# Patient Record
Sex: FEMALE | Race: WHITE | NOT HISPANIC OR LATINO | Employment: FULL TIME | URBAN - METROPOLITAN AREA
[De-identification: names, ages, dates, MRNs, and addresses within clinical notes are randomized per-mention and may not be internally consistent; named-entity substitution may affect disease eponyms.]

---

## 2018-06-26 ENCOUNTER — APPOINTMENT (OUTPATIENT)
Dept: OBGYN | Facility: CLINIC | Age: 28
End: 2018-06-26
Payer: COMMERCIAL

## 2018-06-26 ENCOUNTER — PRENATAL OFFICE VISIT (OUTPATIENT)
Dept: FAMILY MEDICINE | Facility: CLINIC | Age: 28
End: 2018-06-26
Payer: COMMERCIAL

## 2018-06-26 DIAGNOSIS — Z34.80 PRENATAL CARE, SUBSEQUENT PREGNANCY: Primary | ICD-10-CM

## 2018-06-26 PROCEDURE — 99207 ZZC NO CHARGE NURSE ONLY: CPT | Performed by: FAMILY MEDICINE

## 2018-06-26 RX ORDER — PYRIDOXINE HCL (VITAMIN B6) 25 MG
25 TABLET ORAL DAILY
COMMUNITY
End: 2018-09-05

## 2018-06-26 RX ORDER — ESCITALOPRAM OXALATE 10 MG/1
10 TABLET ORAL DAILY
COMMUNITY
End: 2018-10-03

## 2018-06-26 RX ORDER — PRENATAL VIT/IRON FUM/FOLIC AC 27MG-0.8MG
1 TABLET ORAL DAILY
COMMUNITY
Start: 2020-06-07 | End: 2022-05-10

## 2018-06-26 NOTE — MR AVS SNAPSHOT
"              After Visit Summary   6/26/2018    Lanie Martin    MRN: [id number]           Patient Information     Date Of Birth          1990        Visit Information        Provider Department      6/26/2018 5:36 PM Kd Wang MD [facility]        Today's Diagnoses     Prenatal care, subsequent pregnancy    -  1       Follow-ups after your visit        Your next 10 appointments already scheduled     Jul 03, 2018  1:00 PM CDT   New Prenatal with Kaycee Branham MD, [facility] 1   [facility] ([facility]    [address] [address] [phone]              Who to contact     If you have questions or need follow up information about today's clinic visit or your schedule please contact [facility] directly at [phone].  Normal or non-critical lab and imaging results will be communicated to you by MyChart, letter or phone within 4 business days after the clinic has received the results. If you do not hear from us within 7 days, please contact the clinic through MyChart or phone. If you have a critical or abnormal lab result, we will notify you by phone as soon as possible.  Submit refill requests through Recensus or call your pharmacy and they will forward the refill request to us. Please allow 3 business days for your refill to be completed.          Additional Information About Your Visit        MyChart Information     Recensus lets you send messages to your doctor, view your test results, renew your prescriptions, schedule appointments and more. To sign up, go to [email or web address] . Click on \"Log in\" on the left side of the screen, which will take you to the Welcome page. Then click on \"Sign up Now\" on the right side of the page.     You will be asked to enter the access code listed below, as well as some personal information. Please follow the directions to create your username and password.   "   Your access code is: [email or web address]  Expires: 2018  5:58 PM     Your access code will  in 90 days. If you need help or a new code, please call your [place] clinic or [phone].        Care EveryWhere ID     This is your Care EveryWhere ID. This could be used by other organizations to access your [place] medical records  [id number]        Your Vitals Were     Last Period                   2018            Blood Pressure from Last 3 Encounters:   11 108/67    Weight from Last 3 Encounters:   11 54.9 kg (121 lb)              Today, you had the following     No orders found for display       Primary Care Provider Fax #    Physician No Ref-Primary [phone]       No address on file        Equal Access to Services     KRISTOPHER CLAUDIO : Rocio dumonto Sogerda, waaxda luqadaha, qaybta kaalmada adeegyada, alex hunt . So [facility] [phone].    ATENCIÓN: Si habla español, tiene a finley disposición servicios gratuitos de asistencia lingüística. Llame al [phone].    We comply with applicable federal civil rights laws and [place] laws. We do not discriminate on the basis of race, color, national origin, age, disability, sex, sexual orientation, or gender identity.            Thank you!     Thank you for choosing [facility]  for your care. Our goal is always to provide you with excellent care. Hearing back from our patients is one way we can continue to improve our services. Please take a few minutes to complete the written survey that you may receive in the mail after your visit with us. Thank you!             Your Updated Medication List - Protect others around you: Learn how to safely use, store and throw away your medicines at www.disposemymeds.org.          This list is accurate as of 18  5:58 PM.  Always use your most recent med list.                   Brand Name Dispense Instructions for use Diagnosis    escitalopram 10 MG tablet     LEXAPRO     Take 10 mg by mouth daily        prenatal multivitamin plus iron 27-0.8 MG Tabs per tablet      Take 1 tablet by mouth daily        pyridOXINE 25 MG tablet    vitamin B-6     Take 25 mg by mouth daily        UNISOM 25 MG Tabs tablet   Generic drug:  doxylamine      Take 25 mg by mouth At Bedtime

## 2018-07-03 ENCOUNTER — PRENATAL OFFICE VISIT (OUTPATIENT)
Dept: OBGYN | Facility: CLINIC | Age: 28
End: 2018-07-03
Payer: COMMERCIAL

## 2018-07-03 VITALS
WEIGHT: 141.2 LBS | BODY MASS INDEX: 25.98 KG/M2 | HEIGHT: 62 IN | RESPIRATION RATE: 16 BRPM | SYSTOLIC BLOOD PRESSURE: 98 MMHG | TEMPERATURE: 98.4 F | DIASTOLIC BLOOD PRESSURE: 67 MMHG | HEART RATE: 78 BPM

## 2018-07-03 DIAGNOSIS — Z34.81 PRENATAL CARE, SUBSEQUENT PREGNANCY IN FIRST TRIMESTER: Primary | ICD-10-CM

## 2018-07-03 LAB
ABO + RH BLD: NORMAL
ABO + RH BLD: NORMAL
ALBUMIN UR-MCNC: NEGATIVE MG/DL
APPEARANCE UR: CLEAR
BILIRUB UR QL STRIP: NEGATIVE
BLD GP AB SCN SERPL QL: NORMAL
BLOOD BANK CMNT PATIENT-IMP: NORMAL
COLOR UR AUTO: YELLOW
ERYTHROCYTE [DISTWIDTH] IN BLOOD BY AUTOMATED COUNT: 11.9 % (ref 10–15)
GLUCOSE UR STRIP-MCNC: NEGATIVE MG/DL
HCT VFR BLD AUTO: 37.1 % (ref 35–47)
HGB BLD-MCNC: 12.7 G/DL (ref 11.7–15.7)
HGB UR QL STRIP: NEGATIVE
KETONES UR STRIP-MCNC: NEGATIVE MG/DL
LEUKOCYTE ESTERASE UR QL STRIP: NEGATIVE
MCH RBC QN AUTO: 31.8 PG (ref 26.5–33)
MCHC RBC AUTO-ENTMCNC: 34.2 G/DL (ref 31.5–36.5)
MCV RBC AUTO: 93 FL (ref 78–100)
NITRATE UR QL: NEGATIVE
PH UR STRIP: 6 PH (ref 5–7)
PLATELET # BLD AUTO: 273 10E9/L (ref 150–450)
RBC # BLD AUTO: 3.99 10E12/L (ref 3.8–5.2)
SOURCE: NORMAL
SP GR UR STRIP: 1.02 (ref 1–1.03)
SPECIMEN EXP DATE BLD: NORMAL
UROBILINOGEN UR STRIP-ACNC: 0.2 EU/DL (ref 0.2–1)
WBC # BLD AUTO: 9.4 10E9/L (ref 4–11)

## 2018-07-03 PROCEDURE — 36415 COLL VENOUS BLD VENIPUNCTURE: CPT | Performed by: OBSTETRICS & GYNECOLOGY

## 2018-07-03 PROCEDURE — 86850 RBC ANTIBODY SCREEN: CPT | Performed by: OBSTETRICS & GYNECOLOGY

## 2018-07-03 PROCEDURE — 86780 TREPONEMA PALLIDUM: CPT | Performed by: OBSTETRICS & GYNECOLOGY

## 2018-07-03 PROCEDURE — 85027 COMPLETE CBC AUTOMATED: CPT | Performed by: OBSTETRICS & GYNECOLOGY

## 2018-07-03 PROCEDURE — G0145 SCR C/V CYTO,THINLAYER,RESCR: HCPCS | Performed by: OBSTETRICS & GYNECOLOGY

## 2018-07-03 PROCEDURE — 87591 N.GONORRHOEAE DNA AMP PROB: CPT | Performed by: OBSTETRICS & GYNECOLOGY

## 2018-07-03 PROCEDURE — 87491 CHLMYD TRACH DNA AMP PROBE: CPT | Performed by: OBSTETRICS & GYNECOLOGY

## 2018-07-03 PROCEDURE — 99207 ZZC FIRST OB VISIT: CPT | Performed by: OBSTETRICS & GYNECOLOGY

## 2018-07-03 PROCEDURE — 87086 URINE CULTURE/COLONY COUNT: CPT | Performed by: OBSTETRICS & GYNECOLOGY

## 2018-07-03 PROCEDURE — 76817 TRANSVAGINAL US OBSTETRIC: CPT | Performed by: OBSTETRICS & GYNECOLOGY

## 2018-07-03 PROCEDURE — 87624 HPV HI-RISK TYP POOLED RSLT: CPT | Performed by: OBSTETRICS & GYNECOLOGY

## 2018-07-03 PROCEDURE — 81003 URINALYSIS AUTO W/O SCOPE: CPT | Performed by: OBSTETRICS & GYNECOLOGY

## 2018-07-03 PROCEDURE — 86900 BLOOD TYPING SEROLOGIC ABO: CPT | Performed by: OBSTETRICS & GYNECOLOGY

## 2018-07-03 PROCEDURE — 87389 HIV-1 AG W/HIV-1&-2 AB AG IA: CPT | Performed by: OBSTETRICS & GYNECOLOGY

## 2018-07-03 PROCEDURE — 86762 RUBELLA ANTIBODY: CPT | Performed by: OBSTETRICS & GYNECOLOGY

## 2018-07-03 PROCEDURE — 87340 HEPATITIS B SURFACE AG IA: CPT | Performed by: OBSTETRICS & GYNECOLOGY

## 2018-07-03 PROCEDURE — 86901 BLOOD TYPING SEROLOGIC RH(D): CPT | Performed by: OBSTETRICS & GYNECOLOGY

## 2018-07-03 NOTE — NURSING NOTE
"Initial BP 98/67 (BP Location: Right arm, Patient Position: Sitting, Cuff Size: Adult Regular)  Pulse 78  Temp 98.4  F (36.9  C) (Tympanic)  Resp 16  Ht 5' 2\" (1.575 m)  Wt 141 lb 3.2 oz (64 kg)  LMP 05/04/2018  Breastfeeding? No  BMI 25.83 kg/m2 Estimated body mass index is 25.83 kg/(m^2) as calculated from the following:    Height as of this encounter: 5' 2\" (1.575 m).    Weight as of this encounter: 141 lb 3.2 oz (64 kg). .    Samantha Moran, [facility]    "

## 2018-07-03 NOTE — MR AVS SNAPSHOT
"              After Visit Summary   7/3/2018    Lanie Martin    MRN: [id number]           Patient Information     Date Of Birth          1990        Visit Information        Provider Department      7/3/2018 1:00 PM Kaycee Branham MD; [facility] 1 [facility]        Today's Diagnoses     Prenatal care, subsequent pregnancy in first trimester    -  1       Follow-ups after your visit        Follow-up notes from your care team     Return in about 4 weeks (around 7/31/2018).      Your next 10 appointments already scheduled     Aug 03, 2018  4:15 PM CDT   ESTABLISHED PRENATAL with Rand Calle MD   [facility] ([facility]    [address] [address] [phone]              Who to contact     If you have questions or need follow up information about today's clinic visit or your schedule please contact [facility] directly at [phone].  Normal or non-critical lab and imaging results will be communicated to you by LineRate Systemshart, letter or phone within 4 business days after the clinic has received the results. If you do not hear from us within 7 days, please contact the clinic through CDNetworkst or phone. If you have a critical or abnormal lab result, we will notify you by phone as soon as possible.  Submit refill requests through "[employer] or call your pharmacy and they will forward the refill request to us. Please allow 3 business days for your refill to be completed.          Additional Information About Your Visit        LineRate Systemshart Information     "[employer] lets you send messages to your doctor, view your test results, renew your prescriptions, schedule appointments and more. To sign up, go to [email or web address] . Click on \"Log in\" on the left side of the screen, which will take you to the Welcome page. Then click on \"Sign up Now\" on the right side of the page.     You will be asked to enter the access code listed " "below, as well as some personal information. Please follow the directions to create your username and password.     Your access code is: [email or web address]  Expires: 2018  5:58 PM     Your access code will  in 90 days. If you need help or a new code, please call your [place] clinic or [phone].        Care EveryWhere ID     This is your Care EveryWhere ID. This could be used by other organizations to access your [place] medical records  [id number]        Your Vitals Were     Pulse Temperature Respirations Height Last Period Breastfeeding?    78 98.4  F (36.9  C) (Tympanic) 16 5' 2\" (1.575 m) 2018 No    BMI (Body Mass Index)                   25.83 kg/m2            Blood Pressure from Last 3 Encounters:   18 98/67   11 108/67    Weight from Last 3 Encounters:   18 141 lb 3.2 oz (64 kg)   11 121 lb (54.9 kg)              We Performed the Following     *UA reflex to Microscopic     ABO/Rh type and screen     CBC with platelets     Chlamydia trachomatis PCR     Hepatitis B surface antigen     HIV Antigen Antibody Combo     HPV High Risk Types DNA Cervical     Neisseria gonorrhoeae PCR     Pap imaged thin layer screen with HPV - recommended age 30 - 65 years (select HPV order below)     Rubella Antibody IgG Quantitative     Treponema Abs w Reflex to RPR and Titer     Urine Culture Aerobic Bacterial     US OB <14 Weeks w Transvaginal Single        Primary Care Provider Fax #    Physician No Ref-Primary [phone]       No address on file        Equal Access to Services     KRISTOPHER CLAUDIO : Hadii aad ku hadasho Soomaali, waaxda luqadaha, qaybta kaalmada adeegyada, alex hunt . So [facility] [phone].    ATENCIÓN: Si habla español, tiene a finley disposición servicios gratuitos de asistencia lingüística. Llame al [phone].    We comply with applicable federal civil rights laws and [place] laws. We do not discriminate on the basis of race, color, " national origin, age, disability, sex, sexual orientation, or gender identity.            Thank you!     Thank you for choosing [facility]  for your care. Our goal is always to provide you with excellent care. Hearing back from our patients is one way we can continue to improve our services. Please take a few minutes to complete the written survey that you may receive in the mail after your visit with us. Thank you!             Your Updated Medication List - Protect others around you: Learn how to safely use, store and throw away your medicines at www.disposemymeds.org.          This list is accurate as of 7/3/18  4:37 PM.  Always use your most recent med list.                   Brand Name Dispense Instructions for use Diagnosis    escitalopram 10 MG tablet    LEXAPRO     Take 10 mg by mouth daily        prenatal multivitamin plus iron 27-0.8 MG Tabs per tablet      Take 1 tablet by mouth daily        pyridOXINE 25 MG tablet    vitamin B-6     Take 25 mg by mouth daily        UNISOM 25 MG Tabs tablet   Generic drug:  doxylamine      Take 25 mg by mouth At Bedtime

## 2018-07-03 NOTE — PROGRESS NOTES
"Lanie is a 28 year old  at 8w4d by LMP here for new ob visit.     Very excited.  Had a son 12 years ago, who was adopted by her aunt and uncle, same FOB except at that time they had only been dating 3 months when they accidentally got pregnant.  This time it was a planned pregnancy.   Nausea   Depression on lexapro - well controlled       Obstetric History       T1      L1     SAB0   TAB0   Ectopic0   Multiple0   Live Births1       # Outcome Date GA Lbr Antoine/2nd Weight Sex Delivery Anes PTL Lv   2 Current            1 Term 10/28/06 40w0d  7 lb (3.175 kg) M    RADHA          ROS: Ten point review of systems was reviewed and negative except the above.    Past Medical History:   Diagnosis Date     Chickenpox      Depression      Past Surgical History:   Procedure Laterality Date     AS CREATE EARDRUM OPENING,GEN ANESTH       MOUTH SURGERY      wisdom teeth     Patient Active Problem List    Diagnosis Date Noted     Prenatal care, subsequent pregnancy 2018     Priority: Medium        Allergies   Allergen Reactions     Tylenol W/Codeine [Acetaminophen-Codeine]        Current Outpatient Prescriptions on File Prior to Visit:  doxylamine (UNISOM) 25 MG TABS tablet Take 25 mg by mouth At Bedtime   escitalopram (LEXAPRO) 10 MG tablet Take 10 mg by mouth daily   Prenatal Vit-Fe Fumarate-FA (PRENATAL MULTIVITAMIN PLUS IRON) 27-0.8 MG TABS per tablet Take 1 tablet by mouth daily   pyridOXINE (VITAMIN  B-6) 25 MG tablet Take 25 mg by mouth daily     No current facility-administered medications on file prior to visit.     Physical Exam:   BP 98/67 (BP Location: Right arm, Patient Position: Sitting, Cuff Size: Adult Regular)  Pulse 78  Temp 98.4  F (36.9  C) (Tympanic)  Resp 16  Ht 5' 2\" (1.575 m)  Wt 141 lb 3.2 oz (64 kg)  LMP 2018  Breastfeeding? No  BMI 25.83 kg/m2    Gen:  no acute distress, comfortable, smiling  HENT: No scleral injection or icterus  CV: Regular rate and rhythm, no " m/g/r  Resp: Normal work of breathing, no cough  GI: Abdomen soft, non-tender. No masses, organomegaly  Skin: No suspicious lesions or rashes  Psychiatric: mentation appears normal and affect bright    Cervix: Parous, closed, mobile, no discharge  Uterus: 9 weeks, Normal shape, position and consistency   Adnexa: Normal  Bony Pelvis: Adequate     Dating ultrasound 7/3/2018:  Impression:  Green intrauterine pregnancy at 9w0d with THERESA 19  Consistent with LMP dating at 8w4d with THERESA 2019   CRL 2.36cm   bpm    A/P 28 year old  at 8w4d dated by LMP c/w 8w4d US here for NOB visit.  - Discussed physician coverage, tertiary support, diet, exercise, weight gain, schedule of visits, routine and indicated ultrasounds, and childbirth education.   - Options for  testing for chromosomal and birth defects were discussed with the patient including nuchal lucency/blood marker testing in the first trimester and quad screening and/or Level 2 ultrasound in the second trimester. We discussed that these are screening tests and not diagnostic tests and that false positives and negatives are a distinct possibility. We discussed that follow up diagnostic testing would include chorionic villus sampling or amniocentesis depending on gestational age.  - NOB labs ordered  - recommend PNV    RTC 4 weeks    Kaycee Branham MD, MPH  [facility]

## 2018-07-03 NOTE — LETTER
July 11, 2018    Lanie Martin  [address] [address]    Dear Lanie,  We are happy to inform you that your PAP smear result from 7/3/18 is normal.  We are now able to do a follow up test on PAP smears. The DNA test is for HPV (Human Papilloma Virus). Cervical cancer is closely linked with certain types of HPV. Your results showed no evidence of high risk HPV.  Therefore we recommend you return in 3 years for your next pap smear.  You will still need to return to the clinic every year for an annual exam and other preventive tests.  Please contact the clinic at [phone] with any questions.  Sincerely,    Kaycee Branham MD/eve

## 2018-07-04 LAB
BACTERIA SPEC CULT: NO GROWTH
Lab: NORMAL
SPECIMEN SOURCE: NORMAL

## 2018-07-05 LAB
C TRACH DNA SPEC QL NAA+PROBE: NEGATIVE
HBV SURFACE AG SERPL QL IA: NONREACTIVE
HIV 1+2 AB+HIV1 P24 AG SERPL QL IA: NONREACTIVE
N GONORRHOEA DNA SPEC QL NAA+PROBE: NEGATIVE
RUBV IGG SERPL IA-ACNC: 17 IU/ML
SPECIMEN SOURCE: NORMAL
SPECIMEN SOURCE: NORMAL
T PALLIDUM AB SER QL: NONREACTIVE

## 2018-07-09 LAB
COPATH REPORT: NORMAL
PAP: NORMAL

## 2018-07-10 LAB
FINAL DIAGNOSIS: NORMAL
HPV HR 12 DNA CVX QL NAA+PROBE: NEGATIVE
HPV16 DNA SPEC QL NAA+PROBE: NEGATIVE
HPV18 DNA SPEC QL NAA+PROBE: NEGATIVE
SPECIMEN DESCRIPTION: NORMAL
SPECIMEN SOURCE CVX/VAG CYTO: NORMAL

## 2018-08-03 ENCOUNTER — PRENATAL OFFICE VISIT (OUTPATIENT)
Dept: OBGYN | Facility: CLINIC | Age: 28
End: 2018-08-03
Payer: COMMERCIAL

## 2018-08-03 VITALS
TEMPERATURE: 98.5 F | HEART RATE: 77 BPM | WEIGHT: 138.6 LBS | BODY MASS INDEX: 25.51 KG/M2 | DIASTOLIC BLOOD PRESSURE: 73 MMHG | HEIGHT: 62 IN | RESPIRATION RATE: 16 BRPM | SYSTOLIC BLOOD PRESSURE: 110 MMHG

## 2018-08-03 DIAGNOSIS — Z34.81 PRENATAL CARE, SUBSEQUENT PREGNANCY IN FIRST TRIMESTER: Primary | ICD-10-CM

## 2018-08-03 PROCEDURE — 99207 ZZC PRENATAL VISIT: CPT | Performed by: OBSTETRICS & GYNECOLOGY

## 2018-08-03 NOTE — NURSING NOTE
"Initial /73 (BP Location: Right arm, Patient Position: Sitting, Cuff Size: Adult Regular)  Pulse 77  Temp 98.5  F (36.9  C) (Tympanic)  Resp 16  Ht 5' 2\" (1.575 m)  Wt 138 lb 9.6 oz (62.9 kg)  LMP 05/04/2018  Breastfeeding? No  BMI 25.35 kg/m2 Estimated body mass index is 25.35 kg/(m^2) as calculated from the following:    Height as of this encounter: 5' 2\" (1.575 m).    Weight as of this encounter: 138 lb 9.6 oz (62.9 kg). .    Samantha Moran, [facility]    "

## 2018-08-03 NOTE — MR AVS SNAPSHOT
"              After Visit Summary   8/3/2018    Lanie Martin    MRN: [id number]           Patient Information     Date Of Birth          1990        Visit Information        Provider Department      8/3/2018 4:15 PM Rand Calle MD [facility]        Today's Diagnoses     Prenatal care, subsequent pregnancy in first trimester    -  1       Follow-ups after your visit        Who to contact     If you have questions or need follow up information about today's clinic visit or your schedule please contact [facility] directly at [phone].  Normal or non-critical lab and imaging results will be communicated to you by CeQurhart, letter or phone within 4 business days after the clinic has received the results. If you do not hear from us within 7 days, please contact the clinic through CeQurhart or phone. If you have a critical or abnormal lab result, we will notify you by phone as soon as possible.  Submit refill requests through Tamecco or call your pharmacy and they will forward the refill request to us. Please allow 3 business days for your refill to be completed.          Additional Information About Your Visit        MyChart Information     Tamecco lets you send messages to your doctor, view your test results, renew your prescriptions, schedule appointments and more. To sign up, go to [email or web address] . Click on \"Log in\" on the left side of the screen, which will take you to the Welcome page. Then click on \"Sign up Now\" on the right side of the page.     You will be asked to enter the access code listed below, as well as some personal information. Please follow the directions to create your username and password.     Your access code is: [email or web address]  Expires: 2018  5:58 PM     Your access code will  in 90 days. If you need help or a new code, please call your [facility] or [phone].        Care EveryWhere ID     This is your Care EveryWhere " "ID. This could be used by other organizations to access your [facility] medical records  [id number]        Your Vitals Were     Pulse Temperature Respirations Height Last Period Breastfeeding?    77 98.5  F (36.9  C) (Tympanic) 16 5' 2\" (1.575 m) 05/04/2018 No    BMI (Body Mass Index)                   25.35 kg/m2            Blood Pressure from Last 3 Encounters:   08/03/18 110/73   07/03/18 98/67   05/12/11 108/67    Weight from Last 3 Encounters:   08/03/18 138 lb 9.6 oz (62.9 kg)   07/03/18 141 lb 3.2 oz (64 kg)   05/12/11 121 lb (54.9 kg)              Today, you had the following     No orders found for display       Primary Care Provider Fax #    Physician No Ref-Primary [phone]       No address on file        Equal Access to Services     ED CLAUDIO : Hadkhushboo Nichols, [place], alfredo underwoodalmaasif feldman, alex hunt . So [facility] [phone].    ATENCIÓN: Si habla español, tiene a finley disposición servicios gratuitos de asistencia lingüística. Llame al [phone].    We comply with applicable federal civil rights laws and [place] laws. We do not discriminate on the basis of race, color, national origin, age, disability, sex, sexual orientation, or gender identity.            Thank you!     Thank you for choosing [facility]  for your care. Our goal is always to provide you with excellent care. Hearing back from our patients is one way we can continue to improve our services. Please take a few minutes to complete the written survey that you may receive in the mail after your visit with us. Thank you!             Your Updated Medication List - Protect others around you: Learn how to safely use, store and throw away your medicines at www.disposemymeds.org.          This list is accurate as of 8/3/18  4:23 PM.  Always use your most recent med list.                   Brand Name Dispense Instructions for use Diagnosis    escitalopram 10 MG tablet    " LEXAPRO     Take 10 mg by mouth daily        prenatal multivitamin plus iron 27-0.8 MG Tabs per tablet      Take 1 tablet by mouth daily        pyridOXINE 25 MG tablet    vitamin B-6     Take 25 mg by mouth daily        UNISOM 25 MG Tabs tablet   Generic drug:  doxylamine      Take 25 mg by mouth At Bedtime

## 2018-08-03 NOTE — PROGRESS NOTES
"CC: Here for routine prenatal visit @ 13w0d   HPI:  Reviewed lab results; Options for  testing for birth defects were discussed with the patient, generally including CVS testing, Quad screen serum testing, nuchal lucency/blood marker testing, genetic amniocentesis, Verifi testing  and/or level 2 ultrasound.  Pt to consider options    PE: /73 (BP Location: Right arm, Patient Position: Sitting, Cuff Size: Adult Regular)  Pulse 77  Temp 98.5  F (36.9  C) (Tympanic)  Resp 16  Ht 5' 2\" (1.575 m)  Wt 138 lb 9.6 oz (62.9 kg)  LMP 2018  Breastfeeding? No  BMI 25.35 kg/m2   See OB flowsheet      A:  1. Prenatal care, subsequent pregnancy in first trimester        Routine prenatal care  RTC 4 weeks.      Rand Calle M.D.     "

## 2018-09-05 ENCOUNTER — PRENATAL OFFICE VISIT (OUTPATIENT)
Dept: OBGYN | Facility: CLINIC | Age: 28
End: 2018-09-05
Payer: COMMERCIAL

## 2018-09-05 VITALS
WEIGHT: 139.4 LBS | SYSTOLIC BLOOD PRESSURE: 108 MMHG | HEART RATE: 83 BPM | DIASTOLIC BLOOD PRESSURE: 71 MMHG | BODY MASS INDEX: 25.65 KG/M2 | TEMPERATURE: 98 F | RESPIRATION RATE: 16 BRPM | HEIGHT: 62 IN

## 2018-09-05 DIAGNOSIS — Z34.82 PRENATAL CARE, SUBSEQUENT PREGNANCY IN SECOND TRIMESTER: Primary | ICD-10-CM

## 2018-09-05 DIAGNOSIS — Z34.80 PRENATAL CARE, SUBSEQUENT PREGNANCY, UNSPECIFIED TRIMESTER: ICD-10-CM

## 2018-09-05 PROCEDURE — 99207 ZZC PRENATAL VISIT: CPT | Performed by: OBSTETRICS & GYNECOLOGY

## 2018-09-05 NOTE — MR AVS SNAPSHOT
"              After Visit Summary   9/5/2018    Lanie Martin    MRN: [id number]           Patient Information     Date Of Birth          1990        Visit Information        Provider Department      9/5/2018 3:30 PM Jesus Kellogg MD [facility]        Today's Diagnoses     Prenatal care, subsequent pregnancy in second trimester    -  1    Prenatal care, subsequent pregnancy, unspecified trimester           Follow-ups after your visit        Future tests that were ordered for you today     Open Future Orders        Priority Expected Expires Ordered    US OB > 14 Weeks Complete Single Routine  9/5/2019 9/5/2018            Who to contact     If you have questions or need follow up information about today's clinic visit or your schedule please contact [facility] directly at [phone].  Normal or non-critical lab and imaging results will be communicated to you by MyChart, letter or phone within 4 business days after the clinic has received the results. If you do not hear from us within 7 days, please contact the clinic through MyChart or phone. If you have a critical or abnormal lab result, we will notify you by phone as soon as possible.  Submit refill requests through Zaiseoul or call your pharmacy and they will forward the refill request to us. Please allow 3 business days for your refill to be completed.          Additional Information About Your Visit        BluelightApphart Information     Zaiseoul lets you send messages to your doctor, view your test results, renew your prescriptions, schedule appointments and more. To sign up, go to [email or web address] . Click on \"Log in\" on the left side of the screen, which will take you to the Welcome page. Then click on \"Sign up Now\" on the right side of the page.     You will be asked to enter the access code listed below, as well as some personal information. Please follow the directions to create your username and password.   " "  Your access code is: [email or web address]  Expires: 2018  3:18 PM     Your access code will  in 90 days. If you need help or a new code, please call your [facility] clinic or [phone].        Care EveryWhere ID     This is your Care EveryWhere ID. This could be used by other organizations to access your [place] medical records  [id number]        Your Vitals Were     Pulse Temperature Respirations Height Last Period BMI (Body Mass Index)    83 98  F (36.7  C) 16 5' 2\" (1.575 m) 2018 25.5 kg/m2       Blood Pressure from Last 3 Encounters:   18 108/71   18 110/73   18 98/67    Weight from Last 3 Encounters:   18 139 lb 6.4 oz (63.2 kg)   18 138 lb 9.6 oz (62.9 kg)   18 141 lb 3.2 oz (64 kg)                 Today's Medication Changes          These changes are accurate as of 18  3:54 PM.  If you have any questions, ask your nurse or doctor.               Stop taking these medicines if you haven't already. Please contact your care team if you have questions.     pyridOXINE 25 MG tablet   Commonly known as:  vitamin B-6   Stopped by:  Jesus Kellogg MD           UNISOM 25 MG Tabs tablet   Generic drug:  doxylamine   Stopped by:  Jesus Kellogg MD                    Primary Care Provider Fax #    Physician No Ref-Primary [phone]       No address on file        Equal Access to Services     [facility]: Hadkhushboo rodriguez Sogerda, waaxda luqadaha, qaybta kaalmada adeserena, alex damon. So [facility] [phone].    ATENCIÓN: Si habla español, tiene a finley disposición servicios gratuitos de asistencia lingüística. Llame al [phone].    We comply with applicable federal civil rights laws and [place] laws. We do not discriminate on the basis of race, color, national origin, age, disability, sex, sexual orientation, or gender identity.            Thank you!     Thank you for choosing [facility]  for your " care. Our goal is always to provide you with excellent care. Hearing back from our patients is one way we can continue to improve our services. Please take a few minutes to complete the written survey that you may receive in the mail after your visit with us. Thank you!             Your Updated Medication List - Protect others around you: Learn how to safely use, store and throw away your medicines at www.disposemymeds.org.          This list is accurate as of 9/5/18  3:54 PM.  Always use your most recent med list.                   Brand Name Dispense Instructions for use Diagnosis    escitalopram 10 MG tablet    LEXAPRO     Take 10 mg by mouth daily        prenatal multivitamin plus iron 27-0.8 MG Tabs per tablet      Take 1 tablet by mouth daily

## 2018-09-05 NOTE — PROGRESS NOTES
Concerns today:   No nausea/vomiting. No heartburn.  No vaginal bleeding, no contractions/severe cramping, no leakage of fluid.  No vaginal discharge. No dysuria  No headache, vision changes, lower extremity swelling, upper abdominal pain, chest pain, shortness of breath.   Discussed  screening.  She declines testing at this time.  Checklist updated, see prenatal flowsheet for details    Jesus Kellogg MD

## 2018-09-19 ENCOUNTER — MYC MEDICAL ADVICE (OUTPATIENT)
Dept: OBGYN | Facility: CLINIC | Age: 28
End: 2018-09-19

## 2018-09-19 DIAGNOSIS — F32.89 OTHER DEPRESSION: Primary | ICD-10-CM

## 2018-09-19 RX ORDER — ESCITALOPRAM OXALATE 10 MG/1
15 TABLET ORAL DAILY
Qty: 45 TABLET | Refills: 1 | Status: SHIPPED | OUTPATIENT
Start: 2018-09-19 | End: 2018-12-11

## 2018-09-19 NOTE — TELEPHONE ENCOUNTER
Patient currently taking Lexapro 10 mg daily.  Patient would like to increase to 15 mg daily.   Patient in need of new prescription.    Patient currently 19W5D pregnant.    PHQ-9 and TRINITY-7 screening sent to patient through [employer]. Await results.    Future appointment with Dr. Kellogg 10/3/18    Please review and advise.  Thank you.    Shannon Blackman   Ob/Gyn Clinic  RN

## 2018-09-20 ENCOUNTER — HOSPITAL ENCOUNTER (OUTPATIENT)
Dept: ULTRASOUND IMAGING | Facility: CLINIC | Age: 28
Discharge: HOME | End: 2018-09-20
Attending: OBSTETRICS & GYNECOLOGY | Admitting: OBSTETRICS & GYNECOLOGY
Payer: COMMERCIAL

## 2018-09-20 DIAGNOSIS — Z34.82 PRENATAL CARE, SUBSEQUENT PREGNANCY IN SECOND TRIMESTER: ICD-10-CM

## 2018-09-20 PROCEDURE — 76805 OB US >/= 14 WKS SNGL FETUS: CPT

## 2018-10-03 ENCOUNTER — PRENATAL OFFICE VISIT (OUTPATIENT)
Dept: OBGYN | Facility: CLINIC | Age: 28
End: 2018-10-03
Payer: COMMERCIAL

## 2018-10-03 VITALS
HEIGHT: 62 IN | BODY MASS INDEX: 25.76 KG/M2 | WEIGHT: 140 LBS | TEMPERATURE: 97.6 F | HEART RATE: 83 BPM | SYSTOLIC BLOOD PRESSURE: 113 MMHG | RESPIRATION RATE: 16 BRPM | DIASTOLIC BLOOD PRESSURE: 52 MMHG

## 2018-10-03 DIAGNOSIS — Z34.80 PRENATAL CARE, SUBSEQUENT PREGNANCY, UNSPECIFIED TRIMESTER: ICD-10-CM

## 2018-10-03 DIAGNOSIS — Z34.82 PRENATAL CARE, SUBSEQUENT PREGNANCY IN SECOND TRIMESTER: Primary | ICD-10-CM

## 2018-10-03 PROCEDURE — 99207 ZZC PRENATAL VISIT: CPT | Performed by: OBSTETRICS & GYNECOLOGY

## 2018-10-03 NOTE — PROGRESS NOTES
Concerns today: reviewed the ULTRASOUND films  Doing well.  No concerns today.  No nausea/vomiting. No heartburn.  No vaginal bleeding, no contractions/severe cramping, no leakage of fluid.  No vaginal discharge. No dysuria  No headache, vision changes, lower extremity swelling, upper abdominal pain, chest pain, shortness of breath.   Reportable signs and symptoms discussed.      Jesus Kellogg MD

## 2018-10-03 NOTE — MR AVS SNAPSHOT
After Visit Summary   10/3/2018    Lanie Martin    MRN: [id number]           Patient Information     Date Of Birth          1990        Visit Information        Provider Department      10/3/2018 3:45 PM Jesus Kellogg MD [facility]        Today's Diagnoses     Prenatal care, subsequent pregnancy in second trimester    -  1    Prenatal care, subsequent pregnancy, unspecified trimester           Follow-ups after your visit        Follow-up notes from your care team     Return in about 4 weeks (around 10/31/2018).      Future tests that were ordered for you today     Open Future Orders        Priority Expected Expires Ordered    Glucose tolerance gest screen 1 hour Routine  11/7/2018 10/3/2018    CBC with platelets Routine  11/7/2018 10/3/2018    Treponema Abs w Reflex to RPR and Titer Routine  11/7/2018 10/3/2018            Who to contact     If you have questions or need follow up information about today's clinic visit or your schedule please contact [facility] directly at [phone].  Normal or non-critical lab and imaging results will be communicated to you by Frogdicehart, letter or phone within 4 business days after the clinic has received the results. If you do not hear from us within 7 days, please contact the clinic through Pagert or phone. If you have a critical or abnormal lab result, we will notify you by phone as soon as possible.  Submit refill requests through Voolgo or call your pharmacy and they will forward the refill request to us. Please allow 3 business days for your refill to be completed.          Additional Information About Your Visit        MyChart Information     V[employer] gives you secure access to your electronic health record. If you see a primary care provider, you can also send messages to your care team and make appointments. If you have questions, please call your primary care clinic.  If you do not have a primary care provider,  "please call [phone] and they will assist you.        Care EveryWhere ID     This is your Care EveryWhere ID. This could be used by other organizations to access your [facility] medical records  [id number]        Your Vitals Were     Pulse Temperature Respirations Height Last Period BMI (Body Mass Index)    83 97.6  F (36.4  C) 16 5' 2\" (1.575 m) 05/04/2018 25.61 kg/m2       Blood Pressure from Last 3 Encounters:   10/03/18 113/52   09/05/18 108/71   08/03/18 110/73    Weight from Last 3 Encounters:   10/03/18 140 lb (63.5 kg)   09/05/18 139 lb 6.4 oz (63.2 kg)   08/03/18 138 lb 9.6 oz (62.9 kg)                 Today's Medication Changes          These changes are accurate as of 10/3/18  4:12 PM.  If you have any questions, ask your nurse or doctor.               These medicines have changed or have updated prescriptions.        Dose/Directions    escitalopram 10 MG tablet   Commonly known as:  LEXAPRO   This may have changed:  Another medication with the same name was removed. Continue taking this medication, and follow the directions you see here.   Used for:  Other depression   Changed by:  Jesus Kellogg MD        Dose:  15 mg   Take 1.5 tablets (15 mg) by mouth daily   Quantity:  45 tablet   Refills:  1                Primary Care Provider Fax #    Physician No Ref-Primary [phone]       No address on file        Equal Access to Services     KRISTOPHER CLAUDIO AH: Hadii donna dumonto Sogerda, waaxda luqadaha, qaybta kaalmada gaston, alex damon. So [facility] [place] [phone].    ATENCIÓN: Si habla español, tiene a finley disposición servicios gratuitos de asistencia lingüística. Llame al [phone].    We comply with applicable federal civil rights laws and [place] laws. We do not discriminate on the basis of race, color, national origin, age, disability, sex, sexual orientation, or gender identity.            Thank you!     Thank you for choosing [facility]  for " your care. Our goal is always to provide you with excellent care. Hearing back from our patients is one way we can continue to improve our services. Please take a few minutes to complete the written survey that you may receive in the mail after your visit with us. Thank you!             Your Updated Medication List - Protect others around you: Learn how to safely use, store and throw away your medicines at www.disposemymeds.org.          This list is accurate as of 10/3/18  4:12 PM.  Always use your most recent med list.                   Brand Name Dispense Instructions for use Diagnosis    escitalopram 10 MG tablet    LEXAPRO    45 tablet    Take 1.5 tablets (15 mg) by mouth daily    Other depression       prenatal multivitamin plus iron 27-0.8 MG Tabs per tablet      Take 1 tablet by mouth daily

## 2018-10-23 DIAGNOSIS — Z34.82 PRENATAL CARE, SUBSEQUENT PREGNANCY IN SECOND TRIMESTER: ICD-10-CM

## 2018-10-23 LAB
ERYTHROCYTE [DISTWIDTH] IN BLOOD BY AUTOMATED COUNT: 13 % (ref 10–15)
GLUCOSE 1H P 50 G GLC PO SERPL-MCNC: 121 MG/DL (ref 60–129)
HCT VFR BLD AUTO: 36.4 % (ref 35–47)
HGB BLD-MCNC: 12.1 G/DL (ref 11.7–15.7)
MCH RBC QN AUTO: 31.3 PG (ref 26.5–33)
MCHC RBC AUTO-ENTMCNC: 33.2 G/DL (ref 31.5–36.5)
MCV RBC AUTO: 94 FL (ref 78–100)
PLATELET # BLD AUTO: 285 10E9/L (ref 150–450)
RBC # BLD AUTO: 3.87 10E12/L (ref 3.8–5.2)
WBC # BLD AUTO: 8.6 10E9/L (ref 4–11)

## 2018-10-23 PROCEDURE — 36415 COLL VENOUS BLD VENIPUNCTURE: CPT | Performed by: OBSTETRICS & GYNECOLOGY

## 2018-10-23 PROCEDURE — 85027 COMPLETE CBC AUTOMATED: CPT | Performed by: OBSTETRICS & GYNECOLOGY

## 2018-10-23 PROCEDURE — 86780 TREPONEMA PALLIDUM: CPT | Performed by: OBSTETRICS & GYNECOLOGY

## 2018-10-23 PROCEDURE — 82950 GLUCOSE TEST: CPT | Performed by: OBSTETRICS & GYNECOLOGY

## 2018-10-24 LAB — T PALLIDUM AB SER QL: NONREACTIVE

## 2018-10-31 ENCOUNTER — PRENATAL OFFICE VISIT (OUTPATIENT)
Dept: OBGYN | Facility: CLINIC | Age: 28
End: 2018-10-31
Payer: COMMERCIAL

## 2018-10-31 VITALS
HEART RATE: 91 BPM | RESPIRATION RATE: 16 BRPM | BODY MASS INDEX: 26.39 KG/M2 | SYSTOLIC BLOOD PRESSURE: 98 MMHG | DIASTOLIC BLOOD PRESSURE: 61 MMHG | TEMPERATURE: 98.1 F | HEIGHT: 62 IN | WEIGHT: 143.4 LBS

## 2018-10-31 DIAGNOSIS — Z34.80 PRENATAL CARE, SUBSEQUENT PREGNANCY, UNSPECIFIED TRIMESTER: Primary | ICD-10-CM

## 2018-10-31 PROCEDURE — 99207 ZZC PRENATAL VISIT: CPT | Performed by: OBSTETRICS & GYNECOLOGY

## 2018-10-31 NOTE — MR AVS SNAPSHOT
After Visit Summary   10/31/2018    Lanie Martin    MRN: [id number]           Patient Information     Date Of Birth          1990        Visit Information        Provider Department      10/31/2018 4:30 PM Jesus Kellogg MD [facility]        Today's Diagnoses     Prenatal care, subsequent pregnancy, unspecified trimester    -  1       Follow-ups after your visit        Follow-up notes from your care team     Return in about 4 weeks (around 11/28/2018).      Your next 10 appointments already scheduled     Nov 29, 2018  4:00 PM CST   [facility] with [facility]    [address] [address] [phone]              Who to contact     If you have questions or need follow up information about today's clinic visit or your schedule please contact [facility] directly at [phone].  Normal or non-critical lab and imaging results will be communicated to you by MyChart, letter or phone within 4 business days after the clinic has received the results. If you do not hear from us within 7 days, please contact the clinic through [employer] or phone. If you have a critical or abnormal lab result, we will notify you by phone as soon as possible.  Submit refill requests through Nexess or call your pharmacy and they will forward the refill request to us. Please allow 3 business days for your refill to be completed.          Additional Information About Your Visit        MyChart Information     Nexess gives you secure access to your electronic health record. If you see a primary care provider, you can also send messages to your care team and make appointments. If you have questions, please call your primary care clinic.  If you do not have a primary care provider, please call [phone] and they will assist you.        Care EveryWhere ID     This is your Care EveryWhere  "ID. This could be used by other organizations to access your [facility] medical records  [id number]        Your Vitals Were     Pulse Temperature Respirations Height Last Period BMI (Body Mass Index)    91 98.1  F (36.7  C) 16 5' 2\" (1.575 m) 05/04/2018 26.23 kg/m2       Blood Pressure from Last 3 Encounters:   10/31/18 98/61   10/03/18 113/52   09/05/18 108/71    Weight from Last 3 Encounters:   10/31/18 143 lb 6.4 oz (65 kg)   10/03/18 140 lb (63.5 kg)   09/05/18 139 lb 6.4 oz (63.2 kg)              Today, you had the following     No orders found for display       Primary Care Provider Fax #    Physician No Ref-Primary [phone]       No address on file        Equal Access to Services     KRISTOPHER CLAUDIO : Rocio Nichols, [place], alfredo feldman, alex hunt . So [facility] [phone].    ATENCIÓN: Si habla español, tiene a finley disposición servicios gratuitos de asistencia lingüística. Ellie al [phone].    We comply with applicable federal civil rights laws and [place] laws. We do not discriminate on the basis of race, color, national origin, age, disability, sex, sexual orientation, or gender identity.            Thank you!     Thank you for choosing [facility]  for your care. Our goal is always to provide you with excellent care. Hearing back from our patients is one way we can continue to improve our services. Please take a few minutes to complete the written survey that you may receive in the mail after your visit with us. Thank you!             Your Updated Medication List - Protect others around you: Learn how to safely use, store and throw away your medicines at www.disposemymeds.org.          This list is accurate as of 10/31/18 11:59 PM.  Always use your most recent med list.                   Brand Name Dispense Instructions for use Diagnosis    escitalopram 10 MG tablet    LEXAPRO    45 tablet    Take 1.5 tablets (15 mg) by " mouth daily    Other depression       prenatal multivitamin plus iron 27-0.8 MG Tabs per tablet      Take 1 tablet by mouth daily

## 2018-10-31 NOTE — PROGRESS NOTES
Concerns:   Doing well.  No concerns today.  No vaginal bleeding, no contractions, no leakage of fluid  No nausea/vomiting. No heartburn  No vaginal discharge. No dysuria.   No headache, vision changes, lower extremity swelling, upper abdominal pain, chest pain, shortness of breath  Tdap planned next visit  Discussed PTL, PROM, and when to call or come in.  Normal anatomy ultrasound.  RTC 4 weeks.  GTT and labs today       Jesus Kellogg MD

## 2018-11-29 ENCOUNTER — PRENATAL OFFICE VISIT (OUTPATIENT)
Dept: OBGYN | Facility: CLINIC | Age: 28
End: 2018-11-29
Payer: COMMERCIAL

## 2018-11-29 VITALS
SYSTOLIC BLOOD PRESSURE: 102 MMHG | BODY MASS INDEX: 27.94 KG/M2 | TEMPERATURE: 98 F | HEART RATE: 88 BPM | HEIGHT: 62 IN | WEIGHT: 151.8 LBS | DIASTOLIC BLOOD PRESSURE: 66 MMHG | RESPIRATION RATE: 16 BRPM

## 2018-11-29 DIAGNOSIS — Z34.83 PRENATAL CARE, SUBSEQUENT PREGNANCY IN THIRD TRIMESTER: ICD-10-CM

## 2018-11-29 DIAGNOSIS — Z23 NEED FOR PROPHYLACTIC VACCINATION AND INOCULATION AGAINST INFLUENZA: ICD-10-CM

## 2018-11-29 DIAGNOSIS — Z23 NEED FOR TDAP VACCINATION: Primary | ICD-10-CM

## 2018-11-29 PROCEDURE — 99207 ZZC PRENATAL VISIT: CPT | Performed by: OBSTETRICS & GYNECOLOGY

## 2018-11-29 PROCEDURE — 90471 IMMUNIZATION ADMIN: CPT | Performed by: OBSTETRICS & GYNECOLOGY

## 2018-11-29 PROCEDURE — 90686 IIV4 VACC NO PRSV 0.5 ML IM: CPT | Performed by: OBSTETRICS & GYNECOLOGY

## 2018-11-29 PROCEDURE — 90715 TDAP VACCINE 7 YRS/> IM: CPT | Performed by: OBSTETRICS & GYNECOLOGY

## 2018-11-29 PROCEDURE — 90472 IMMUNIZATION ADMIN EACH ADD: CPT | Performed by: OBSTETRICS & GYNECOLOGY

## 2018-11-29 NOTE — NURSING NOTE
"Initial /66 (BP Location: Right arm, Patient Position: Chair, Cuff Size: Adult Regular)  Pulse 88  Temp 98  F (36.7  C) (Tympanic)  Resp 16  Ht 5' 2\" (1.575 m)  Wt 151 lb 12.8 oz (68.9 kg)  LMP 05/04/2018  BMI 27.76 kg/m2 Estimated body mass index is 27.76 kg/(m^2) as calculated from the following:    Height as of this encounter: 5' 2\" (1.575 m).    Weight as of this encounter: 151 lb 12.8 oz (68.9 kg). .      "

## 2018-11-29 NOTE — PROGRESS NOTES

## 2018-11-29 NOTE — NURSING NOTE
Screening Questionnaire for Adult Immunization    Are you sick today?   No   Do you have allergies to medications, food, a vaccine component or latex?   No   Have you ever had a serious reaction after receiving a vaccination?   No   Do you have a long-term health problem with heart disease, lung disease, asthma, kidney disease, metabolic disease (e.g. diabetes), anemia, or other blood disorder?   No   Do you have cancer, leukemia, HIV/AIDS, or any other immune system problem?   No   In the past 3 months, have you taken medications that affect  your immune system, such as prednisone, other steroids, or anticancer drugs; drugs for the treatment of rheumatoid arthritis, Crohn s disease, or psoriasis; or have you had radiation treatments?   No   Have you had a seizure, or a brain or other nervous system problem?   No   During the past year, have you received a transfusion of blood or blood     products, or been given immune (gamma) globulin or antiviral drug?   No   For women: Are you pregnant or is there a chance you could become        pregnant during the next month?   Yes   Have you received any vaccinations in the past 4 weeks?   No       Per orders of Dr. Cool, injection of Tdap given by Tova Henderson. Patient instructed to remain in clinic for 15 minutes afterwards, and to report any adverse reaction to me immediately.       Screening performed by Tova Henderson on 11/29/2018 at 4:31 PM.

## 2018-11-29 NOTE — MR AVS SNAPSHOT
After Visit Summary   11/29/2018    Lanie Martin    MRN: [id number]           Patient Information     Date Of Birth          1990        Visit Information        Provider Department      11/29/2018 4:00 PM Carol Cool MD [facility]        Today's Diagnoses     Need for Tdap vaccination    -  1    Prenatal care, subsequent pregnancy in third trimester        Need for prophylactic vaccination and inoculation against influenza           Follow-ups after your visit        Your next 10 appointments already scheduled     Dec 11, 2018  4:00 PM CST   ESTABLISHED PRENATAL with Annabel Clarke MD   [facility]    [address] [address]   [phone]            Dec 27, 2018  3:30 PM CST   ESTABLISHED PRENATAL with Jesus Kellogg MD   [facility]    [address] [address]   [phone]              Who to contact     If you have questions or need follow up information about today's clinic visit or your schedule please contact [facility] directly at [phone].  Normal or non-critical lab and imaging results will be communicated to you by MyChart, letter or phone within 4 business days after the clinic has received the results. If you do not hear from us within 7 days, please contact the clinic through [employer] or phone. If you have a critical or abnormal lab result, we will notify you by phone as soon as possible.  Submit refill requests through [employer] or call your pharmacy and they will forward the refill request to us. Please allow 3 business days for your refill to be completed.          Additional Information About Your Visit        MyChart Information     [employer] gives you secure access to your electronic health record. If you see a primary care provider, you can also send messages to your care team and make  "appointments. If you have questions, please call your primary care clinic.  If you do not have a primary care provider, please call [phone] and they will assist you.        Care EveryWhere ID     This is your Care EveryWhere ID. This could be used by other organizations to access your [facility] medical records  [id number]        Your Vitals Were     Pulse Temperature Respirations Height Last Period BMI (Body Mass Index)    88 98  F (36.7  C) (Tympanic) 16 5' 2\" (1.575 m) 05/04/2018 27.76 kg/m2       Blood Pressure from Last 3 Encounters:   11/29/18 102/66   10/31/18 98/61   10/03/18 113/52    Weight from Last 3 Encounters:   11/29/18 151 lb 12.8 oz (68.9 kg)   10/31/18 143 lb 6.4 oz (65 kg)   10/03/18 140 lb (63.5 kg)              We Performed the Following     ADMIN 1st VACCINE     FLU VACCINE, SPLIT VIRUS, IM (QUADRIVALENT) [08470]- >3 YRS     TDAP VACCINE (ADACEL)     Vaccine Administration, Each Additional [02127]        Primary Care Provider Fax #    Physician No Ref-Primary [phone]       No address on file        Equal Access to Services     KRISTOPHER CLAUDIO : Hadii donna Nichols, waaxda shanique, qaybta kaalmada adeserena, alex damon. So [facility] [phone].    ATENCIÓN: Si habla español, tiene a finley disposición servicios gratuitos de asistencia lingüística. Llame al [phone].    We comply with applicable federal civil rights laws and [place] laws. We do not discriminate on the basis of race, color, national origin, age, disability, sex, sexual orientation, or gender identity.            Thank you!     Thank you for choosing [facility]  for your care. Our goal is always to provide you with excellent care. Hearing back from our patients is one way we can continue to improve our services. Please take a few minutes to complete the written survey that you may receive in the mail after your visit with us. Thank you!             Your Updated " Medication List - Protect others around you: Learn how to safely use, store and throw away your medicines at www.disposemymeds.org.          This list is accurate as of 11/29/18 11:59 PM.  Always use your most recent med list.                   Brand Name Dispense Instructions for use Diagnosis    escitalopram 10 MG tablet    LEXAPRO    45 tablet    Take 1.5 tablets (15 mg) by mouth daily    Other depression       prenatal multivitamin w/iron 27-0.8 MG tablet      Take 1 tablet by mouth daily

## 2018-11-30 NOTE — PROGRESS NOTES
"[facility] [place]  OB/GYN Clinic    CC: return OB    S: Feeling well. Last night had sharp stabbing pain on right side of uterus. No ctx, LOF or VB. Active baby.    O: VS: /66 (BP Location: Right arm, Patient Position: Chair, Cuff Size: Adult Regular)  Pulse 88  Temp 98  F (36.7  C) (Tympanic)  Resp 16  Ht 5' 2\" (1.575 m)  Wt 151 lb 12.8 oz (68.9 kg)  LMP 2018  BMI 27.76 kg/m2  Abd: tenderness over the right round ligament    See OB flowsheet    A/P: 27 yo  at 30w0d by LMP c/w 8w4d US.   Discussed comfort measures with round ligaement pain, reviewed precautions  S/p flu and Tdap today   3rd tri labs nl     Return in 2 weeks.     Carol Cool MD  OB/GYN   "

## 2018-12-11 ENCOUNTER — PRENATAL OFFICE VISIT (OUTPATIENT)
Dept: OBGYN | Facility: CLINIC | Age: 28
End: 2018-12-11
Payer: COMMERCIAL

## 2018-12-11 VITALS
BODY MASS INDEX: 28.52 KG/M2 | WEIGHT: 155 LBS | HEIGHT: 62 IN | RESPIRATION RATE: 18 BRPM | DIASTOLIC BLOOD PRESSURE: 63 MMHG | HEART RATE: 90 BPM | TEMPERATURE: 96.8 F | SYSTOLIC BLOOD PRESSURE: 109 MMHG

## 2018-12-11 DIAGNOSIS — Z34.83 ENCOUNTER FOR SUPERVISION OF OTHER NORMAL PREGNANCY IN THIRD TRIMESTER: Primary | ICD-10-CM

## 2018-12-11 DIAGNOSIS — F32.89 OTHER DEPRESSION: ICD-10-CM

## 2018-12-11 PROCEDURE — 99207 ZZC PRENATAL VISIT: CPT | Performed by: OBSTETRICS & GYNECOLOGY

## 2018-12-11 RX ORDER — ESCITALOPRAM OXALATE 10 MG/1
15 TABLET ORAL DAILY
Qty: 120 TABLET | Refills: 1 | Status: SHIPPED | OUTPATIENT
Start: 2018-12-11 | End: 2019-03-19

## 2018-12-11 ASSESSMENT — MIFFLIN-ST. JEOR: SCORE: 1376.33

## 2018-12-11 NOTE — PROGRESS NOTES
"CC: Here for routine prenatal visit @ 31w4d   HPI: + FM, no ctx, no LOF, no VB.  No complaints.     PE: /63 (BP Location: Right arm, Patient Position: Chair, Cuff Size: Adult Small)   Pulse 90   Temp 96.8  F (36  C) (Tympanic)   Resp 18   Ht 1.575 m (5' 2\")   Wt 70.3 kg (155 lb)   LMP 2018   BMI 28.35 kg/m     See OB flowsheet    A/P  @ 31w4d normal pregnancy    1. Routine prenatal care  2. Depression: stable on lexapro    RTC 2 weeks.      Annabel Clarke M.D.    "

## 2018-12-18 ENCOUNTER — MYC MEDICAL ADVICE (OUTPATIENT)
Dept: OBGYN | Facility: CLINIC | Age: 28
End: 2018-12-18

## 2018-12-27 ENCOUNTER — PRENATAL OFFICE VISIT (OUTPATIENT)
Dept: OBGYN | Facility: CLINIC | Age: 28
End: 2018-12-27
Payer: COMMERCIAL

## 2018-12-27 VITALS
BODY MASS INDEX: 29.52 KG/M2 | TEMPERATURE: 98.1 F | WEIGHT: 160.4 LBS | RESPIRATION RATE: 16 BRPM | HEIGHT: 62 IN | HEART RATE: 89 BPM | SYSTOLIC BLOOD PRESSURE: 105 MMHG | DIASTOLIC BLOOD PRESSURE: 65 MMHG

## 2018-12-27 DIAGNOSIS — Z34.80 PRENATAL CARE, SUBSEQUENT PREGNANCY, UNSPECIFIED TRIMESTER: Primary | ICD-10-CM

## 2018-12-27 DIAGNOSIS — G56.03 BILATERAL CARPAL TUNNEL SYNDROME: ICD-10-CM

## 2018-12-27 PROCEDURE — 99207 ZZC PRENATAL VISIT: CPT | Performed by: OBSTETRICS & GYNECOLOGY

## 2018-12-27 ASSESSMENT — MIFFLIN-ST. JEOR: SCORE: 1400.82

## 2018-12-27 NOTE — PROGRESS NOTES
"Initial /65 (BP Location: Left arm, Patient Position: Chair, Cuff Size: Adult Regular)   Pulse 89   Temp 98.1  F (36.7  C) (Tympanic)   Resp 16   Ht 1.575 m (5' 2\")   Wt 72.8 kg (160 lb 6.4 oz)   LMP 05/04/2018   BMI 29.34 kg/m   Estimated body mass index is 29.34 kg/m  as calculated from the following:    Height as of this encounter: 1.575 m (5' 2\").    Weight as of this encounter: 72.8 kg (160 lb 6.4 oz). .      "

## 2018-12-27 NOTE — PROGRESS NOTES
Concerns: Bilateral carpal tunnel  No vaginal bleeding, LOF.  No contractions.  Discussed kick counts and fetal movement.  Reportable signs and symptoms discussed.  Discussed kick counts and fetal movement.  Discussed PTL, PROM, and when to call or come in.  RTC 2 weeks.  Bilateral wrist splints  Breast pump\    Jesus Kellogg MD

## 2019-01-10 ENCOUNTER — PRENATAL OFFICE VISIT (OUTPATIENT)
Dept: OBGYN | Facility: CLINIC | Age: 29
End: 2019-01-10
Payer: COMMERCIAL

## 2019-01-10 VITALS
WEIGHT: 160 LBS | SYSTOLIC BLOOD PRESSURE: 111 MMHG | DIASTOLIC BLOOD PRESSURE: 65 MMHG | RESPIRATION RATE: 18 BRPM | HEIGHT: 62 IN | HEART RATE: 83 BPM | TEMPERATURE: 97.9 F | BODY MASS INDEX: 29.44 KG/M2

## 2019-01-10 DIAGNOSIS — Z34.83 PRENATAL CARE, SUBSEQUENT PREGNANCY IN THIRD TRIMESTER: Primary | ICD-10-CM

## 2019-01-10 PROCEDURE — 99207 ZZC PRENATAL VISIT: CPT | Performed by: OBSTETRICS & GYNECOLOGY

## 2019-01-10 PROCEDURE — 87653 STREP B DNA AMP PROBE: CPT | Performed by: OBSTETRICS & GYNECOLOGY

## 2019-01-10 ASSESSMENT — MIFFLIN-ST. JEOR: SCORE: 1399.01

## 2019-01-10 NOTE — PROGRESS NOTES
"CC: Here for routine prenatal visit @ 35w6d   HPI: + FM, no ctx, no LOF, no VB.  No complaints.     PE: /65 (BP Location: Left arm, Patient Position: Chair, Cuff Size: Adult Regular)   Pulse 83   Temp 97.9  F (36.6  C) (Tympanic)   Resp 18   Ht 1.575 m (5' 2\")   Wt 72.6 kg (160 lb)   LMP 2018   Breastfeeding? No   BMI 29.26 kg/m     See OB flowsheet    GBS today    A/P  @ 35w6d normal pregnancy    1. Routine prenatal care    RTC 1 week    Annabel Clarke M.D.    "

## 2019-01-10 NOTE — NURSING NOTE
"Initial /65 (BP Location: Left arm, Patient Position: Chair, Cuff Size: Adult Regular)   Pulse 83   Temp 97.9  F (36.6  C) (Tympanic)   Resp 18   Ht 1.575 m (5' 2\")   Wt 72.6 kg (160 lb)   LMP 05/04/2018   Breastfeeding? No   BMI 29.26 kg/m   Estimated body mass index is 29.26 kg/m  as calculated from the following:    Height as of this encounter: 1.575 m (5' 2\").    Weight as of this encounter: 72.6 kg (160 lb). .    Anjana Taipa, [facility]    "

## 2019-01-10 NOTE — PROGRESS NOTES
Prenatal Breastfeeding Education Toolkit provided for patient to review, helping her to make an informed decision on a feeding choice for her baby. Questions directed to the provider.  Anjana Tapia, [facility]

## 2019-01-11 LAB
GP B STREP DNA SPEC QL NAA+PROBE: NEGATIVE
SPECIMEN SOURCE: NORMAL

## 2019-01-18 ENCOUNTER — PRENATAL OFFICE VISIT (OUTPATIENT)
Dept: OBGYN | Facility: CLINIC | Age: 29
End: 2019-01-18
Payer: COMMERCIAL

## 2019-01-18 VITALS
SYSTOLIC BLOOD PRESSURE: 115 MMHG | RESPIRATION RATE: 16 BRPM | HEART RATE: 88 BPM | DIASTOLIC BLOOD PRESSURE: 71 MMHG | BODY MASS INDEX: 30.22 KG/M2 | HEIGHT: 62 IN | TEMPERATURE: 97.3 F | WEIGHT: 164.2 LBS

## 2019-01-18 DIAGNOSIS — Z34.83 PRENATAL CARE, SUBSEQUENT PREGNANCY IN THIRD TRIMESTER: Primary | ICD-10-CM

## 2019-01-18 PROCEDURE — 99207 ZZC PRENATAL VISIT: CPT | Performed by: OBSTETRICS & GYNECOLOGY

## 2019-01-18 ASSESSMENT — MIFFLIN-ST. JEOR: SCORE: 1418.06

## 2019-01-18 NOTE — PROGRESS NOTES
"CC: Here for routine prenatal visit @ 37w0d   HPI:  GBS negative; denies contractons or LOF; reviewed s/s of labor    PE: /71   Pulse 88   Temp 97.3  F (36.3  C)   Resp 16   Ht 1.575 m (5' 2\")   Wt 74.5 kg (164 lb 3.2 oz)   LMP 05/04/2018   BMI 30.03 kg/m     See OB flowsheet      A:  1. Prenatal care, subsequent pregnancy in third trimester        Routine prenatal care  RTC 1 weeks.      Rand Calle M.D.     "

## 2019-01-25 ENCOUNTER — PRENATAL OFFICE VISIT (OUTPATIENT)
Dept: OBGYN | Facility: CLINIC | Age: 29
End: 2019-01-25
Payer: COMMERCIAL

## 2019-01-25 VITALS
HEART RATE: 81 BPM | RESPIRATION RATE: 18 BRPM | HEIGHT: 62 IN | TEMPERATURE: 97.4 F | WEIGHT: 166.2 LBS | DIASTOLIC BLOOD PRESSURE: 77 MMHG | BODY MASS INDEX: 30.59 KG/M2 | SYSTOLIC BLOOD PRESSURE: 116 MMHG

## 2019-01-25 DIAGNOSIS — Z34.93 PRENATAL CARE IN THIRD TRIMESTER: Primary | ICD-10-CM

## 2019-01-25 PROCEDURE — 99207 ZZC PRENATAL VISIT: CPT | Performed by: OBSTETRICS & GYNECOLOGY

## 2019-01-25 ASSESSMENT — MIFFLIN-ST. JEOR: SCORE: 1427.13

## 2019-01-25 NOTE — PROGRESS NOTES
"[facility]  OB/GYN Clinic     CC: return OB     S: Feeling well. No ctx, LOF or VB. Active baby.     O: /77   Pulse 81   Temp 97.4  F (36.3  C)   Resp 18   Ht 1.575 m (5' 2\")   Wt 75.4 kg (166 lb 3.2 oz)   LMP 2018   BMI 30.40 kg/m       See OB flowsheet     A/P: 27 yo  at 38w0d by LMP c/w 8w4d US.   S/p flu and Tdap today   3rd tri labs nl   Planning , wants pills pp (discussed micronor), plans to breastfeed   Reviewed labor precautions. L&D # given.      Return weekly until delivery.      Carol Cool MD  OB/GYN       "

## 2019-02-01 ENCOUNTER — PRENATAL OFFICE VISIT (OUTPATIENT)
Dept: OBGYN | Facility: CLINIC | Age: 29
End: 2019-02-01
Payer: COMMERCIAL

## 2019-02-01 VITALS
BODY MASS INDEX: 30.36 KG/M2 | WEIGHT: 165 LBS | HEART RATE: 85 BPM | DIASTOLIC BLOOD PRESSURE: 74 MMHG | TEMPERATURE: 96.5 F | HEIGHT: 62 IN | SYSTOLIC BLOOD PRESSURE: 135 MMHG

## 2019-02-01 DIAGNOSIS — Z34.83 PRENATAL CARE, SUBSEQUENT PREGNANCY IN THIRD TRIMESTER: Primary | ICD-10-CM

## 2019-02-01 PROCEDURE — 99207 ZZC PRENATAL VISIT: CPT | Performed by: OBSTETRICS & GYNECOLOGY

## 2019-02-01 ASSESSMENT — MIFFLIN-ST. JEOR: SCORE: 1421.69

## 2019-02-01 NOTE — PROGRESS NOTES
"CC: Here for routine prenatal visit @ 39w0d   HPI:  No discreet contractions; reviewed s/s of labor    PE: /74 (BP Location: Right arm, Patient Position: Chair, Cuff Size: Adult Large)   Pulse 85   Temp 96.5  F (35.8  C) (Tympanic)   Ht 1.575 m (5' 2\")   Wt 74.8 kg (165 lb)   LMP 05/04/2018   BMI 30.18 kg/m     See OB flowsheet      A:  1. Prenatal care, subsequent pregnancy in third trimester        Routine prenatal care  RTC 1 weeks.      Rand Calle M.D.     "

## 2019-02-03 ENCOUNTER — HOSPITAL ENCOUNTER (INPATIENT)
Facility: CLINIC | Age: 29
LOS: 2 days | Discharge: HOME | End: 2019-02-05
Attending: OBSTETRICS & GYNECOLOGY | Admitting: OBSTETRICS & GYNECOLOGY
Payer: COMMERCIAL

## 2019-02-03 ENCOUNTER — ANESTHESIA EVENT (OUTPATIENT)
Dept: OBGYN | Facility: CLINIC | Age: 29
End: 2019-02-03
Payer: COMMERCIAL

## 2019-02-03 ENCOUNTER — ANESTHESIA (OUTPATIENT)
Dept: OBGYN | Facility: CLINIC | Age: 29
End: 2019-02-03
Payer: COMMERCIAL

## 2019-02-03 PROBLEM — Z34.80 PRENATAL CARE, SUBSEQUENT PREGNANCY: Status: ACTIVE | Noted: 2018-06-26

## 2019-02-03 LAB
ABO + RH BLD: NORMAL
ABO + RH BLD: NORMAL
ALBUMIN UR-MCNC: NEGATIVE MG/DL
AMORPH CRY #/AREA URNS HPF: ABNORMAL /HPF
AMPHETAMINES UR QL SCN: NEGATIVE
APPEARANCE UR: ABNORMAL
BACTERIA #/AREA URNS HPF: ABNORMAL /HPF
BARBITURATES UR QL: NEGATIVE
BASOPHILS # BLD AUTO: 0 10E9/L (ref 0–0.2)
BASOPHILS NFR BLD AUTO: 0.2 %
BENZODIAZ UR QL: NEGATIVE
BILIRUB UR QL STRIP: NEGATIVE
BLD GP AB SCN SERPL QL: NORMAL
BLOOD BANK CMNT PATIENT-IMP: NORMAL
CANNABINOIDS UR QL SCN: NEGATIVE
COCAINE UR QL: NEGATIVE
COLOR UR AUTO: YELLOW
DIFFERENTIAL METHOD BLD: ABNORMAL
EOSINOPHIL # BLD AUTO: 0 10E9/L (ref 0–0.7)
EOSINOPHIL NFR BLD AUTO: 0.2 %
ERYTHROCYTE [DISTWIDTH] IN BLOOD BY AUTOMATED COUNT: 13.2 % (ref 10–15)
GLUCOSE UR STRIP-MCNC: NEGATIVE MG/DL
HCT VFR BLD AUTO: 42 % (ref 35–47)
HGB BLD-MCNC: 13.9 G/DL (ref 11.7–15.7)
HGB UR QL STRIP: ABNORMAL
IMM GRANULOCYTES # BLD: 0.1 10E9/L (ref 0–0.4)
IMM GRANULOCYTES NFR BLD: 0.5 %
KETONES UR STRIP-MCNC: 20 MG/DL
LEUKOCYTE ESTERASE UR QL STRIP: ABNORMAL
LYMPHOCYTES # BLD AUTO: 1.2 10E9/L (ref 0.8–5.3)
LYMPHOCYTES NFR BLD AUTO: 6 %
MCH RBC QN AUTO: 31.4 PG (ref 26.5–33)
MCHC RBC AUTO-ENTMCNC: 33.1 G/DL (ref 31.5–36.5)
MCV RBC AUTO: 95 FL (ref 78–100)
MONOCYTES # BLD AUTO: 0.7 10E9/L (ref 0–1.3)
MONOCYTES NFR BLD AUTO: 3.7 %
MUCOUS THREADS #/AREA URNS LPF: PRESENT /LPF
NEUTROPHILS # BLD AUTO: 17.8 10E9/L (ref 1.6–8.3)
NEUTROPHILS NFR BLD AUTO: 89.4 %
NITRATE UR QL: NEGATIVE
NRBC # BLD AUTO: 0 10*3/UL
NRBC BLD AUTO-RTO: 0 /100
OPIATES UR QL SCN: NEGATIVE
PCP UR QL SCN: NEGATIVE
PH UR STRIP: 7 PH (ref 5–7)
PLATELET # BLD AUTO: 273 10E9/L (ref 150–450)
RBC # BLD AUTO: 4.43 10E12/L (ref 3.8–5.2)
RBC #/AREA URNS AUTO: <1 /HPF (ref 0–2)
SOURCE: ABNORMAL
SP GR UR STRIP: 1.01 (ref 1–1.03)
SPECIMEN EXP DATE BLD: NORMAL
SQUAMOUS #/AREA URNS AUTO: 1 /HPF (ref 0–1)
UROBILINOGEN UR STRIP-MCNC: 0 MG/DL (ref 0–2)
WBC # BLD AUTO: 19.8 10E9/L (ref 4–11)
WBC #/AREA URNS AUTO: 4 /HPF (ref 0–5)

## 2019-02-03 PROCEDURE — 25000128 H RX IP 250 OP 636: Performed by: NURSE ANESTHETIST, CERTIFIED REGISTERED

## 2019-02-03 PROCEDURE — 00HU33Z INSERTION OF INFUSION DEVICE INTO SPINAL CANAL, PERCUTANEOUS APPROACH: ICD-10-PCS | Performed by: OBSTETRICS & GYNECOLOGY

## 2019-02-03 PROCEDURE — 86901 BLOOD TYPING SEROLOGIC RH(D): CPT | Performed by: OBSTETRICS & GYNECOLOGY

## 2019-02-03 PROCEDURE — 10907ZC DRAINAGE OF AMNIOTIC FLUID, THERAPEUTIC FROM PRODUCTS OF CONCEPTION, VIA NATURAL OR ARTIFICIAL OPENING: ICD-10-PCS | Performed by: OBSTETRICS & GYNECOLOGY

## 2019-02-03 PROCEDURE — 86900 BLOOD TYPING SEROLOGIC ABO: CPT | Performed by: OBSTETRICS & GYNECOLOGY

## 2019-02-03 PROCEDURE — 40000671 ZZH STATISTIC ANESTHESIA CASE

## 2019-02-03 PROCEDURE — 85025 COMPLETE CBC W/AUTO DIFF WBC: CPT | Performed by: OBSTETRICS & GYNECOLOGY

## 2019-02-03 PROCEDURE — 25000132 ZZH RX MED GY IP 250 OP 250 PS 637: Performed by: OBSTETRICS & GYNECOLOGY

## 2019-02-03 PROCEDURE — 72200001 ZZH LABOR CARE VAGINAL DELIVERY SINGLE

## 2019-02-03 PROCEDURE — 80307 DRUG TEST PRSMV CHEM ANLYZR: CPT | Performed by: OBSTETRICS & GYNECOLOGY

## 2019-02-03 PROCEDURE — 59400 OBSTETRICAL CARE: CPT | Performed by: OBSTETRICS & GYNECOLOGY

## 2019-02-03 PROCEDURE — 81001 URINALYSIS AUTO W/SCOPE: CPT | Performed by: OBSTETRICS & GYNECOLOGY

## 2019-02-03 PROCEDURE — 86850 RBC ANTIBODY SCREEN: CPT | Performed by: OBSTETRICS & GYNECOLOGY

## 2019-02-03 PROCEDURE — 25000128 H RX IP 250 OP 636: Performed by: OBSTETRICS & GYNECOLOGY

## 2019-02-03 PROCEDURE — 25000125 ZZHC RX 250: Performed by: NURSE ANESTHETIST, CERTIFIED REGISTERED

## 2019-02-03 PROCEDURE — 86780 TREPONEMA PALLIDUM: CPT | Performed by: OBSTETRICS & GYNECOLOGY

## 2019-02-03 PROCEDURE — 3E0R3BZ INTRODUCTION OF ANESTHETIC AGENT INTO SPINAL CANAL, PERCUTANEOUS APPROACH: ICD-10-PCS | Performed by: OBSTETRICS & GYNECOLOGY

## 2019-02-03 PROCEDURE — 27110038 ZZH RX 271: Performed by: NURSE ANESTHETIST, CERTIFIED REGISTERED

## 2019-02-03 PROCEDURE — 12000000 ZZH R&B MED SURG/OB

## 2019-02-03 PROCEDURE — 37000011 ZZH ANESTHESIA WARD SERVICE: Performed by: NURSE ANESTHETIST, CERTIFIED REGISTERED

## 2019-02-03 RX ORDER — NALOXONE HYDROCHLORIDE 0.4 MG/ML
.1-.4 INJECTION, SOLUTION INTRAMUSCULAR; INTRAVENOUS; SUBCUTANEOUS
Status: DISCONTINUED | OUTPATIENT
Start: 2019-02-03 | End: 2019-02-05 | Stop reason: HOSPADM

## 2019-02-03 RX ORDER — BISACODYL 10 MG
10 SUPPOSITORY, RECTAL RECTAL DAILY PRN
Status: DISCONTINUED | OUTPATIENT
Start: 2019-02-05 | End: 2019-02-05 | Stop reason: HOSPADM

## 2019-02-03 RX ORDER — ALUMINA, MAGNESIA, AND SIMETHICONE 2400; 2400; 240 MG/30ML; MG/30ML; MG/30ML
30 SUSPENSION ORAL
Status: COMPLETED | OUTPATIENT
Start: 2019-02-03 | End: 2019-02-03

## 2019-02-03 RX ORDER — AMOXICILLIN 250 MG
1 CAPSULE ORAL 2 TIMES DAILY
Status: DISCONTINUED | OUTPATIENT
Start: 2019-02-03 | End: 2019-02-05 | Stop reason: HOSPADM

## 2019-02-03 RX ORDER — HYDROXYZINE HYDROCHLORIDE 50 MG/1
50 TABLET, FILM COATED ORAL EVERY 6 HOURS PRN
Status: DISCONTINUED | OUTPATIENT
Start: 2019-02-03 | End: 2019-02-03

## 2019-02-03 RX ORDER — BUPIVACAINE HYDROCHLORIDE 2.5 MG/ML
INJECTION, SOLUTION EPIDURAL; INFILTRATION; INTRACAUDAL
Status: COMPLETED
Start: 2019-02-03 | End: 2019-02-03

## 2019-02-03 RX ORDER — SODIUM CHLORIDE, SODIUM LACTATE, POTASSIUM CHLORIDE, CALCIUM CHLORIDE 600; 310; 30; 20 MG/100ML; MG/100ML; MG/100ML; MG/100ML
INJECTION, SOLUTION INTRAVENOUS CONTINUOUS
Status: DISCONTINUED | OUTPATIENT
Start: 2019-02-03 | End: 2019-02-03

## 2019-02-03 RX ORDER — EPHEDRINE SULFATE 50 MG/ML
5 INJECTION, SOLUTION INTRAMUSCULAR; INTRAVENOUS; SUBCUTANEOUS
Status: DISCONTINUED | OUTPATIENT
Start: 2019-02-03 | End: 2019-02-03

## 2019-02-03 RX ORDER — ACETAMINOPHEN 325 MG/1
650 TABLET ORAL EVERY 4 HOURS PRN
Status: DISCONTINUED | OUTPATIENT
Start: 2019-02-03 | End: 2019-02-03

## 2019-02-03 RX ORDER — PRENATAL VIT/IRON FUM/FOLIC AC 27MG-0.8MG
1 TABLET ORAL DAILY
Status: DISCONTINUED | OUTPATIENT
Start: 2019-02-03 | End: 2019-02-05 | Stop reason: HOSPADM

## 2019-02-03 RX ORDER — FENTANYL CITRATE 50 UG/ML
INJECTION, SOLUTION INTRAMUSCULAR; INTRAVENOUS PRN
Status: DISCONTINUED | OUTPATIENT
Start: 2019-02-03 | End: 2019-02-03

## 2019-02-03 RX ORDER — ACETAMINOPHEN 325 MG/1
650 TABLET ORAL EVERY 4 HOURS PRN
Status: DISCONTINUED | OUTPATIENT
Start: 2019-02-03 | End: 2019-02-05 | Stop reason: HOSPADM

## 2019-02-03 RX ORDER — LANOLIN 100 %
OINTMENT (GRAM) TOPICAL
Status: DISCONTINUED | OUTPATIENT
Start: 2019-02-03 | End: 2019-02-05 | Stop reason: HOSPADM

## 2019-02-03 RX ORDER — IBUPROFEN 800 MG/1
800 TABLET, FILM COATED ORAL
Status: DISCONTINUED | OUTPATIENT
Start: 2019-02-03 | End: 2019-02-03

## 2019-02-03 RX ORDER — OXYTOCIN/0.9 % SODIUM CHLORIDE 30/500 ML
100-340 PLASTIC BAG, INJECTION (ML) INTRAVENOUS CONTINUOUS PRN
Status: DISCONTINUED | OUTPATIENT
Start: 2019-02-03 | End: 2019-02-03

## 2019-02-03 RX ORDER — AMOXICILLIN 250 MG
2 CAPSULE ORAL 2 TIMES DAILY
Status: DISCONTINUED | OUTPATIENT
Start: 2019-02-03 | End: 2019-02-05 | Stop reason: HOSPADM

## 2019-02-03 RX ORDER — MISOPROSTOL 200 UG/1
400 TABLET ORAL
Status: DISCONTINUED | OUTPATIENT
Start: 2019-02-03 | End: 2019-02-05 | Stop reason: HOSPADM

## 2019-02-03 RX ORDER — ONDANSETRON 2 MG/ML
4 INJECTION INTRAMUSCULAR; INTRAVENOUS EVERY 6 HOURS PRN
Status: DISCONTINUED | OUTPATIENT
Start: 2019-02-03 | End: 2019-02-03

## 2019-02-03 RX ORDER — LIDOCAINE 40 MG/G
CREAM TOPICAL
Status: DISCONTINUED | OUTPATIENT
Start: 2019-02-03 | End: 2019-02-03

## 2019-02-03 RX ORDER — CARBOPROST TROMETHAMINE 250 UG/ML
250 INJECTION, SOLUTION INTRAMUSCULAR
Status: DISCONTINUED | OUTPATIENT
Start: 2019-02-03 | End: 2019-02-03

## 2019-02-03 RX ORDER — METHYLERGONOVINE MALEATE 0.2 MG/ML
200 INJECTION INTRAVENOUS
Status: DISCONTINUED | OUTPATIENT
Start: 2019-02-03 | End: 2019-02-03

## 2019-02-03 RX ORDER — IBUPROFEN 800 MG/1
800 TABLET, FILM COATED ORAL EVERY 6 HOURS PRN
Status: DISCONTINUED | OUTPATIENT
Start: 2019-02-03 | End: 2019-02-05 | Stop reason: HOSPADM

## 2019-02-03 RX ORDER — NALOXONE HYDROCHLORIDE 0.4 MG/ML
.1-.4 INJECTION, SOLUTION INTRAMUSCULAR; INTRAVENOUS; SUBCUTANEOUS
Status: DISCONTINUED | OUTPATIENT
Start: 2019-02-03 | End: 2019-02-03

## 2019-02-03 RX ORDER — NALOXONE HYDROCHLORIDE 0.4 MG/ML
.1-.4 INJECTION, SOLUTION INTRAMUSCULAR; INTRAVENOUS; SUBCUTANEOUS
Status: CANCELLED | OUTPATIENT
Start: 2019-02-03

## 2019-02-03 RX ORDER — OXYTOCIN/0.9 % SODIUM CHLORIDE 30/500 ML
100 PLASTIC BAG, INJECTION (ML) INTRAVENOUS CONTINUOUS
Status: DISCONTINUED | OUTPATIENT
Start: 2019-02-03 | End: 2019-02-05 | Stop reason: HOSPADM

## 2019-02-03 RX ORDER — OXYTOCIN 10 [USP'U]/ML
10 INJECTION, SOLUTION INTRAMUSCULAR; INTRAVENOUS
Status: DISCONTINUED | OUTPATIENT
Start: 2019-02-03 | End: 2019-02-05 | Stop reason: HOSPADM

## 2019-02-03 RX ORDER — OXYCODONE HYDROCHLORIDE 5 MG/1
5 TABLET ORAL EVERY 4 HOURS PRN
Status: DISCONTINUED | OUTPATIENT
Start: 2019-02-03 | End: 2019-02-05 | Stop reason: HOSPADM

## 2019-02-03 RX ORDER — DIPHENHYDRAMINE HCL 25 MG
25 CAPSULE ORAL EVERY 6 HOURS PRN
Status: DISCONTINUED | OUTPATIENT
Start: 2019-02-03 | End: 2019-02-03

## 2019-02-03 RX ORDER — DIPHENHYDRAMINE HYDROCHLORIDE 50 MG/ML
25 INJECTION INTRAMUSCULAR; INTRAVENOUS EVERY 6 HOURS PRN
Status: DISCONTINUED | OUTPATIENT
Start: 2019-02-03 | End: 2019-02-03

## 2019-02-03 RX ORDER — LIDOCAINE HYDROCHLORIDE AND EPINEPHRINE 15; 5 MG/ML; UG/ML
INJECTION, SOLUTION EPIDURAL PRN
Status: DISCONTINUED | OUTPATIENT
Start: 2019-02-03 | End: 2019-02-03

## 2019-02-03 RX ORDER — OXYTOCIN 10 [USP'U]/ML
10 INJECTION, SOLUTION INTRAMUSCULAR; INTRAVENOUS
Status: DISCONTINUED | OUTPATIENT
Start: 2019-02-03 | End: 2019-02-03

## 2019-02-03 RX ORDER — SCOLOPAMINE TRANSDERMAL SYSTEM 1 MG/1
1 PATCH, EXTENDED RELEASE TRANSDERMAL ONCE
Status: DISCONTINUED | OUTPATIENT
Start: 2019-02-03 | End: 2019-02-03

## 2019-02-03 RX ORDER — FENTANYL CITRATE 50 UG/ML
INJECTION, SOLUTION INTRAMUSCULAR; INTRAVENOUS
Status: COMPLETED
Start: 2019-02-03 | End: 2019-02-03

## 2019-02-03 RX ORDER — OXYCODONE AND ACETAMINOPHEN 5; 325 MG/1; MG/1
1 TABLET ORAL
Status: DISCONTINUED | OUTPATIENT
Start: 2019-02-03 | End: 2019-02-03

## 2019-02-03 RX ORDER — LIDOCAINE HYDROCHLORIDE 10 MG/ML
INJECTION, SOLUTION INFILTRATION; PERINEURAL PRN
Status: DISCONTINUED | OUTPATIENT
Start: 2019-02-03 | End: 2019-02-03

## 2019-02-03 RX ORDER — BUPIVACAINE HYDROCHLORIDE 2.5 MG/ML
INJECTION, SOLUTION EPIDURAL; INFILTRATION; INTRACAUDAL PRN
Status: DISCONTINUED | OUTPATIENT
Start: 2019-02-03 | End: 2019-02-03

## 2019-02-03 RX ORDER — ONDANSETRON 4 MG/1
4 TABLET, ORALLY DISINTEGRATING ORAL EVERY 6 HOURS PRN
Status: DISCONTINUED | OUTPATIENT
Start: 2019-02-03 | End: 2019-02-03

## 2019-02-03 RX ORDER — OXYTOCIN/0.9 % SODIUM CHLORIDE 30/500 ML
340 PLASTIC BAG, INJECTION (ML) INTRAVENOUS CONTINUOUS PRN
Status: DISCONTINUED | OUTPATIENT
Start: 2019-02-03 | End: 2019-02-05 | Stop reason: HOSPADM

## 2019-02-03 RX ORDER — HYDROXYZINE HYDROCHLORIDE 50 MG/1
100 TABLET, FILM COATED ORAL EVERY 6 HOURS PRN
Status: DISCONTINUED | OUTPATIENT
Start: 2019-02-03 | End: 2019-02-03

## 2019-02-03 RX ORDER — HYDROCORTISONE 2.5 %
CREAM (GRAM) TOPICAL 3 TIMES DAILY PRN
Status: DISCONTINUED | OUTPATIENT
Start: 2019-02-03 | End: 2019-02-05 | Stop reason: HOSPADM

## 2019-02-03 RX ADMIN — SODIUM CHLORIDE, POTASSIUM CHLORIDE, SODIUM LACTATE AND CALCIUM CHLORIDE: 600; 310; 30; 20 INJECTION, SOLUTION INTRAVENOUS at 04:10

## 2019-02-03 RX ADMIN — ACETAMINOPHEN 650 MG: 325 TABLET, FILM COATED ORAL at 05:26

## 2019-02-03 RX ADMIN — ALUMINUM HYDROXIDE, MAGNESIUM HYDROXIDE, AND DIMETHICONE 30 ML: 400; 400; 40 SUSPENSION ORAL at 15:34

## 2019-02-03 RX ADMIN — Medication 10 ML/HR: at 10:05

## 2019-02-03 RX ADMIN — HYDROXYZINE HYDROCHLORIDE 100 MG: 50 TABLET ORAL at 05:41

## 2019-02-03 RX ADMIN — PRENATAL VIT W/ FE FUMARATE-FA TAB 27-0.8 MG 1 TABLET: 27-0.8 TAB at 21:21

## 2019-02-03 RX ADMIN — SODIUM CHLORIDE, POTASSIUM CHLORIDE, SODIUM LACTATE AND CALCIUM CHLORIDE 1000 ML: 600; 310; 30; 20 INJECTION, SOLUTION INTRAVENOUS at 10:52

## 2019-02-03 RX ADMIN — IBUPROFEN 800 MG: 800 TABLET ORAL at 21:21

## 2019-02-03 RX ADMIN — LIDOCAINE HYDROCHLORIDE 80 MG: 10 INJECTION, SOLUTION INFILTRATION; PERINEURAL at 09:37

## 2019-02-03 RX ADMIN — FENTANYL CITRATE 100 MCG: 50 INJECTION, SOLUTION INTRAMUSCULAR; INTRAVENOUS at 10:05

## 2019-02-03 RX ADMIN — SODIUM CHLORIDE, POTASSIUM CHLORIDE, SODIUM LACTATE AND CALCIUM CHLORIDE: 600; 310; 30; 20 INJECTION, SOLUTION INTRAVENOUS at 11:44

## 2019-02-03 RX ADMIN — LIDOCAINE HYDROCHLORIDE AND EPINEPHRINE 75 MG: 15; 5 INJECTION, SOLUTION EPIDURAL at 09:56

## 2019-02-03 RX ADMIN — BUPIVACAINE HYDROCHLORIDE 5 ML: 2.5 INJECTION, SOLUTION EPIDURAL; INFILTRATION; INTRACAUDAL at 10:05

## 2019-02-03 ASSESSMENT — ACTIVITIES OF DAILY LIVING (ADL)
BATHING: 0-->INDEPENDENT
DRESS: 0-->INDEPENDENT
TRANSFERRING: 0-->INDEPENDENT
RETIRED_COMMUNICATION: 0-->UNDERSTANDS/COMMUNICATES WITHOUT DIFFICULTY
TOILETING: 0-->INDEPENDENT
SWALLOWING: 0-->SWALLOWS FOODS/LIQUIDS WITHOUT DIFFICULTY
FALL_HISTORY_WITHIN_LAST_SIX_MONTHS: NO
AMBULATION: 0-->INDEPENDENT
RETIRED_EATING: 0-->INDEPENDENT
COGNITION: 0 - NO COGNITION ISSUES REPORTED

## 2019-02-03 ASSESSMENT — LIFESTYLE VARIABLES: TOBACCO_USE: 1

## 2019-02-03 ASSESSMENT — MIFFLIN-ST. JEOR: SCORE: 1437.57

## 2019-02-03 NOTE — TELEPHONE ENCOUNTER
"D: Lanie Martin 1990   39w2d   Patient called due to  cramping.  Reports that she last saw Dr. ALIREZA Kellogg on 18  Last cervical exam: 1.5  A: Contractions: frequency q 3-4 minutes, lasting 25 sec since  .  Fluid leaking: \"mucus plug\"   Vaginal Bleeding: none  Fetal movement: active  Pain:Denies   R: Instructed patient to Increase po fluids, rest and monitor contractions for 30 min- one hour and call back, unless she falls asleep. Instructed to call back with update if indicated or any concerns including increasing symptoms.  Discussed plan with patient and she agrees.       "

## 2019-02-03 NOTE — ANESTHESIA PROCEDURE NOTES
Peripheral nerve/Neuraxial procedure note : epidural catheter  Pre-Procedure  Performed by  Lakshmi Dave APRN CRNA   Location: OB    Procedure Times:2/3/2019 9:35 AM and 2/3/2019 9:42 AM  Pre-Anesthestic Checklist: patient identified, IV checked, risks and benefits discussed, informed consent, monitors and equipment checked, pre-op evaluation and at physician/surgeon's request    Timeout  Correct Patient: Yes   Correct Procedure: Yes   Correct Site: Yes   Correct Laterality: N/A   Correct Position: Yes   Site Marked: N/A   .   Procedure Documentation    .    Procedure:    Epidural catheter.   (midline approach) Injection technique: LORT saline   Local skin infiltrated with mL of 1% lidocaine.       Patient Prep;mask, sterile gloves, patient draped.  .  Needle: Touhy needle Needle Gauge: 17.    Needle Length (Inches) 3.5  # of attempts: 1 and # of redirects:  .   Catheter: 19 G . .  Catheter threaded easily  4 cm epidural space.  10 cm at skin.   .    Assessment/Narrative  Paresthesias: No.  .  .  Aspiration negative for heme or CSF  . Test dose of mL at. Test dose negative for signs of intravascular, subdural or intrathecal injection. Comments:  VAS pain score prior to epidural:8/10  VAS pain score after epidural:0/10    Pt. Tolerated well, FHR stable.    0942--Placement of epidural only--will bolus and start when patient requests

## 2019-02-03 NOTE — PLAN OF CARE
By 0830 she is 3.5 cm and 95% effaced  contractions are continuing but patient is not quite ready for an epidural.

## 2019-02-03 NOTE — ANESTHESIA PREPROCEDURE EVALUATION
Anesthesia Pre-Procedure Evaluation    Patient: Lanie Martin   MRN: [id number] : 1990          Preoperative Diagnosis: * No pre-op diagnosis entered *    * No procedures listed *    Past Medical History:   Diagnosis Date     Chickenpox      Depression      Depressive disorder      Past Surgical History:   Procedure Laterality Date     AS CREATE EARDRUM OPENING,GEN ANESTH       MOUTH SURGERY      wisdom teeth       Anesthesia Evaluation       history and physical reviewed . Pt has had prior anesthetic.     No history of anesthetic complications          ROS/MED HX    ENT/Pulmonary:     (+)tobacco use, Past use hx of marijuana use packs/day  , . .    Neurologic:  - neg neurologic ROS     Cardiovascular:  - neg cardiovascular ROS       METS/Exercise Tolerance:  >4 METS   Hematologic:  - neg hematologic  ROS       Musculoskeletal:  - neg musculoskeletal ROS       GI/Hepatic:  - neg GI/hepatic ROS       Renal/Genitourinary:  - ROS Renal section negative       Endo:  - neg endo ROS       Psychiatric:     (+) psychiatric history depression      Infectious Disease:  - neg infectious disease ROS       Malignancy:      - no malignancy   Other:    (+) Possibly pregnant                    neg OB ROS            Physical Exam  Normal systems: cardiovascular, pulmonary and dental    Airway   Mallampati: II  TM distance: >3 FB  Neck ROM: full    Dental     Cardiovascular       Pulmonary             Lab Results   Component Value Date    WBC 19.8 (H) 2019    HGB 13.9 2019    HCT 42.0 2019     2019       Preop Vitals  BP Readings from Last 3 Encounters:   19 139/69   19 135/74   19 116/77    Pulse Readings from Last 3 Encounters:   19 85   19 81   19 88      Resp Readings from Last 3 Encounters:   19 16   19 18   19 16    SpO2 Readings from Last 3 Encounters:   19 98%   11 100%      Temp Readings from Last 1 Encounters:  "  02/03/19 36.7  C (98  F) (Oral)    Ht Readings from Last 1 Encounters:   02/03/19 1.6 m (5' 3\")      Wt Readings from Last 1 Encounters:   02/03/19 74.8 kg (165 lb)    Estimated body mass index is 29.23 kg/m  as calculated from the following:    Height as of this encounter: 1.6 m (5' 3\").    Weight as of this encounter: 74.8 kg (165 lb).       Anesthesia Plan      History & Physical Review  History and physical reviewed and following examination; no interval change.    ASA Status:  2 .  OB Epidural Asa: 2       Plan for Epidural   PONV prophylaxis:  Ondansetron (or other 5HT-3), Dexamethasone or Solumedrol and Scopolamine patch       Postoperative Care  Postoperative pain management:  IV analgesics and Oral pain medications.      Consents  Anesthetic plan, risks, benefits and alternatives discussed with:  Patient, Patient and Spouse..                 GAMALIEL Griggs CRNA  "

## 2019-02-03 NOTE — PROGRESS NOTES
0536 pt states given the option to walk or go home if no cervical change, pt and  state that would rather rest her for a little while until road condition gets better. Vistaril 100 mg and tylenol 650 mg given and pt moved to room 2050. Noted some green mucous appearing discharge on bed.  Pt instructed to let me know if leaking or more discharge noted.

## 2019-02-03 NOTE — PLAN OF CARE
SVE 9/90/0 with slight swelling over anterior cervix. Mild caput felt on infant's head. FHT category 2 with infrequent VD's, baseline 140, +accels. Patient feeling increased pain to lower abdomen, using epidural PCA and states is coping. FOB and patient's friend are present at bedside and supportive.

## 2019-02-03 NOTE — PLAN OF CARE
Epidural was placed today at 1000  she tolerated the placement well  she still is not ready for it to be dosed however  she is up ad eleni in her room and is coping with her labor well  SO is supportive FHR tracing has remained cat 1

## 2019-02-04 LAB — T PALLIDUM AB SER QL: NONREACTIVE

## 2019-02-04 PROCEDURE — 25000132 ZZH RX MED GY IP 250 OP 250 PS 637: Performed by: OBSTETRICS & GYNECOLOGY

## 2019-02-04 PROCEDURE — 12000000 ZZH R&B MED SURG/OB

## 2019-02-04 RX ADMIN — ESCITALOPRAM OXALATE 15 MG: 10 TABLET, FILM COATED ORAL at 07:49

## 2019-02-04 RX ADMIN — PRENATAL VIT W/ FE FUMARATE-FA TAB 27-0.8 MG 1 TABLET: 27-0.8 TAB at 07:49

## 2019-02-04 RX ADMIN — IBUPROFEN 800 MG: 800 TABLET ORAL at 04:17

## 2019-02-04 RX ADMIN — IBUPROFEN 800 MG: 800 TABLET ORAL at 20:55

## 2019-02-04 NOTE — PROGRESS NOTES
[facility] Obstetrics Post-Partum Progress Note          Assessment and Plan:    Assessment:   Post-partum day #1  Normal spontaneous vaginal delivery  L&D complications: Intrauterine pregnancy at 39+2 weeks gestation      Doing well.  No excessive bleeding  Pain well-controlled.      Plan:   Ambulation encouraged  Breast feeding strategies discussed           Interval History:   Doing well.  Pain is well-controlled.  No fevers.  No history of foul-smelling vaginal discharge.  Good appetite.  Denies chest pain, shortness of breath, nausea or vomiting.  Vaginal bleeding is similar to a heavy menstrual flow.  Ambulatory.  Breastfeeding well.          Significant Problems:    None          Review of Systems:    The patient denies any chest pain, shortness of breath, excessive pain, fever, chills, purulent drainage from the wound, nausea or vomiting.          Medications:   All medications related to the patient's surgery have been reviewed          Physical Exam:   Vitals were reviewed  All vitals stable  Temp: 98  F (36.7  C) Temp src: Oral BP: 115/67 Pulse: 92 Heart Rate: 92 Resp: 18 SpO2: 97 % O2 Device: None (Room air)    Uterine fundus is firm, non-tender and at the level of the umbilicus          Data:     All laboratory data related to this surgery reviewed  Hemoglobin   Date Value Ref Range Status   02/03/2019 13.9 11.7 - 15.7 g/dL Final   10/23/2018 12.1 11.7 - 15.7 g/dL Final   07/03/2018 12.7 11.7 - 15.7 g/dL Final         Jesus Kellogg MD

## 2019-02-04 NOTE — PLAN OF CARE
Patient progressing well.  Ambulating, Eating and voiding well. Independent with mother/baby cares. Bonding well with infant.  Breast feeding is going well, baby is latching well and feeding for good lengths of time. Patient rates left wrist Pain tolerable with discomfort.  Taking Ibuprofen when due with good relief.  Made plan with patient to offer pain medication when due. Patient encouraged to report increased bleeding or clots.   rooming in and is supportive and helpful.  Parents both asking appropriate questions regarding the care of there .  Father demonstrated diaper change correctly with RN help.  Mother and father instructed how to use bulb syringe correctly.  Father demonstrated how to dress infant correctly.

## 2019-02-04 NOTE — ANESTHESIA POSTPROCEDURE EVALUATION
Patient: Lanie Martin    * No procedures listed *    Diagnosis:* No pre-op diagnosis entered *  Diagnosis Additional Information: No value filed.    Anesthesia Type:  No value filed.    Note:  Anesthesia Post Evaluation    Patient location during evaluation: Floor  Patient participation: Able to fully participate in evaluation  Level of consciousness: awake and alert  Pain management: adequate  Airway patency: patent  Cardiovascular status: stable  Respiratory status: room air  Hydration status: stable  PONV: none     Anesthetic complications: None          Last vitals:  Vitals:    02/03/19 2014 02/03/19 2029 02/03/19 2048   BP: 131/70 (!) 122/96 121/70   Resp:  16 16   Temp:      SpO2:            Electronically Signed By: GAMALIEL Griggs CRNA  February 3, 2019  10:49 PM

## 2019-02-04 NOTE — L&D DELIVERY NOTE
"Delivery Summary    Lanie Martin MRN# [id number]   Age: 28 year old YOB: 1990     ASSESSMENT & PLAN: 28 year old  presented @ 39w2d to BC for active labor management.     Stage 1: AROM clear, epidural for analgesia.  Normal labor progress  Stage 2: pushed x 43 min     of viable male, \"Hany\", 8#6, Apgars 9/9  Placenta with 3vc  Lacerations: none  Mother and infant stable.    Annabel Clarke M.D.          Labor Event Times    Dilation complete date:  2/3/19 Complete time:   5:45 PM   Start pushing date/time:  2/3/2019 1750      Labor Length    2nd Stage (hrs):  0 (min):  48   3rd Stage (hrs):  0 (min):  8      Labor Events     labor?:  No  Labor Type:  Spontaneous  Predominate monitoring during 1st stage:  continuous electronic fetal monitoring     Antibiotics received during labor?:  No     Rupture date/time: 2/3/19 1030   Rupture type:  Artificial Rupture of Membranes  Fluid color:  Clear  Fluid odor:  Normal     1:1 continuous labor support provided by?:  RN       Delivery/Placenta Date and Time    Delivery Date:  2/3/19 Delivery Time:   6:33 PM   Placenta Date/Time:  2/3/2019  6:41 PM  Oxytocin given at the time of delivery:  after delivery of baby     Vaginal Counts     Initial count performed by 2 team members:   Two Team Members   Arvind Clarke       [employer]   Initial counts 2  5    Added to count       Final counts       Placed during labor Accounted for at the end of labor   NA NA   NA NA   NA NA    Final count performed by 2 team members:   Two Team Members   Arvind Clarke         Apgars    Living status:  Living   1 Minute 5 Minute 10 Minute 15 Minute 20 Minute   Skin color: 1  1       Heart rate: 2  2       Reflex irritability: 2  2       Muscle tone: 2  2       Respiratory effort: 2  2       Total: 9  9       Apgars assigned by:  FLAVIA DUVAL RN     Cord    Vessels:  3 Vessels Complications:  None   Cord " "Blood Disposition:  Lab Gases Sent?:  No       Resuscitation    Methods:  None  Output in Delivery Room:  Stool      Measurements    Weight:  8 lb 6.4 oz Length:  1' 8\"   Head circumference:  35.6 cm       Skin to Skin and Feeding Plan    Skin to skin initiation date/time: 1841    Skin to skin with:  Mother  Skin to skin end date/time:     Breastfeeding initiated date/time:  2/3/2019 1900  How do you plan to feed your baby:  Breastfeeding     Labor Events and Shoulder Dystocia    Fetal Tracing Prior to Delivery:  Category 1  Shoulder dystocia present?:  Neg     Delivery (Maternal) (Provider to Complete) (311546)    Episiotomy:  None  Perineal lacerations:  None    Vaginal laceration?:  No    Cervical laceration?:  No       Blood Loss  Mother: Lanie Martin Cristel #[id number]   Start of Mother's Information    IO Blood Loss  19 0633 - 19 1833    Total QBL Blood Loss (mL) Hospital Encounter 123 mL    Total  123 mL         End of Mother's Information  Mother: VeronicaRichard [email or web address] Fam #[id number]         Delivery - Provider to Complete (390477)    Delivering clinician:  Annabel Clarke MD  Attempted Delivery Types (Choose all that apply):  Spontaneous Vaginal Delivery  Delivery Type (Choose the 1 that will go to the Birth History):  Vaginal, Spontaneous   Other personnel:   Provider Role   Sylvia Sagastume, RN Delivery Nurse   Yolanda Collazo RN Charge Nurse         Placenta    Delayed Cord Clamping:  Done  Date/Time:  2/3/2019  6:41 PM  Removal:  Spontaneous  Disposition:  Hospital disposal     Anesthesia    Method:  Epidural          Presentation and Position    Presentation:  Vertex          Annabel Clarke MD  "

## 2019-02-04 NOTE — ANESTHESIA CARE TRANSFER NOTE
Patient: Lanie Martin    * No procedures listed *    Diagnosis: * No pre-op diagnosis entered *  Diagnosis Additional Information: No value filed.    Anesthesia Type:   No value filed.     Note:  Airway :Room Air  Patient transferred to:Labor and Delivery  Handoff Report: Identifed the Patient, Identified the Reponsible Provider, Reviewed the pertinent medical history, Discussed the surgical course, Reviewed Intra-OP anesthesia mangement and issues during anesthesia, Set expectations for post-procedure period and Allowed opportunity for questions and acknowledgement of understanding      Vitals: (Last set prior to Anesthesia Care Transfer)              Electronically Signed By: GAMALIEL Griggs CRNA  February 3, 2019  10:49 PM

## 2019-02-04 NOTE — PLAN OF CARE
Mother and baby transferred to postpartum unit at 2240 via eunice paniagua after completion of immediate recovery period. Patient oriented to room. Mother and baby bonding well and in stable condition upon transfer.

## 2019-02-04 NOTE — PLAN OF CARE
S:Delivery  B:Spontaneous Labor,39w2d    Lab Results   Component Value Date    GBS Negative 01/10/2019    with antibiotic treatment not indicated 4 hours prior to delivery.  A: Patient delivered   none, intact at 1833 with Dr. YOLANDA Clarke in attendance and baby placed on mother's abdomen for delayed cord clamping. Baby dried and stimulated. Baby placed  skin to skin @ 1833.. Apgars 9/9.  IV infusion of Oxytocin  infused. Placenta removal spontaneous. MD does not want placenta sent to pathology.  See Flowsheet for VS and PP checks.  .  Labor care plan goals met, transition now to postpartum care.  R: Expect routine postpartum care. Anticipate first feeding within the hour or whenever infant displays feeding cues. Continue skin to skin. Prior discussion with mother indicates that feeding plan is Breast feeding . Educated mother on importance of exclusive breastfeeding, expected feeding readiness cues and encouraged her to observe for these cues while rooming in. Informed her that breastfeeding assistance would be provided.

## 2019-02-05 VITALS
WEIGHT: 165 LBS | TEMPERATURE: 97.4 F | HEART RATE: 82 BPM | RESPIRATION RATE: 18 BRPM | SYSTOLIC BLOOD PRESSURE: 116 MMHG | BODY MASS INDEX: 29.23 KG/M2 | DIASTOLIC BLOOD PRESSURE: 74 MMHG | OXYGEN SATURATION: 97 % | HEIGHT: 63 IN

## 2019-02-05 PROCEDURE — 25000132 ZZH RX MED GY IP 250 OP 250 PS 637: Performed by: OBSTETRICS & GYNECOLOGY

## 2019-02-05 RX ADMIN — PRENATAL VIT W/ FE FUMARATE-FA TAB 27-0.8 MG 1 TABLET: 27-0.8 TAB at 07:44

## 2019-02-05 RX ADMIN — IBUPROFEN 800 MG: 800 TABLET ORAL at 10:25

## 2019-02-05 RX ADMIN — ESCITALOPRAM OXALATE 15 MG: 10 TABLET, FILM COATED ORAL at 08:02

## 2019-02-05 RX ADMIN — IBUPROFEN 800 MG: 800 TABLET ORAL at 04:03

## 2019-02-05 RX ADMIN — ACETAMINOPHEN 650 MG: 325 TABLET, FILM COATED ORAL at 07:45

## 2019-02-05 NOTE — PLAN OF CARE
Discharge instructions printed out and gone over with patient and significant other. Patient verbalized understanding and had all questions answered.  Information given to patient on post partum education.   Discharge Medications gone over with patient and prescriptions sent to retail pharmacy for  on D/C.  Signature obtained. Pt escorted out with nursing staff and all belongings in hand to vehicle at 15:10.

## 2019-02-05 NOTE — PLAN OF CARE
Patient progressing well.  Ambulating, Eating and voiding well. Independent with mother/baby cares. Bonding well with infant.  Breast feeding is going well, baby is latching well and feeding for good lengths of time. Patient rates uterine cramping Pain comfortably manageable. Patient also has some pain and swelling in both hands due to her carpal tunnel syndrome.  Patient is wearing hand braces at night.   Taking Ibuprofen when due with good relief and applying heat packs to abdomen for comfort.  Made plan with patient to bring pain medication when she requests. Patient encouraged to report increased bleeding or clots.  is rooming in and is very supportive and helpful.

## 2019-02-05 NOTE — PROGRESS NOTES
"PPD # 2    S: patient without complaints  O: /74 (BP Location: Right arm)   Pulse 82   Temp 97.4  F (36.3  C) (Oral)   Resp 18   Ht 1.6 m (5' 3\")   Wt 74.8 kg (165 lb)   LMP 2018   SpO2 97%   Breastfeeding? Unknown   BMI 29.23 kg/m     NAD  Abd: soft, nontender, fundus firm  Ext: calves nontender    A/P PPD # 2 s/p     Routine PP care.  Discharge home.      Annabel Clarke M.D.    "

## 2019-02-05 NOTE — DISCHARGE SUMMARY
"[facility] Discharge Summary    Lanie Martin MRN# [id number]   Age: 28 year old YOB: 1990     Date of Admission:  2/3/2019  Date of Discharge::  2019  Admitting Physician:  Annabel Clarke MD  Discharge Physician:  Annabel Clarke MD     Home clinic: [facility]          Admission Diagnoses:   Intrauterine pregnancy at 39w2d  weeks gestation          Discharge Diagnosis:   Normal spontaneous vaginal delivery  Intrauterine pregnancy at 39w2d  weeks gestation          Procedures:   Procedure(s): No additional procedures performed       No other procedures performed during this admission           Medications Prior to Admission:     Medications Prior to Admission   Medication Sig Dispense Refill Last Dose     escitalopram (LEXAPRO) 10 MG tablet Take 1.5 tablets (15 mg) by mouth daily 120 tablet 1 2019 at am     Prenatal Vit-Fe Fumarate-FA (PRENATAL MULTIVITAMIN PLUS IRON) 27-0.8 MG TABS per tablet Take 1 tablet by mouth daily   2019 at pm             Discharge Medications:     Current Discharge Medication List      CONTINUE these medications which have NOT CHANGED    Details   escitalopram (LEXAPRO) 10 MG tablet Take 1.5 tablets (15 mg) by mouth daily  Qty: 120 tablet, Refills: 1    Associated Diagnoses: Other depression      Prenatal Vit-Fe Fumarate-FA (PRENATAL MULTIVITAMIN PLUS IRON) 27-0.8 MG TABS per tablet Take 1 tablet by mouth daily                   Consultations:   No consultations were requested during this admission          Brief History of Labor:   28 year old  presented @ 39w2d to BC for active labor management.      Stage 1: AROM clear, epidural for analgesia.  Normal labor progress  Stage 2: pushed x 43 min      of viable male, \"Hany\", 8#6, Apgars 9/9  Placenta with 3vc  Lacerations: none  Mother and infant stable.         Hospital Course:   The patient's hospital course was unremarkable.  On discharge, her pain was well controlled. " Vaginal bleeding is similar to peak menstrual flow.  Voiding without difficulty.  Ambulating well and tolerating a normal diet.  No fever.  Breastfeeding well.  Infant is stable.  She was discharged on post-partum day #2.    Post-partum hemoglobin:   Hemoglobin   Date Value Ref Range Status   02/03/2019 13.9 11.7 - 15.7 g/dL Final             Discharge Instructions and Follow-Up:   Discharge diet: Regular   Discharge activity: No sex for 6 week(s)   Discharge follow-up: Follow up with OB clinic in 6 weeks   Wound care: Drink plenty of fluids  Ice to area for comfort           Discharge Disposition:   Discharged to home      Attestation:  I have reviewed today's vital signs, notes, medications, labs and imaging.    Annabel Clarke MD

## 2019-02-05 NOTE — PLAN OF CARE
VS stable, HR regular, lungs clear.  Minimal uterine cramping pain, c/o some carpal tunnel pain which has not required medication this shift.  Light rubra lochia without clots noted, patient took a tub bath today, is up independently in her room, appetite good, voiding WDL.  Some mild nipple tenderness beginning- instructed re: proper latching, positioning, and breast care.  Will continue to monitor.

## 2019-02-05 NOTE — DISCHARGE INSTRUCTIONS
Discharge diet: Regular   Discharge activity: No sex for 6 week(s)   Discharge follow-up: Follow up with OB clinic in 6 weeks   Wound care: Drink plenty of fluids  Ice to area for comfort       Postpartum Vaginal Delivery Instructions    Activity       Ask family and friends for help when you need it.    Do not place anything in your vagina for 6 weeks.    You are not restricted on other activities, but take it easy for a few weeks to allow your body to recover from delivery.  You are able to do any activities you feel up to that point.    No driving until you have stopped taking your pain medications (usually two weeks after delivery).     Call your health care provider if you have any of these symptoms:       Increased pain, swelling, redness, or fluid around your stiches from an episiotomy or perineal tear.    A fever above 100.4 F (38 C) with or without chills when placing a thermometer under your tongue.    You soak a sanitary pad with blood within 1 hour, or you see blood clots larger than a golf ball.    Bleeding that lasts more than 6 weeks.    Vaginal discharge that smells bad.    Severe pain, cramping or tenderness in your lower belly area.    A need to urinate more frequently (use the toilet more often), more urgently (use the toilet very quickly), or it burns when you urinate.    Nausea and vomiting.    Redness, swelling or pain around a vein in your leg.    Problems breastfeeding or a red or painful area on your breast.    Chest pain and cough or are gasping for air.    Problems coping with sadness, anxiety, or depression.  If you have any concerns about hurting yourself or the baby, call your provider immediately.     You have questions or concerns after you return home.     Keep your hands clean:  Always wash your hands before touching your perineal area and stitches.  This helps reduce your risk of infection.  If your hands aren't dirty, you may use an alcohol hand-rub to clean your hands. Keep your  nails clean and short.

## 2019-03-19 ENCOUNTER — PRENATAL OFFICE VISIT (OUTPATIENT)
Dept: OBGYN | Facility: CLINIC | Age: 29
End: 2019-03-19
Payer: COMMERCIAL

## 2019-03-19 VITALS
RESPIRATION RATE: 16 BRPM | BODY MASS INDEX: 25.28 KG/M2 | HEIGHT: 62 IN | DIASTOLIC BLOOD PRESSURE: 55 MMHG | SYSTOLIC BLOOD PRESSURE: 93 MMHG | HEART RATE: 69 BPM | TEMPERATURE: 98 F | WEIGHT: 137.4 LBS

## 2019-03-19 DIAGNOSIS — F32.89 OTHER DEPRESSION: ICD-10-CM

## 2019-03-19 PROBLEM — Z34.80 PRENATAL CARE, SUBSEQUENT PREGNANCY: Status: RESOLVED | Noted: 2018-06-26 | Resolved: 2019-03-19

## 2019-03-19 PROCEDURE — 99207 ZZC POST PARTUM EXAM: CPT | Performed by: OBSTETRICS & GYNECOLOGY

## 2019-03-19 RX ORDER — ESCITALOPRAM OXALATE 10 MG/1
15 TABLET ORAL DAILY
Qty: 120 TABLET | Refills: 3 | Status: SHIPPED | OUTPATIENT
Start: 2019-03-19 | End: 2021-03-05

## 2019-03-19 ASSESSMENT — ANXIETY QUESTIONNAIRES
5. BEING SO RESTLESS THAT IT IS HARD TO SIT STILL: NOT AT ALL
1. FEELING NERVOUS, ANXIOUS, OR ON EDGE: NOT AT ALL
GAD7 TOTAL SCORE: 0
2. NOT BEING ABLE TO STOP OR CONTROL WORRYING: NOT AT ALL
7. FEELING AFRAID AS IF SOMETHING AWFUL MIGHT HAPPEN: NOT AT ALL
3. WORRYING TOO MUCH ABOUT DIFFERENT THINGS: NOT AT ALL
6. BECOMING EASILY ANNOYED OR IRRITABLE: NOT AT ALL

## 2019-03-19 ASSESSMENT — MIFFLIN-ST. JEOR: SCORE: 1296.49

## 2019-03-19 ASSESSMENT — PATIENT HEALTH QUESTIONNAIRE - PHQ9
SUM OF ALL RESPONSES TO PHQ QUESTIONS 1-9: 1
5. POOR APPETITE OR OVEREATING: NOT AT ALL

## 2019-03-19 NOTE — NURSING NOTE
"Initial BP 93/55 (BP Location: Left arm, Patient Position: Chair, Cuff Size: Adult Regular)   Pulse 69   Temp 98  F (36.7  C) (Tympanic)   Resp 16   Ht 1.575 m (5' 2\")   Wt 62.3 kg (137 lb 6.4 oz)   LMP 05/04/2018   Breastfeeding? Yes   BMI 25.13 kg/m   Estimated body mass index is 25.13 kg/m  as calculated from the following:    Height as of this encounter: 1.575 m (5' 2\").    Weight as of this encounter: 62.3 kg (137 lb 6.4 oz). .    Anjana Tapia, KEI    "
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
## 2019-03-20 ASSESSMENT — ANXIETY QUESTIONNAIRES: GAD7 TOTAL SCORE: 0

## 2020-03-01 ENCOUNTER — HEALTH MAINTENANCE LETTER (OUTPATIENT)
Age: 30
End: 2020-03-01

## 2020-05-28 ENCOUNTER — TELEPHONE (OUTPATIENT)
Dept: OBGYN | Facility: CLINIC | Age: 30
End: 2020-05-28

## 2020-05-28 ENCOUNTER — HOSPITAL ENCOUNTER (EMERGENCY)
Facility: CLINIC | Age: 30
Discharge: HOME | End: 2020-05-28
Attending: FAMILY MEDICINE | Admitting: FAMILY MEDICINE
Payer: COMMERCIAL

## 2020-05-28 ENCOUNTER — APPOINTMENT (OUTPATIENT)
Dept: ULTRASOUND IMAGING | Facility: CLINIC | Age: 30
End: 2020-05-28
Attending: FAMILY MEDICINE
Payer: COMMERCIAL

## 2020-05-28 VITALS
RESPIRATION RATE: 18 BRPM | OXYGEN SATURATION: 99 % | HEART RATE: 70 BPM | DIASTOLIC BLOOD PRESSURE: 84 MMHG | BODY MASS INDEX: 25.76 KG/M2 | SYSTOLIC BLOOD PRESSURE: 120 MMHG | HEIGHT: 62 IN | WEIGHT: 140 LBS | TEMPERATURE: 98.2 F

## 2020-05-28 DIAGNOSIS — O36.80X0 PREGNANCY OF UNKNOWN ANATOMIC LOCATION: Primary | ICD-10-CM

## 2020-05-28 DIAGNOSIS — R10.31 RLQ ABDOMINAL PAIN: ICD-10-CM

## 2020-05-28 DIAGNOSIS — Z33.1 PREGNANT STATE, INCIDENTAL: ICD-10-CM

## 2020-05-28 LAB
ALBUMIN SERPL-MCNC: 4.1 G/DL (ref 3.4–5)
ALBUMIN UR-MCNC: NEGATIVE MG/DL
ALP SERPL-CCNC: 50 U/L (ref 40–150)
ALT SERPL W P-5'-P-CCNC: 20 U/L (ref 0–50)
ANION GAP SERPL CALCULATED.3IONS-SCNC: 2 MMOL/L (ref 3–14)
APPEARANCE UR: CLEAR
AST SERPL W P-5'-P-CCNC: 13 U/L (ref 0–45)
B-HCG SERPL-ACNC: 1190 IU/L (ref 0–5)
BASOPHILS # BLD AUTO: 0 10E9/L (ref 0–0.2)
BASOPHILS NFR BLD AUTO: 0.6 %
BILIRUB SERPL-MCNC: 0.2 MG/DL (ref 0.2–1.3)
BILIRUB UR QL STRIP: NEGATIVE
BUN SERPL-MCNC: 11 MG/DL (ref 7–30)
CALCIUM SERPL-MCNC: 8.4 MG/DL (ref 8.5–10.1)
CHLORIDE SERPL-SCNC: 109 MMOL/L (ref 94–109)
CO2 SERPL-SCNC: 28 MMOL/L (ref 20–32)
COLOR UR AUTO: YELLOW
CREAT SERPL-MCNC: 0.62 MG/DL (ref 0.52–1.04)
DIFFERENTIAL METHOD BLD: NORMAL
EOSINOPHIL # BLD AUTO: 0.1 10E9/L (ref 0–0.7)
EOSINOPHIL NFR BLD AUTO: 1.3 %
ERYTHROCYTE [DISTWIDTH] IN BLOOD BY AUTOMATED COUNT: 11.8 % (ref 10–15)
GFR SERPL CREATININE-BSD FRML MDRD: >90 ML/MIN/{1.73_M2}
GLUCOSE SERPL-MCNC: 88 MG/DL (ref 70–99)
GLUCOSE UR STRIP-MCNC: NEGATIVE MG/DL
HCG UR QL: POSITIVE
HCT VFR BLD AUTO: 41 % (ref 35–47)
HGB BLD-MCNC: 14 G/DL (ref 11.7–15.7)
HGB UR QL STRIP: NEGATIVE
IMM GRANULOCYTES # BLD: 0 10E9/L (ref 0–0.4)
IMM GRANULOCYTES NFR BLD: 0.1 %
KETONES UR STRIP-MCNC: 5 MG/DL
LEUKOCYTE ESTERASE UR QL STRIP: NEGATIVE
LIPASE SERPL-CCNC: 135 U/L (ref 73–393)
LYMPHOCYTES # BLD AUTO: 1.6 10E9/L (ref 0.8–5.3)
LYMPHOCYTES NFR BLD AUTO: 22.2 %
MCH RBC QN AUTO: 31.3 PG (ref 26.5–33)
MCHC RBC AUTO-ENTMCNC: 34.1 G/DL (ref 31.5–36.5)
MCV RBC AUTO: 92 FL (ref 78–100)
MONOCYTES # BLD AUTO: 0.4 10E9/L (ref 0–1.3)
MONOCYTES NFR BLD AUTO: 5.3 %
NEUTROPHILS # BLD AUTO: 5 10E9/L (ref 1.6–8.3)
NEUTROPHILS NFR BLD AUTO: 70.5 %
NITRATE UR QL: NEGATIVE
NRBC # BLD AUTO: 0 10*3/UL
NRBC BLD AUTO-RTO: 0 /100
PH UR STRIP: 6 PH (ref 5–7)
PLATELET # BLD AUTO: 279 10E9/L (ref 150–450)
POTASSIUM SERPL-SCNC: 3.9 MMOL/L (ref 3.4–5.3)
PROT SERPL-MCNC: 7.4 G/DL (ref 6.8–8.8)
RBC # BLD AUTO: 4.47 10E12/L (ref 3.8–5.2)
SODIUM SERPL-SCNC: 139 MMOL/L (ref 133–144)
SOURCE: ABNORMAL
SP GR UR STRIP: 1.02 (ref 1–1.03)
UROBILINOGEN UR STRIP-MCNC: 0 MG/DL (ref 0–2)
WBC # BLD AUTO: 7 10E9/L (ref 4–11)

## 2020-05-28 PROCEDURE — 84702 CHORIONIC GONADOTROPIN TEST: CPT | Performed by: FAMILY MEDICINE

## 2020-05-28 PROCEDURE — 99284 EMERGENCY DEPT VISIT MOD MDM: CPT | Mod: 25 | Performed by: FAMILY MEDICINE

## 2020-05-28 PROCEDURE — 85025 COMPLETE CBC W/AUTO DIFF WBC: CPT | Performed by: FAMILY MEDICINE

## 2020-05-28 PROCEDURE — 81025 URINE PREGNANCY TEST: CPT | Performed by: FAMILY MEDICINE

## 2020-05-28 PROCEDURE — 83690 ASSAY OF LIPASE: CPT | Performed by: FAMILY MEDICINE

## 2020-05-28 PROCEDURE — 81003 URINALYSIS AUTO W/O SCOPE: CPT | Performed by: FAMILY MEDICINE

## 2020-05-28 PROCEDURE — 76817 TRANSVAGINAL US OBSTETRIC: CPT

## 2020-05-28 PROCEDURE — 80053 COMPREHEN METABOLIC PANEL: CPT | Performed by: FAMILY MEDICINE

## 2020-05-28 PROCEDURE — 99285 EMERGENCY DEPT VISIT HI MDM: CPT | Mod: Z6 | Performed by: FAMILY MEDICINE

## 2020-05-28 PROCEDURE — 93976 VASCULAR STUDY: CPT

## 2020-05-28 ASSESSMENT — MIFFLIN-ST. JEOR: SCORE: 1298.29

## 2020-05-28 NOTE — ED PROVIDER NOTES
HPI   The patient is a 30-year-old female presenting with right lower quadrant abdominal pain.  She recently delivered her second child.  She has had 2 months of menstruation as she returns to normal patterns.  She also describes chronic loose stool which has had a negative work-up in the recent past.  No abdominal surgery reported.    The patient had onset of discomfort starting last week.  The pain is been constant but will wax and wane in severity.  It does become severe at times.  It aches currently rated mild to moderate.  She tells me standing for long periods of time and walking seems to exacerbate her pain.  No radiating symptoms are described.  No vaginal discharge or irritation out of the ordinary.  No vaginal bleeding out of the ordinary.  No changes in bowels.  No nausea or vomiting.  No fever.  No trauma or injury.  No skin rash.        Allergies:  Allergies   Allergen Reactions     Tylenol W/Codeine [Acetaminophen-Codeine] Other (See Comments)     Causes lower back pain     Problem List:    Patient Active Problem List    Diagnosis Date Noted     Other depression 03/19/2019     Priority: Medium      Past Medical History:    Past Medical History:   Diagnosis Date     Carpal tunnel syndrome      Chickenpox      Depression      Depressive disorder      Past Surgical History:    Past Surgical History:   Procedure Laterality Date     AS CREATE EARDRUM OPENING,GEN ANESTH       MOUTH SURGERY      wisdom teeth     Family History:    Family History   Problem Relation Age of Onset     Mental Illness Mother      Depression Mother      Hypertension Father      Coronary Artery Disease Maternal Grandmother         MI x3     Diabetes Maternal Grandfather      Cancer Paternal Grandmother      Cancer Paternal Grandfather      Social History:  Marital Status:   [2]  Social History     Tobacco Use     Smoking status: Former Smoker     Smokeless tobacco: Never Used     Tobacco comment: quit 5-6 year ago  "  Substance Use Topics     Alcohol use: Yes     Comment: 3-4 weekly- quit with pregnancy     Drug use: Yes     Types: Marijuana     Comment: stopped with pregnancy      Medications:    escitalopram (LEXAPRO) 10 MG tablet  Prenatal Vit-Fe Fumarate-FA (PRENATAL MULTIVITAMIN PLUS IRON) 27-0.8 MG TABS per tablet      Review of Systems   All other systems reviewed and are negative.      PE   BP: 133/84  Pulse: 91  Temp: 98.2  F (36.8  C)  Resp: 18  Height: 157.5 cm (5' 2\")  Weight: 63.5 kg (140 lb)  SpO2: 99 %  Physical Exam  Vitals signs reviewed.   Constitutional:       General: She is in acute distress.      Appearance: She is well-developed.      Comments: Mild discomfort obvious.   HENT:      Head: Normocephalic and atraumatic.   Eyes:      Conjunctiva/sclera: Conjunctivae normal.   Neck:      Musculoskeletal: Normal range of motion.   Cardiovascular:      Rate and Rhythm: Normal rate and regular rhythm.   Pulmonary:      Effort: Pulmonary effort is normal.   Abdominal:      Palpations: Abdomen is soft.      Tenderness: There is abdominal tenderness.      Comments: Tender with deep palpation in the right lower quadrant.  No guarding.  Soft throughout.  No organomegaly or mass.  No distention.   Musculoskeletal: Normal range of motion.   Skin:     General: Skin is warm and dry.   Neurological:      Mental Status: She is alert and oriented to person, place, and time.   Psychiatric:         Behavior: Behavior normal.         ED COURSE and MDM   1445.  The patient has right lower quadrant pain and tenderness.  Lab values pending.  Imaging will follow.  She refuses medication for analgesia at this time.    1755.  The patient's results are reviewed with her.  I also reviewed these results with the OB/Gyn on-call.  The physician would like to reach out to the patient by phone and talk to her about potential options.  Ectopic pregnancy is certainly a concern.  We could also be dealing with a pregnancy that is too early to " see within the uterus.  She will call me after their conversation.    1832.  Dr. Lomeli was able to talk to the patient and reviewed the lab and imaging results with her once again.  There is some concern that there is an ectopic pregnancy but there is also some hope that this could be an intrauterine pregnancy too early to detect by ultrasound.  As result, the patient is asking to have her quantitative hCG rechecked in 48 hours and follow her symptoms during that time.  She will return here to the ER/lab and have her blood redrawn on Saturday.  The test was reportedly ordered by the OB doctor I spoke with today.  The patient will let the ER desk know to reach out to the on-call OB physician on Saturday and let them know that her blood is being drawn and that they need to look for the results.  They will then have a phone call conversation with her about a plan of action at that time.  If needed, the patient will come to the ER for further evaluation and management.  She will return here earlier if symptoms worsen as discussed.    LABS  Labs Ordered and Resulted from Time of ED Arrival Up to the Time of Departure from the ED   URINE MACROSCOPIC WITH REFLEX TO MICRO - Abnormal; Notable for the following components:       Result Value    Ketones Urine 5 (*)     All other components within normal limits   HCG QUALITATIVE URINE - Abnormal; Notable for the following components:    HCG Qual Urine Positive (*)     All other components within normal limits   COMPREHENSIVE METABOLIC PANEL - Abnormal; Notable for the following components:    Anion Gap 2 (*)     Calcium 8.4 (*)     All other components within normal limits   HCG QUANTITATIVE PREGNANCY - Abnormal; Notable for the following components:    HCG Quantitative Serum 1,190 (*)     All other components within normal limits   CBC WITH PLATELETS DIFFERENTIAL   LIPASE   MAY SALINE LOCK IV       IMAGING  Images reviewed by me.  Radiology report also reviewed.  US OB  <14 Weeks w Transvaginal Single   Final Result   IMPRESSION:   1. No intrauterine gestational sac, yolk sac or fetal pole visualized.   This could be due to early gestation versus ectopic pregnancy.   2. There is 2.5 cm heterogenous hypoechoic area in the right adnexa   with a predominantly peripheral flow, could represent corpus luteal   cyst versus ectopic pregnancy.   3. The endometrial stripe is mildly thickened measuring 1.7 cm.      LADI DE DIOS MD          Procedures    Medications - No data to display      IMPRESSION       ICD-10-CM    1. Pregnant state, incidental  Z33.1    2. RLQ abdominal pain  R10.31             Medication List      There are no discharge medications for this visit.                       Nick Da Silva MD  05/28/20 9044

## 2020-05-28 NOTE — PROGRESS NOTES
OBGYN Consult note:    Lanie Martin is a 30 year old  who presents emergency department with right lower quadrant pain.  The patient states that this pain has been ongoing for about 1 week.  She describes the pain is waxing and waning in intensity.  She denies nausea or vomiting.  No chest pain, shortness of breath, dizziness or lightheadedness.  Patient is in the several months.  She states that she stopped breast-feeding a few months ago and has had 2 periods since that time.  Reviewed with the patient that her hCG level is positive and resulted at approximately 1100.  She also has ultrasound that does not show clear evidence of pregnancy inside the uterus with 2.5 centimeters cyst on the right ovary.  Reviewed with the patient that at this time highest concern is for ectopic but that early IUP cannot be completely ruled out as the cyst could also represent a corpus luteum. No free fluid present and patient hemodynamically stable with HGB of 14.  During our conversation our patient appears quite comfortable and is laughing and smiling throughout.  She states that her pain is not severe at this time.  The patient expresses that she very strongly desires pregnancy and would not want to intervene in the setting of potential for early IUP.  Discussed with the patient briefly options for managing ectopic pregnancy should be determined that this is the case.  Reviewed option of surgery which would be definitive management.  Also reviewed methotrexate therapy for which patient appears to be a reasonable candidate with low hCG level, no heart rate seen in the adnexa and normal baseline labs and she is no longer breast-feeding.  Given the high suspicion for possible ectopic pregnancy did offer patient methotrexate therapy today.  Given her strong trust in pregnancy and speculum IUP with sounds like minimal pain at this time did also offer repeat hCG level in 48 hours to confirm whether this is rising  appropriately or not.  The patient would like to pursue this option.  Did also discuss with the patient the possibility that in the interval time if an ectopic pregnancy is present and ruptures she may need preclude herself from being a methotrexate candidate and need surgery. The patient states understanding.  Did review with the patient at length indications for return sooner than 48 hours including worsening of pain, dizziness, lightheadedness, chest pain, shortness of breath.  She states understanding and will have a low threshold to return should the symptoms develop.     Plan for patient to return in 48 hours for repeat hCG level.  We will plan to discuss this patient case with Dr. Cool who is on-call on that date so that he can probably relatable and facilitate treatment with methotrexate if indicated.    All patient's questions were answered and she is in agreement with the plan of care. Did also review plan of care with the ED provider who is also in agreement.      Consult completed with video for duration of approximately 20 minutes.    Flaca Lima MD   5/28/2020 6:38 PM

## 2020-05-28 NOTE — TELEPHONE ENCOUNTER
S-(situation): Pt reports that she is experiencing RLQ pain for a week now that is a constant dull pain.  Heat seems to help a little but the Tylenol/Ibuprofen is not helping with the pain.  She rates her pain a 5-6/10, this pain does wake her up in the middle of the night a couple of times.  She denies having a fever, nausea or vomiting. No problems with eating, she denies burning or frequency with urination but admits to having urgency and this is new.  Urine is normal colored without an odor. She is having regular bm's that have been a soft consistency.  Her last menstrual period was on 04-24-20, she is not on any birth control and mentions that they are trying for pregnancy.    B-(background): Pt Talked to provider at [facility] in [place] whom advised that she contact her ob/gyn clinic. This is a clinic through her husbands work.  Last ov here in ob/gyn 03-19-19.    A-(assessment): Questionable UTI, appendicitis or possible ovarian cyst or tubal pregnancy.    R-(recommendations): Pt was advised to schedule an office visit with a provider in FP for further evaluation.    Pt agrees with this plan.    Stefania Nunez  [facility] RN

## 2020-05-28 NOTE — ED AVS SNAPSHOT
[facility]  [address] [address]  Phone:  [phone]  Fax:  [phone]                                    Lanie Martin   MRN: [id number]    Department:  [facility]   Date of Visit:  5/28/2020           After Visit Summary Signature Page    I have received my discharge instructions, and my questions have been answered. I have discussed any challenges I see with this plan with the nurse or doctor.    ..........................................................................................................................................  Patient/Patient Representative Signature      ..........................................................................................................................................  Patient Representative Print Name and Relationship to Patient    ..................................................               ................................................  Date                                   Time    ..........................................................................................................................................  Reviewed by Signature/Title    ...................................................              ..............................................  Date                                               Time          22EPIC Rev 08/18

## 2020-05-28 NOTE — TELEPHONE ENCOUNTER
RN asked to triage patient on C. Kati schedule today for possible ectopic pregnancy from triage note below.  Per provider, if RLQ pain then needs ER evaluation.  Patient reports yes, RLQ pain, LMP 4-24-20 and she is trying to have a baby.  RN advised ER.  Patient agrees with plan and verbalized understanding.    Yara GÓMEZ RN BSN

## 2020-05-28 NOTE — DISCHARGE INSTRUCTIONS
Return to the Emergency Room if the following occurs:     Severely worsened pain, fainting, fever >101, or for any concern at anytime.    Or, follow-up with the following provider as we discussed:     Return on Saturday for a repeat quantitative hCG blood test.  This test will be sent to the on-call OB doctor.  Please tell the ER charge nurse to make sure that the on-call OB doctor has been notified that you came in and that the test has been drawn and is being evaluated.    Medications discussed:    Tylenol for comfort, 1000 mg every six hours as needed.    If you received pain-relieving or sedating medication during your time in the ER, avoid alcohol, driving automobiles, or working with machinery.  Also, a responsible adult must stay with you.        Call the Nurse Advice Line at [phone] or [phone] for any concern at anytime.

## 2020-05-28 NOTE — TELEPHONE ENCOUNTER
Reason for call:  Patient reporting a symptom    Symptom or request: Pt calling having pain in lower right side by ovary.  Spoke to PCP and was told to call GYN.  Tylenol ibuprofen heat packs - not helping any more.    Duration (how long have symptoms been present): about a week    Have you been treated for this before? Had similar pain many many years ago when she had IUD.     Additional comments: Pt wondering if this can wait until an appt?    Phone Number patient can be reached at:  Home number on file [phone] (home)    Best Time:      Can we leave a detailed message on this number:  YES    Call taken on 5/28/2020 at 10:11 AM by Rosibel Thomas

## 2020-05-30 ENCOUNTER — HOSPITAL ENCOUNTER (EMERGENCY)
Facility: CLINIC | Age: 30
End: 2020-05-30
Attending: OBSTETRICS & GYNECOLOGY
Payer: COMMERCIAL

## 2020-05-30 ENCOUNTER — HOSPITAL ENCOUNTER (OUTPATIENT)
Dept: LAB | Facility: CLINIC | Age: 30
Discharge: HOME | End: 2020-05-30
Attending: OBSTETRICS & GYNECOLOGY | Admitting: OBSTETRICS & GYNECOLOGY
Payer: COMMERCIAL

## 2020-05-30 DIAGNOSIS — O36.80X0 PREGNANCY OF UNKNOWN ANATOMIC LOCATION: ICD-10-CM

## 2020-05-30 DIAGNOSIS — O36.80X0 PREGNANCY OF UNKNOWN ANATOMIC LOCATION: Primary | ICD-10-CM

## 2020-05-30 LAB — B-HCG SERPL-ACNC: 2525 IU/L (ref 0–5)

## 2020-05-30 PROCEDURE — 84702 CHORIONIC GONADOTROPIN TEST: CPT | Performed by: OBSTETRICS & GYNECOLOGY

## 2020-06-03 ENCOUNTER — HOSPITAL ENCOUNTER (OUTPATIENT)
Dept: ULTRASOUND IMAGING | Facility: CLINIC | Age: 30
Discharge: HOME | End: 2020-06-03
Attending: OBSTETRICS & GYNECOLOGY | Admitting: OBSTETRICS & GYNECOLOGY
Payer: COMMERCIAL

## 2020-06-03 DIAGNOSIS — O36.80X0 PREGNANCY OF UNKNOWN ANATOMIC LOCATION: ICD-10-CM

## 2020-06-03 PROCEDURE — 76817 TRANSVAGINAL US OBSTETRIC: CPT

## 2020-06-09 ENCOUNTER — APPOINTMENT (OUTPATIENT)
Dept: OBGYN | Facility: CLINIC | Age: 30
End: 2020-06-09
Payer: COMMERCIAL

## 2020-06-09 ENCOUNTER — PRENATAL OFFICE VISIT (OUTPATIENT)
Dept: OBGYN | Facility: CLINIC | Age: 30
End: 2020-06-09

## 2020-06-09 DIAGNOSIS — Z34.80 PRENATAL CARE, SUBSEQUENT PREGNANCY: Primary | ICD-10-CM

## 2020-06-09 PROCEDURE — 99207 ZZC NO CHARGE NURSE ONLY: CPT | Performed by: OBSTETRICS & GYNECOLOGY

## 2020-06-18 ENCOUNTER — PRENATAL OFFICE VISIT (OUTPATIENT)
Dept: OBGYN | Facility: CLINIC | Age: 30
End: 2020-06-18
Payer: COMMERCIAL

## 2020-06-18 VITALS
RESPIRATION RATE: 16 BRPM | TEMPERATURE: 97.6 F | SYSTOLIC BLOOD PRESSURE: 101 MMHG | BODY MASS INDEX: 26.48 KG/M2 | DIASTOLIC BLOOD PRESSURE: 70 MMHG | HEIGHT: 62 IN | HEART RATE: 78 BPM | WEIGHT: 143.9 LBS

## 2020-06-18 DIAGNOSIS — Z34.81 PRENATAL CARE, SUBSEQUENT PREGNANCY IN FIRST TRIMESTER: Primary | ICD-10-CM

## 2020-06-18 LAB
ABO + RH BLD: NORMAL
ABO + RH BLD: NORMAL
ALBUMIN UR-MCNC: NEGATIVE MG/DL
APPEARANCE UR: CLEAR
BILIRUB UR QL STRIP: NEGATIVE
BLD GP AB SCN SERPL QL: NORMAL
BLOOD BANK CMNT PATIENT-IMP: NORMAL
BLOOD BANK CMNT PATIENT-IMP: NORMAL
COLOR UR AUTO: YELLOW
ERYTHROCYTE [DISTWIDTH] IN BLOOD BY AUTOMATED COUNT: 11.6 % (ref 10–15)
GLUCOSE UR STRIP-MCNC: NEGATIVE MG/DL
HCT VFR BLD AUTO: 38.2 % (ref 35–47)
HGB BLD-MCNC: 13.2 G/DL (ref 11.7–15.7)
HGB UR QL STRIP: NEGATIVE
KETONES UR STRIP-MCNC: NEGATIVE MG/DL
LEUKOCYTE ESTERASE UR QL STRIP: NEGATIVE
MCH RBC QN AUTO: 31.4 PG (ref 26.5–33)
MCHC RBC AUTO-ENTMCNC: 34.6 G/DL (ref 31.5–36.5)
MCV RBC AUTO: 91 FL (ref 78–100)
NITRATE UR QL: NEGATIVE
PH UR STRIP: 6 PH (ref 5–7)
PLATELET # BLD AUTO: 279 10E9/L (ref 150–450)
RBC # BLD AUTO: 4.2 10E12/L (ref 3.8–5.2)
SOURCE: NORMAL
SP GR UR STRIP: 1.02 (ref 1–1.03)
SPECIMEN EXP DATE BLD: NORMAL
UROBILINOGEN UR STRIP-ACNC: 0.2 EU/DL (ref 0.2–1)
WBC # BLD AUTO: 8.7 10E9/L (ref 4–11)

## 2020-06-18 PROCEDURE — 86850 RBC ANTIBODY SCREEN: CPT | Performed by: OBSTETRICS & GYNECOLOGY

## 2020-06-18 PROCEDURE — 99207 ZZC FIRST OB VISIT: CPT | Performed by: OBSTETRICS & GYNECOLOGY

## 2020-06-18 PROCEDURE — 86762 RUBELLA ANTIBODY: CPT | Performed by: OBSTETRICS & GYNECOLOGY

## 2020-06-18 PROCEDURE — 87591 N.GONORRHOEAE DNA AMP PROB: CPT | Performed by: OBSTETRICS & GYNECOLOGY

## 2020-06-18 PROCEDURE — 87086 URINE CULTURE/COLONY COUNT: CPT | Performed by: OBSTETRICS & GYNECOLOGY

## 2020-06-18 PROCEDURE — 87491 CHLMYD TRACH DNA AMP PROBE: CPT | Performed by: OBSTETRICS & GYNECOLOGY

## 2020-06-18 PROCEDURE — 36415 COLL VENOUS BLD VENIPUNCTURE: CPT | Performed by: OBSTETRICS & GYNECOLOGY

## 2020-06-18 PROCEDURE — 81003 URINALYSIS AUTO W/O SCOPE: CPT | Performed by: OBSTETRICS & GYNECOLOGY

## 2020-06-18 PROCEDURE — 85027 COMPLETE CBC AUTOMATED: CPT | Performed by: OBSTETRICS & GYNECOLOGY

## 2020-06-18 PROCEDURE — 76817 TRANSVAGINAL US OBSTETRIC: CPT | Performed by: OBSTETRICS & GYNECOLOGY

## 2020-06-18 PROCEDURE — 86780 TREPONEMA PALLIDUM: CPT | Performed by: OBSTETRICS & GYNECOLOGY

## 2020-06-18 PROCEDURE — 87389 HIV-1 AG W/HIV-1&-2 AB AG IA: CPT | Performed by: OBSTETRICS & GYNECOLOGY

## 2020-06-18 PROCEDURE — 86900 BLOOD TYPING SEROLOGIC ABO: CPT | Performed by: OBSTETRICS & GYNECOLOGY

## 2020-06-18 PROCEDURE — 87340 HEPATITIS B SURFACE AG IA: CPT | Performed by: OBSTETRICS & GYNECOLOGY

## 2020-06-18 PROCEDURE — 86901 BLOOD TYPING SEROLOGIC RH(D): CPT | Performed by: OBSTETRICS & GYNECOLOGY

## 2020-06-18 ASSESSMENT — MIFFLIN-ST. JEOR: SCORE: 1315.98

## 2020-06-18 NOTE — NURSING NOTE
"Initial /70 (BP Location: Right arm, Patient Position: Chair, Cuff Size: Adult Regular)   Pulse 78   Temp 97.6  F (36.4  C) (Tympanic)   Resp 16   Ht 1.575 m (5' 2\")   Wt 65.3 kg (143 lb 14.4 oz)   LMP 04/24/2020   Breastfeeding No   BMI 26.32 kg/m   Estimated body mass index is 26.32 kg/m  as calculated from the following:    Height as of this encounter: 1.575 m (5' 2\").    Weight as of this encounter: 65.3 kg (143 lb 14.4 oz). .    Anjana Tapia, KEI    "

## 2020-06-18 NOTE — PROGRESS NOTES
"Lanie is a 30 year old  @ 7.6 weeks here for new ob visit.  Planned prgenancy      ROS: Ten point review of systems was reviewed and negative except the above.  Current Issues include: nausea, mild  fatigue    OBhx:  x 2  Gyne: Pap smears Normal  history of STD No STD history  Past Medical History:   Diagnosis Date     Carpal tunnel syndrome     during pregnancy     Chickenpox      Depression      Past Surgical History:   Procedure Laterality Date     AS CREATE EARDRUM OPENING,GEN ANESTH       MOUTH SURGERY      wisdom teeth     Patient Active Problem List    Diagnosis Date Noted     Other depression 2019     Priority: Medium     Prenatal care, subsequent pregnancy 2018     Priority: Medium        Allergies   Allergen Reactions     Tylenol W/Codeine [Acetaminophen-Codeine] Other (See Comments)     Causes lower back pain     escitalopram (LEXAPRO) 10 MG tablet, Take 1.5 tablets (15 mg) by mouth daily  Omega-3 Fatty Acids (FISH OIL PO),   Prenatal Vit-Fe Fumarate-FA (PRENATAL MULTIVITAMIN PLUS IRON) 27-0.8 MG TABS per tablet, Take 1 tablet by mouth daily    No current facility-administered medications on file prior to visit.     FH: Her family history was reviewed and documented in its appropriate chart area.    Past Medical History of Father of Baby:   No significant medical history    Physical Exam: /70 (BP Location: Right arm, Patient Position: Chair, Cuff Size: Adult Regular)   Pulse 78   Temp 97.6  F (36.4  C) (Tympanic)   Resp 16   Ht 1.575 m (5' 2\")   Wt 65.3 kg (143 lb 14.4 oz)   LMP 2020   Breastfeeding No   BMI 26.32 kg/m    General: Well developed, well nourished female  Skin: Normal  HEENT: Normal  Neck: Supple,no adenopathy,thyroid normal  Chest: Clear  Heart: Regular rate, rhythm  Breasts: Not examined   Abdomen: Benign,Soft, flat, non-tender,No masses, organomegaly,No inguinal nodes,Bowel sounds normoactive   Extremities: Normal  Neurological: Normal "   Perineum: Normal   Vulva: Normal     Transvaginal ultrasound was performed.  A viable intrauterine pregnancy was seen.  CRL consistent with 7 weeks, 3 days.  Fetal heart motion was visualized.    EDC by LMP: 2021   EDC by sono:  2021  Final EDC: 2021    A/P 30 year old  at  7.6 weeks    1. Discussed physician coverage, tertiary support, diet, exercise, weight gain, schedule of visits, routine and indicated ultrasounds, and childbirth education.    2. Options for  testing for chromosomal and birth defects were discussed with the patient including nuchal lucency/blood marker testing in the first trimester and quad screening and/or Level 2 ultrasound in the second trimester.  We discussed that these are screening tests and not diagnostic tests and that false positives and negatives are a distinct possibility.  We discussed that follow up diagnostic testing would include chorionic villus sampling or amniocentesis depending on gestational age.    3. Prenatal labs, GC, Chlamydia    4. Prenatal Vitamins    Annabel Clarke M.D.

## 2020-06-19 LAB
BACTERIA SPEC CULT: NO GROWTH
C TRACH DNA SPEC QL NAA+PROBE: NEGATIVE
HBV SURFACE AG SERPL QL IA: NONREACTIVE
HIV 1+2 AB+HIV1 P24 AG SERPL QL IA: NONREACTIVE
Lab: NORMAL
N GONORRHOEA DNA SPEC QL NAA+PROBE: NEGATIVE
RUBV IGG SERPL IA-ACNC: 16 IU/ML
SPECIMEN SOURCE: NORMAL
T PALLIDUM AB SER QL: NONREACTIVE

## 2020-06-22 DIAGNOSIS — O21.9 NAUSEA/VOMITING IN PREGNANCY: Primary | ICD-10-CM

## 2020-06-22 RX ORDER — ONDANSETRON 4 MG/1
4 TABLET, ORALLY DISINTEGRATING ORAL EVERY 8 HOURS PRN
Qty: 15 TABLET | Refills: 0 | Status: SHIPPED | OUTPATIENT
Start: 2020-06-22 | End: 2020-10-26

## 2020-06-22 RX ORDER — PYRIDOXINE HCL (VITAMIN B6) 25 MG
25 TABLET ORAL 3 TIMES DAILY
Qty: 90 TABLET | Refills: 1 | Status: ON HOLD | OUTPATIENT
Start: 2020-06-22 | End: 2021-01-23

## 2020-07-16 ENCOUNTER — PRENATAL OFFICE VISIT (OUTPATIENT)
Dept: OBGYN | Facility: CLINIC | Age: 30
End: 2020-07-16
Payer: COMMERCIAL

## 2020-07-16 VITALS
SYSTOLIC BLOOD PRESSURE: 107 MMHG | HEART RATE: 71 BPM | BODY MASS INDEX: 25.25 KG/M2 | WEIGHT: 137.2 LBS | TEMPERATURE: 99 F | HEIGHT: 62 IN | RESPIRATION RATE: 16 BRPM | DIASTOLIC BLOOD PRESSURE: 62 MMHG

## 2020-07-16 DIAGNOSIS — Z34.81 PRENATAL CARE, SUBSEQUENT PREGNANCY IN FIRST TRIMESTER: Primary | ICD-10-CM

## 2020-07-16 PROCEDURE — 99207 ZZC PRENATAL VISIT: CPT | Performed by: OBSTETRICS & GYNECOLOGY

## 2020-07-16 ASSESSMENT — MIFFLIN-ST. JEOR: SCORE: 1285.59

## 2020-07-16 NOTE — NURSING NOTE
"Initial /62 (BP Location: Right arm, Patient Position: Chair, Cuff Size: Adult Regular)   Pulse 71   Temp 99  F (37.2  C) (Tympanic)   Resp 16   Ht 1.575 m (5' 2\")   Wt 62.2 kg (137 lb 3.2 oz)   LMP 04/24/2020   Breastfeeding No   BMI 25.09 kg/m   Estimated body mass index is 25.09 kg/m  as calculated from the following:    Height as of this encounter: 1.575 m (5' 2\").    Weight as of this encounter: 62.2 kg (137 lb 3.2 oz). .    Anjana Tapia, KEI    "

## 2020-07-16 NOTE — PROGRESS NOTES
"CC: Here for routine prenatal visit @ 11w6d   HPI: no cramping or bleeding.  No complaints.     PE: /62 (BP Location: Right arm, Patient Position: Chair, Cuff Size: Adult Regular)   Pulse 71   Temp 99  F (37.2  C) (Tympanic)   Resp 16   Ht 1.575 m (5' 2\")   Wt 62.2 kg (137 lb 3.2 oz)   LMP 2020   Breastfeeding No   BMI 25.09 kg/m     See OB flowsheet    Labs WNL    A/P  @ 11w6d normal pregnancy    1. Routine prenatal care.  Anomaly screen ordered for 20 weeks.  Declines genetic screening.     RTC 4 weeks.      Annabel Clarke M.D.    "

## 2020-08-10 ENCOUNTER — PRENATAL OFFICE VISIT (OUTPATIENT)
Dept: OBGYN | Facility: CLINIC | Age: 30
End: 2020-08-10
Payer: COMMERCIAL

## 2020-08-10 VITALS
BODY MASS INDEX: 25.47 KG/M2 | DIASTOLIC BLOOD PRESSURE: 71 MMHG | HEART RATE: 90 BPM | WEIGHT: 138.4 LBS | TEMPERATURE: 98.3 F | HEIGHT: 62 IN | SYSTOLIC BLOOD PRESSURE: 103 MMHG | RESPIRATION RATE: 18 BRPM

## 2020-08-10 DIAGNOSIS — Z34.82 PRENATAL CARE, SUBSEQUENT PREGNANCY IN SECOND TRIMESTER: Primary | ICD-10-CM

## 2020-08-10 PROCEDURE — 99207 ZZC PRENATAL VISIT: CPT | Performed by: OBSTETRICS & GYNECOLOGY

## 2020-08-10 ASSESSMENT — MIFFLIN-ST. JEOR: SCORE: 1291.03

## 2020-08-10 NOTE — PROGRESS NOTES
"CC: Here for routine prenatal visit @ 15w3d   HPI:  Feeling well; appetite starting to improve; declines  screening for Downs' syndrome    PE: /71 (BP Location: Right arm, Patient Position: Chair, Cuff Size: Adult Regular)   Pulse 90   Temp 98.3  F (36.8  C) (Tympanic)   Resp 18   Ht 1.575 m (5' 2\")   Wt 62.8 kg (138 lb 6.4 oz)   LMP 2020   BMI 25.31 kg/m     See OB flowsheet      A:  1. Prenatal care, subsequent pregnancy in second trimester        Routine prenatal care  RTC 4 weeks.      Rand Calle M.D.     "

## 2020-09-08 ENCOUNTER — PRENATAL OFFICE VISIT (OUTPATIENT)
Dept: OBGYN | Facility: CLINIC | Age: 30
End: 2020-09-08
Payer: COMMERCIAL

## 2020-09-08 VITALS
HEIGHT: 62 IN | RESPIRATION RATE: 18 BRPM | BODY MASS INDEX: 26.31 KG/M2 | DIASTOLIC BLOOD PRESSURE: 65 MMHG | HEART RATE: 72 BPM | SYSTOLIC BLOOD PRESSURE: 98 MMHG | WEIGHT: 143 LBS | TEMPERATURE: 97.8 F

## 2020-09-08 DIAGNOSIS — Z34.82 PRENATAL CARE, SUBSEQUENT PREGNANCY IN SECOND TRIMESTER: Primary | ICD-10-CM

## 2020-09-08 PROCEDURE — 99207 ZZC PRENATAL VISIT: CPT | Performed by: OBSTETRICS & GYNECOLOGY

## 2020-09-08 ASSESSMENT — MIFFLIN-ST. JEOR: SCORE: 1311.89

## 2020-09-08 NOTE — PROGRESS NOTES
"CC: Here for routine prenatal visit @ 19w4d   HPI: + FM, no ctx, no LOF, no VB.  No complaints.     PE: BP 98/65 (BP Location: Left arm, Patient Position: Chair, Cuff Size: Adult Regular)   Pulse 72   Temp 97.8  F (36.6  C) (Tympanic)   Resp 18   Ht 1.575 m (5' 2\")   Wt 64.9 kg (143 lb)   LMP 2020   BMI 26.16 kg/m     See OB flowsheet    U/S planned for next week    A/P  @ 19w4d normal pregnancy    1. Routine prenatal care.  COVID restrictions and recommendations reviewed including iron supplementation. Flu shot next visit.     RTC 4 weeks.      Annabel Clarke M.D.    "

## 2020-09-08 NOTE — NURSING NOTE
"Initial BP 98/65 (BP Location: Left arm, Patient Position: Chair, Cuff Size: Adult Regular)   Pulse 72   Temp 97.8  F (36.6  C) (Tympanic)   Resp 18   Ht 1.575 m (5' 2\")   Wt 64.9 kg (143 lb)   LMP 04/24/2020   BMI 26.16 kg/m   Estimated body mass index is 26.16 kg/m  as calculated from the following:    Height as of this encounter: 1.575 m (5' 2\").    Weight as of this encounter: 64.9 kg (143 lb). .      "

## 2020-09-14 ENCOUNTER — HOSPITAL ENCOUNTER (OUTPATIENT)
Dept: ULTRASOUND IMAGING | Facility: CLINIC | Age: 30
Discharge: HOME | End: 2020-09-14
Attending: OBSTETRICS & GYNECOLOGY | Admitting: OBSTETRICS & GYNECOLOGY
Payer: COMMERCIAL

## 2020-09-14 DIAGNOSIS — Z34.81 PRENATAL CARE, SUBSEQUENT PREGNANCY IN FIRST TRIMESTER: ICD-10-CM

## 2020-09-14 PROCEDURE — 76805 OB US >/= 14 WKS SNGL FETUS: CPT

## 2020-10-12 ENCOUNTER — PRENATAL OFFICE VISIT (OUTPATIENT)
Dept: OBGYN | Facility: CLINIC | Age: 30
End: 2020-10-12
Payer: COMMERCIAL

## 2020-10-12 VITALS
HEART RATE: 71 BPM | HEIGHT: 62 IN | SYSTOLIC BLOOD PRESSURE: 98 MMHG | WEIGHT: 149 LBS | DIASTOLIC BLOOD PRESSURE: 54 MMHG | RESPIRATION RATE: 18 BRPM | BODY MASS INDEX: 27.42 KG/M2 | TEMPERATURE: 98.5 F

## 2020-10-12 DIAGNOSIS — Z34.82 PRENATAL CARE, SUBSEQUENT PREGNANCY IN SECOND TRIMESTER: Primary | ICD-10-CM

## 2020-10-12 LAB
ERYTHROCYTE [DISTWIDTH] IN BLOOD BY AUTOMATED COUNT: 13 % (ref 10–15)
GLUCOSE 1H P 50 G GLC PO SERPL-MCNC: 100 MG/DL (ref 60–129)
HCT VFR BLD AUTO: 34.7 % (ref 35–47)
HGB BLD-MCNC: 11.6 G/DL (ref 11.7–15.7)
MCH RBC QN AUTO: 31 PG (ref 26.5–33)
MCHC RBC AUTO-ENTMCNC: 33.4 G/DL (ref 31.5–36.5)
MCV RBC AUTO: 93 FL (ref 78–100)
PLATELET # BLD AUTO: 282 10E9/L (ref 150–450)
RBC # BLD AUTO: 3.74 10E12/L (ref 3.8–5.2)
WBC # BLD AUTO: 10.5 10E9/L (ref 4–11)

## 2020-10-12 PROCEDURE — 85027 COMPLETE CBC AUTOMATED: CPT | Performed by: OBSTETRICS & GYNECOLOGY

## 2020-10-12 PROCEDURE — 99207 PR PRENATAL VISIT: CPT | Performed by: OBSTETRICS & GYNECOLOGY

## 2020-10-12 PROCEDURE — 86780 TREPONEMA PALLIDUM: CPT | Mod: 90 | Performed by: OBSTETRICS & GYNECOLOGY

## 2020-10-12 PROCEDURE — 99000 SPECIMEN HANDLING OFFICE-LAB: CPT | Performed by: OBSTETRICS & GYNECOLOGY

## 2020-10-12 PROCEDURE — 82950 GLUCOSE TEST: CPT | Performed by: OBSTETRICS & GYNECOLOGY

## 2020-10-12 ASSESSMENT — MIFFLIN-ST. JEOR: SCORE: 1339.11

## 2020-10-12 NOTE — PROGRESS NOTES
"CC: Here for routine prenatal visit @ 24w3d   HPI:  Discussed tubal sterilization; pt is quite inclined towards this option; feeling well; no complaints    PE: BP 98/54 (BP Location: Left arm, Patient Position: Chair, Cuff Size: Adult Regular)   Pulse 71   Temp 98.5  F (36.9  C) (Tympanic)   Resp 18   Ht 1.575 m (5' 2\")   Wt 67.6 kg (149 lb)   LMP 04/24/2020   BMI 27.25 kg/m     See OB flowsheet      A:  1. Prenatal care, subsequent pregnancy in second trimester        Routine prenatal care  RTC 4 weeks.      Rand Calle M.D.     "

## 2020-10-12 NOTE — NURSING NOTE
"Initial BP 98/54 (BP Location: Left arm, Patient Position: Chair, Cuff Size: Adult Regular)   Pulse 71   Temp 98.5  F (36.9  C) (Tympanic)   Resp 18   Ht 1.575 m (5' 2\")   Wt 67.6 kg (149 lb)   LMP 04/24/2020   BMI 27.25 kg/m   Estimated body mass index is 27.25 kg/m  as calculated from the following:    Height as of this encounter: 1.575 m (5' 2\").    Weight as of this encounter: 67.6 kg (149 lb). .      "

## 2020-10-13 LAB — T PALLIDUM AB SER QL: NONREACTIVE

## 2020-11-09 ENCOUNTER — PRENATAL OFFICE VISIT (OUTPATIENT)
Dept: OBGYN | Facility: CLINIC | Age: 30
End: 2020-11-09
Payer: COMMERCIAL

## 2020-11-09 VITALS
TEMPERATURE: 96.4 F | HEART RATE: 99 BPM | WEIGHT: 149 LBS | HEIGHT: 62 IN | RESPIRATION RATE: 18 BRPM | SYSTOLIC BLOOD PRESSURE: 125 MMHG | DIASTOLIC BLOOD PRESSURE: 66 MMHG | BODY MASS INDEX: 27.42 KG/M2

## 2020-11-09 DIAGNOSIS — Z34.82 PRENATAL CARE, SUBSEQUENT PREGNANCY IN SECOND TRIMESTER: Primary | ICD-10-CM

## 2020-11-09 PROCEDURE — 90715 TDAP VACCINE 7 YRS/> IM: CPT | Performed by: OBSTETRICS & GYNECOLOGY

## 2020-11-09 PROCEDURE — 90471 IMMUNIZATION ADMIN: CPT | Performed by: OBSTETRICS & GYNECOLOGY

## 2020-11-09 PROCEDURE — 99207 PR PRENATAL VISIT: CPT | Performed by: OBSTETRICS & GYNECOLOGY

## 2020-11-09 RX ORDER — FAMOTIDINE 20 MG
50000 TABLET ORAL WEEKLY
COMMUNITY
End: 2023-12-21

## 2020-11-09 RX ORDER — FERROUS GLUCONATE 324(38)MG
324 TABLET ORAL
Status: ON HOLD | COMMUNITY
End: 2021-01-23

## 2020-11-09 ASSESSMENT — MIFFLIN-ST. JEOR: SCORE: 1339.11

## 2020-11-09 NOTE — PROGRESS NOTES
"CC: Here for routine prenatal visit @ 28w3d   HPI:  Pt states still has daily nausea; weight stable but only 5 lbs total for pregnancy; discussed growth scan to assess baby  Discussed surge of Covid cases, importance of mask, hand washing and minimizing social gatherings.   Discussed updated CDC guidelines on TDAP vaccine in pregnancy between 27 and 36 weeks EGA .  Given today  PE: /66 (BP Location: Right arm, Patient Position: Chair, Cuff Size: Adult Regular)   Pulse 99   Temp 96.4  F (35.8  C) (Tympanic)   Resp 18   Ht 1.575 m (5' 2\")   Wt 67.6 kg (149 lb)   LMP 04/24/2020   Breastfeeding No   BMI 27.25 kg/m     See OB flowsheet      A:  1. Prenatal care, subsequent pregnancy in second trimester    - ADMIN 1st VACCINE    Growth ultrasound  Routine prenatal care  RTC 2 weeks.      Rand Calle M.D.     "

## 2020-11-09 NOTE — NURSING NOTE
Prior to immunization administration, verified patients identity using patient s name and date of birth. Please see Immunization Activity for additional information.     Screening Questionnaire for Adult Immunization    Are you sick today?   No   Do you have allergies to medications, food, a vaccine component or latex?   No   Have you ever had a serious reaction after receiving a vaccination?   No   Do you have a long-term health problem with heart, lung, kidney, or metabolic disease (e.g., diabetes), asthma, a blood disorder, no spleen, complement component deficiency, a cochlear implant, or a spinal fluid leak?  Are you on long-term aspirin therapy?   No   Do you have cancer, leukemia, HIV/AIDS, or any other immune system problem?   No   Do you have a parent, brother, or sister with an immune system problem?   No   In the past 3 months, have you taken medications that affect  your immune system, such as prednisone, other steroids, or anticancer drugs; drugs for the treatment of rheumatoid arthritis, Crohn s disease, or psoriasis; or have you had radiation treatments?   No   Have you had a seizure, or a brain or other nervous system problem?   No   During the past year, have you received a transfusion of blood or blood    products, or been given immune (gamma) globulin or antiviral drug?   No   For women: Are you pregnant or is there a chance you could become       pregnant during the next month?   yes   Have you received any vaccinations in the past 4 weeks?   No     Immunization questionnaire answers were all negative.        Per orders of Dr. Calle, injection of adacel given by Key Neumann CMA. Patient instructed to remain in clinic for 15 minutes afterwards, and to report any adverse reaction to me immediately.       Screening performed by Key Neumann CMA on 11/9/2020 at 4:23 PM.

## 2020-11-09 NOTE — NURSING NOTE
"Initial /66 (BP Location: Right arm, Patient Position: Chair, Cuff Size: Adult Regular)   Pulse 99   Temp 96.4  F (35.8  C) (Tympanic)   Resp 18   Ht 1.575 m (5' 2\")   Wt 67.6 kg (149 lb)   LMP 04/24/2020   Breastfeeding No   BMI 27.25 kg/m   Estimated body mass index is 27.25 kg/m  as calculated from the following:    Height as of this encounter: 1.575 m (5' 2\").    Weight as of this encounter: 67.6 kg (149 lb). .      "

## 2020-11-19 ENCOUNTER — HOSPITAL ENCOUNTER (OUTPATIENT)
Dept: ULTRASOUND IMAGING | Facility: CLINIC | Age: 30
Discharge: HOME | End: 2020-11-19
Attending: OBSTETRICS & GYNECOLOGY | Admitting: OBSTETRICS & GYNECOLOGY
Payer: COMMERCIAL

## 2020-11-19 DIAGNOSIS — Z34.82 PRENATAL CARE, SUBSEQUENT PREGNANCY IN SECOND TRIMESTER: ICD-10-CM

## 2020-11-19 PROCEDURE — 76816 OB US FOLLOW-UP PER FETUS: CPT

## 2020-11-23 ENCOUNTER — PRENATAL OFFICE VISIT (OUTPATIENT)
Dept: OBGYN | Facility: CLINIC | Age: 30
End: 2020-11-23
Payer: COMMERCIAL

## 2020-11-23 VITALS
HEART RATE: 88 BPM | TEMPERATURE: 97.2 F | SYSTOLIC BLOOD PRESSURE: 101 MMHG | HEIGHT: 62 IN | WEIGHT: 158.1 LBS | BODY MASS INDEX: 29.09 KG/M2 | DIASTOLIC BLOOD PRESSURE: 57 MMHG | RESPIRATION RATE: 16 BRPM

## 2020-11-23 DIAGNOSIS — Z34.83 PRENATAL CARE, SUBSEQUENT PREGNANCY IN THIRD TRIMESTER: Primary | ICD-10-CM

## 2020-11-23 PROCEDURE — 99207 PR PRENATAL VISIT: CPT | Performed by: OBSTETRICS & GYNECOLOGY

## 2020-11-23 ASSESSMENT — MIFFLIN-ST. JEOR: SCORE: 1380.39

## 2020-11-23 NOTE — PROGRESS NOTES
"CC: Here for routine prenatal visit @ 30w3d   HPI:  Sonogram 11/19 EFW 1647 gn (71%); still with nausea, but gained some weight    PE: /57 (BP Location: Right arm, Cuff Size: Adult Regular)   Pulse 88   Temp 97.2  F (36.2  C)   Resp 16   Ht 1.575 m (5' 2\")   Wt 71.7 kg (158 lb 1.6 oz)   LMP 04/24/2020   BMI 28.92 kg/m     See OB flowsheet      A:  1. Prenatal care, subsequent pregnancy in third trimester        Routine prenatal care  RTC 2 weeks.      Rand Calle M.D.     "

## 2020-11-25 DIAGNOSIS — F32.89 OTHER DEPRESSION: Primary | ICD-10-CM

## 2020-11-25 RX ORDER — ESCITALOPRAM OXALATE 20 MG/1
20 TABLET ORAL DAILY
Qty: 90 TABLET | Refills: 1 | Status: ON HOLD | OUTPATIENT
Start: 2020-11-25 | End: 2021-01-23

## 2020-11-25 NOTE — TELEPHONE ENCOUNTER
Last office visit 11/23/2020  Next office visit 12/7/2020    Patient reports current dose is 20 mg daily.  Patient was at 15 mg daily.    Patient requesting refills.  Please review and advise is in Dr. Calle's absence.    Thank you.    Shannon Blackman   [facility]  RN

## 2020-12-07 ENCOUNTER — PRENATAL OFFICE VISIT (OUTPATIENT)
Dept: OBGYN | Facility: CLINIC | Age: 30
End: 2020-12-07
Payer: COMMERCIAL

## 2020-12-07 VITALS
BODY MASS INDEX: 27.79 KG/M2 | DIASTOLIC BLOOD PRESSURE: 61 MMHG | SYSTOLIC BLOOD PRESSURE: 108 MMHG | WEIGHT: 151 LBS | HEIGHT: 62 IN | RESPIRATION RATE: 18 BRPM | TEMPERATURE: 96.7 F | HEART RATE: 95 BPM

## 2020-12-07 DIAGNOSIS — Z34.83 PRENATAL CARE, SUBSEQUENT PREGNANCY IN THIRD TRIMESTER: Primary | ICD-10-CM

## 2020-12-07 PROCEDURE — 99207 PR PRENATAL VISIT: CPT | Performed by: OBSTETRICS & GYNECOLOGY

## 2020-12-07 ASSESSMENT — MIFFLIN-ST. JEOR: SCORE: 1348.18

## 2020-12-07 NOTE — NURSING NOTE
"Initial /61 (BP Location: Right arm, Patient Position: Chair, Cuff Size: Adult Regular)   Pulse 95   Temp 96.7  F (35.9  C) (Tympanic)   Resp 18   Ht 1.575 m (5' 2\")   Wt 68.5 kg (151 lb)   LMP 04/24/2020   Breastfeeding No   BMI 27.62 kg/m   Estimated body mass index is 27.62 kg/m  as calculated from the following:    Height as of this encounter: 1.575 m (5' 2\").    Weight as of this encounter: 68.5 kg (151 lb). .      "

## 2020-12-07 NOTE — PROGRESS NOTES
"CC: Here for routine prenatal visit @ 32w3d   HPI:  Had stressful day at work today; self care encouraged; normal FM    PE: /61 (BP Location: Right arm, Patient Position: Chair, Cuff Size: Adult Regular)   Pulse 95   Temp 96.7  F (35.9  C) (Tympanic)   Resp 18   Ht 1.575 m (5' 2\")   Wt 68.5 kg (151 lb)   LMP 04/24/2020   Breastfeeding No   BMI 27.62 kg/m     See OB flowsheet      A:  1. Prenatal care, subsequent pregnancy in third trimester        Routine prenatal care  RTC 2 weeks.      Rand Calle M.D.     "

## 2020-12-14 ENCOUNTER — HEALTH MAINTENANCE LETTER (OUTPATIENT)
Age: 30
End: 2020-12-14

## 2020-12-21 ENCOUNTER — PRENATAL OFFICE VISIT (OUTPATIENT)
Dept: OBGYN | Facility: CLINIC | Age: 30
End: 2020-12-21
Payer: COMMERCIAL

## 2020-12-21 VITALS
RESPIRATION RATE: 18 BRPM | TEMPERATURE: 98.4 F | WEIGHT: 153 LBS | HEART RATE: 100 BPM | HEIGHT: 62 IN | SYSTOLIC BLOOD PRESSURE: 110 MMHG | DIASTOLIC BLOOD PRESSURE: 67 MMHG | BODY MASS INDEX: 28.16 KG/M2

## 2020-12-21 DIAGNOSIS — Z34.83 PRENATAL CARE, SUBSEQUENT PREGNANCY IN THIRD TRIMESTER: Primary | ICD-10-CM

## 2020-12-21 PROCEDURE — 99207 PR PRENATAL VISIT: CPT | Performed by: OBSTETRICS & GYNECOLOGY

## 2020-12-21 ASSESSMENT — MIFFLIN-ST. JEOR: SCORE: 1357.25

## 2020-12-21 NOTE — PROGRESS NOTES
"CC: Here for routine prenatal visit @ 34w3d   HPI: +FM. No ctx, VB or LOF.      PE: /67 (BP Location: Right arm, Patient Position: Chair, Cuff Size: Adult Regular)   Pulse 100   Temp 98.4  F (36.9  C) (Tympanic)   Resp 18   Ht 1.575 m (5' 2\")   Wt 69.4 kg (153 lb)   LMP 04/24/2020   Breastfeeding No   BMI 27.98 kg/m      See OB flowsheet        A:  1. Prenatal care, subsequent pregnancy in third trimester     Routine prenatal care  RTC 2 weeks.      Carol Cool MD  OB/GYN  "

## 2020-12-21 NOTE — NURSING NOTE
"Initial /67 (BP Location: Right arm, Patient Position: Chair, Cuff Size: Adult Regular)   Pulse 100   Temp 98.4  F (36.9  C) (Tympanic)   Resp 18   Ht 1.575 m (5' 2\")   Wt 69.4 kg (153 lb)   LMP 04/24/2020   Breastfeeding No   BMI 27.98 kg/m   Estimated body mass index is 27.98 kg/m  as calculated from the following:    Height as of this encounter: 1.575 m (5' 2\").    Weight as of this encounter: 69.4 kg (153 lb). .      "

## 2021-01-05 ENCOUNTER — PRENATAL OFFICE VISIT (OUTPATIENT)
Dept: OBGYN | Facility: CLINIC | Age: 31
End: 2021-01-05
Payer: COMMERCIAL

## 2021-01-05 VITALS
OXYGEN SATURATION: 97 % | HEART RATE: 84 BPM | DIASTOLIC BLOOD PRESSURE: 66 MMHG | SYSTOLIC BLOOD PRESSURE: 106 MMHG | BODY MASS INDEX: 28.89 KG/M2 | HEIGHT: 62 IN | TEMPERATURE: 97.5 F | WEIGHT: 157 LBS

## 2021-01-05 DIAGNOSIS — Z34.83 PRENATAL CARE, SUBSEQUENT PREGNANCY IN THIRD TRIMESTER: Primary | ICD-10-CM

## 2021-01-05 DIAGNOSIS — Z34.83 ENCOUNTER FOR SUPERVISION OF OTHER NORMAL PREGNANCY IN THIRD TRIMESTER: ICD-10-CM

## 2021-01-05 PROCEDURE — 99207 PR PRENATAL VISIT: CPT | Performed by: OBSTETRICS & GYNECOLOGY

## 2021-01-05 PROCEDURE — 87653 STREP B DNA AMP PROBE: CPT | Performed by: OBSTETRICS & GYNECOLOGY

## 2021-01-05 ASSESSMENT — MIFFLIN-ST. JEOR: SCORE: 1375.4

## 2021-01-05 NOTE — PROGRESS NOTES
Concerns:   Doing well.  No concerns today.  No vaginal bleeding, LOF, contractions.  No HA, RUQ pain, N/V, visual changes.  Cervix is posterior, finger-tip dilated and soft.  Cephalic position confirmed by Leopold maneuvers and cervical exam.  Reportable signs and symptoms discussed.  GBS done today.  Labor precautions discussed.  RTC 1 week.  Prenatal flowsheet information is reviewed.  Jesus Kellogg MD

## 2021-01-06 LAB
GP B STREP DNA SPEC QL NAA+PROBE: NEGATIVE
SPECIMEN SOURCE: NORMAL

## 2021-01-12 ENCOUNTER — PRENATAL OFFICE VISIT (OUTPATIENT)
Dept: OBGYN | Facility: CLINIC | Age: 31
End: 2021-01-12
Payer: COMMERCIAL

## 2021-01-12 VITALS
RESPIRATION RATE: 16 BRPM | HEART RATE: 94 BPM | WEIGHT: 157.2 LBS | HEIGHT: 62 IN | SYSTOLIC BLOOD PRESSURE: 109 MMHG | DIASTOLIC BLOOD PRESSURE: 63 MMHG | TEMPERATURE: 98.1 F | BODY MASS INDEX: 28.93 KG/M2

## 2021-01-12 DIAGNOSIS — Z34.83 PRENATAL CARE, SUBSEQUENT PREGNANCY IN THIRD TRIMESTER: Primary | ICD-10-CM

## 2021-01-12 PROCEDURE — 99207 PR PRENATAL VISIT: CPT | Performed by: OBSTETRICS & GYNECOLOGY

## 2021-01-12 ASSESSMENT — MIFFLIN-ST. JEOR: SCORE: 1376.3

## 2021-01-12 NOTE — PROGRESS NOTES
"CC: Here for routine prenatal visit @ 37w4d   HPI: + FM, no ctx, no LOF, no VB.  No complaints.     PE: /63 (BP Location: Left arm, Patient Position: Sitting, Cuff Size: Adult Regular)   Pulse 94   Temp 98.1  F (36.7  C) (Tympanic)   Resp 16   Ht 1.575 m (5' 2\")   Wt 71.3 kg (157 lb 3.2 oz)   LMP 2020   BMI 28.75 kg/m     See OB flowsheet    Cervix soft and mid    A/P  @ 37w4d normal pregnancy    1. Routine prenatal care.  COVID restrictions and recommendations reviewed including iron supplementation.  Considering IOL after 39 weeks.      RTC 1 week    Annabel Clarke M.D.    "

## 2021-01-12 NOTE — NURSING NOTE
"Initial /63 (BP Location: Left arm, Patient Position: Sitting, Cuff Size: Adult Regular)   Pulse 94   Temp 98.1  F (36.7  C) (Tympanic)   Resp 16   Ht 1.575 m (5' 2\")   Wt 71.3 kg (157 lb 3.2 oz)   LMP 04/24/2020   BMI 28.75 kg/m   Estimated body mass index is 28.75 kg/m  as calculated from the following:    Height as of this encounter: 1.575 m (5' 2\").    Weight as of this encounter: 71.3 kg (157 lb 3.2 oz). .    Shannon Blackman   [facility]  RN    "

## 2021-01-19 ENCOUNTER — PRENATAL OFFICE VISIT (OUTPATIENT)
Dept: OBGYN | Facility: CLINIC | Age: 31
End: 2021-01-19
Payer: COMMERCIAL

## 2021-01-19 VITALS
WEIGHT: 156 LBS | BODY MASS INDEX: 28.71 KG/M2 | HEART RATE: 88 BPM | RESPIRATION RATE: 18 BRPM | TEMPERATURE: 97.7 F | HEIGHT: 62 IN | SYSTOLIC BLOOD PRESSURE: 119 MMHG | DIASTOLIC BLOOD PRESSURE: 74 MMHG

## 2021-01-19 DIAGNOSIS — Z34.83 PRENATAL CARE, SUBSEQUENT PREGNANCY IN THIRD TRIMESTER: Primary | ICD-10-CM

## 2021-01-19 LAB
SARS-COV-2 RNA RESP QL NAA+PROBE: NORMAL
SPECIMEN SOURCE: NORMAL

## 2021-01-19 PROCEDURE — U0003 INFECTIOUS AGENT DETECTION BY NUCLEIC ACID (DNA OR RNA); SEVERE ACUTE RESPIRATORY SYNDROME CORONAVIRUS 2 (SARS-COV-2) (CORONAVIRUS DISEASE [COVID-19]), AMPLIFIED PROBE TECHNIQUE, MAKING USE OF HIGH THROUGHPUT TECHNOLOGIES AS DESCRIBED BY CMS-2020-01-R: HCPCS | Performed by: OBSTETRICS & GYNECOLOGY

## 2021-01-19 PROCEDURE — 99207 PR PRENATAL VISIT: CPT | Performed by: OBSTETRICS & GYNECOLOGY

## 2021-01-19 PROCEDURE — U0005 INFEC AGEN DETEC AMPLI PROBE: HCPCS | Performed by: OBSTETRICS & GYNECOLOGY

## 2021-01-19 RX ORDER — OXYTOCIN/0.9 % SODIUM CHLORIDE 30/500 ML
1-24 PLASTIC BAG, INJECTION (ML) INTRAVENOUS CONTINUOUS
Status: CANCELLED | OUTPATIENT
Start: 2021-01-19

## 2021-01-19 RX ORDER — NALOXONE HYDROCHLORIDE 0.4 MG/ML
0.4 INJECTION, SOLUTION INTRAMUSCULAR; INTRAVENOUS; SUBCUTANEOUS
Status: CANCELLED | OUTPATIENT
Start: 2021-01-19

## 2021-01-19 RX ORDER — FENTANYL CITRATE 50 UG/ML
50-100 INJECTION, SOLUTION INTRAMUSCULAR; INTRAVENOUS
Status: CANCELLED | OUTPATIENT
Start: 2021-01-19

## 2021-01-19 RX ORDER — OXYCODONE AND ACETAMINOPHEN 5; 325 MG/1; MG/1
1 TABLET ORAL
Status: CANCELLED | OUTPATIENT
Start: 2021-01-19

## 2021-01-19 RX ORDER — OXYTOCIN 10 [USP'U]/ML
10 INJECTION, SOLUTION INTRAMUSCULAR; INTRAVENOUS
Status: CANCELLED | OUTPATIENT
Start: 2021-01-19

## 2021-01-19 RX ORDER — TRANEXAMIC ACID 10 MG/ML
1 INJECTION, SOLUTION INTRAVENOUS EVERY 30 MIN PRN
Status: CANCELLED | OUTPATIENT
Start: 2021-01-19

## 2021-01-19 RX ORDER — IBUPROFEN 400 MG/1
800 TABLET, FILM COATED ORAL
Status: CANCELLED | OUTPATIENT
Start: 2021-01-19

## 2021-01-19 RX ORDER — METHYLERGONOVINE MALEATE 0.2 MG/ML
200 INJECTION INTRAVENOUS
Status: CANCELLED | OUTPATIENT
Start: 2021-01-19

## 2021-01-19 RX ORDER — CARBOPROST TROMETHAMINE 250 UG/ML
250 INJECTION, SOLUTION INTRAMUSCULAR
Status: CANCELLED | OUTPATIENT
Start: 2021-01-19

## 2021-01-19 RX ORDER — OXYTOCIN/0.9 % SODIUM CHLORIDE 30/500 ML
100-340 PLASTIC BAG, INJECTION (ML) INTRAVENOUS CONTINUOUS PRN
Status: CANCELLED | OUTPATIENT
Start: 2021-01-19

## 2021-01-19 RX ORDER — NALOXONE HYDROCHLORIDE 0.4 MG/ML
0.2 INJECTION, SOLUTION INTRAMUSCULAR; INTRAVENOUS; SUBCUTANEOUS
Status: CANCELLED | OUTPATIENT
Start: 2021-01-19

## 2021-01-19 RX ORDER — LIDOCAINE 40 MG/G
CREAM TOPICAL
Status: CANCELLED | OUTPATIENT
Start: 2021-01-19

## 2021-01-19 RX ORDER — ACETAMINOPHEN 325 MG/1
650 TABLET ORAL EVERY 4 HOURS PRN
Status: CANCELLED | OUTPATIENT
Start: 2021-01-19

## 2021-01-19 RX ORDER — SODIUM CHLORIDE, SODIUM LACTATE, POTASSIUM CHLORIDE, CALCIUM CHLORIDE 600; 310; 30; 20 MG/100ML; MG/100ML; MG/100ML; MG/100ML
INJECTION, SOLUTION INTRAVENOUS CONTINUOUS
Status: CANCELLED | OUTPATIENT
Start: 2021-01-19

## 2021-01-19 RX ORDER — ONDANSETRON 2 MG/ML
4 INJECTION INTRAMUSCULAR; INTRAVENOUS EVERY 6 HOURS PRN
Status: CANCELLED | OUTPATIENT
Start: 2021-01-19

## 2021-01-19 ASSESSMENT — MIFFLIN-ST. JEOR: SCORE: 1370.86

## 2021-01-19 NOTE — NURSING NOTE
"Initial /74 (BP Location: Right arm, Patient Position: Chair, Cuff Size: Adult Regular)   Pulse 88   Temp 97.7  F (36.5  C) (Tympanic)   Resp 18   Ht 1.575 m (5' 2\")   Wt 70.8 kg (156 lb)   LMP 04/24/2020   Breastfeeding No   BMI 28.53 kg/m   Estimated body mass index is 28.53 kg/m  as calculated from the following:    Height as of this encounter: 1.575 m (5' 2\").    Weight as of this encounter: 70.8 kg (156 lb). .      "

## 2021-01-19 NOTE — PROGRESS NOTES
"CC: Here for routine prenatal visit @ 38w4d   HPI:  Feeling very miserable; discussed IOL; will set up for 01/21/20    PE: /74 (BP Location: Right arm, Patient Position: Chair, Cuff Size: Adult Regular)   Pulse 88   Temp 97.7  F (36.5  C) (Tympanic)   Resp 18   Ht 1.575 m (5' 2\")   Wt 70.8 kg (156 lb)   LMP 04/24/2020   Breastfeeding No   BMI 28.53 kg/m     See OB flowsheet      A:  1. Prenatal care, subsequent pregnancy in third trimester        IOL 1/21/21; Covid test preinduction      Rand Calle M.D.     "

## 2021-01-20 ENCOUNTER — ANESTHESIA EVENT (OUTPATIENT)
Dept: OBGYN | Facility: CLINIC | Age: 31
End: 2021-01-20
Payer: COMMERCIAL

## 2021-01-20 ENCOUNTER — HOSPITAL ENCOUNTER (INPATIENT)
Facility: CLINIC | Age: 31
LOS: 3 days | Discharge: HOME | End: 2021-01-23
Attending: OBSTETRICS & GYNECOLOGY | Admitting: OBSTETRICS & GYNECOLOGY
Payer: COMMERCIAL

## 2021-01-20 ENCOUNTER — NURSE TRIAGE (OUTPATIENT)
Dept: NURSING | Facility: CLINIC | Age: 31
End: 2021-01-20

## 2021-01-20 ENCOUNTER — ANESTHESIA (OUTPATIENT)
Dept: OBGYN | Facility: CLINIC | Age: 31
End: 2021-01-20
Payer: COMMERCIAL

## 2021-01-20 DIAGNOSIS — Z30.2 ENCOUNTER FOR STERILIZATION: Primary | ICD-10-CM

## 2021-01-20 PROBLEM — Z34.90 PREGNANT: Status: ACTIVE | Noted: 2021-01-20

## 2021-01-20 PROBLEM — Z34.83 PRENATAL CARE, SUBSEQUENT PREGNANCY IN THIRD TRIMESTER: Status: ACTIVE | Noted: 2021-01-20

## 2021-01-20 LAB
ABO + RH BLD: NORMAL
ABO + RH BLD: NORMAL
BASOPHILS # BLD AUTO: 0 10E9/L (ref 0–0.2)
BASOPHILS NFR BLD AUTO: 0.2 %
BLD GP AB SCN SERPL QL: NORMAL
BLOOD BANK CMNT PATIENT-IMP: NORMAL
DIFFERENTIAL METHOD BLD: ABNORMAL
EOSINOPHIL # BLD AUTO: 0.1 10E9/L (ref 0–0.7)
EOSINOPHIL NFR BLD AUTO: 0.4 %
ERYTHROCYTE [DISTWIDTH] IN BLOOD BY AUTOMATED COUNT: 12.8 % (ref 10–15)
HCT VFR BLD AUTO: 35.3 % (ref 35–47)
HGB BLD-MCNC: 12.3 G/DL (ref 11.7–15.7)
IMM GRANULOCYTES # BLD: 0.1 10E9/L (ref 0–0.4)
IMM GRANULOCYTES NFR BLD: 0.4 %
LABORATORY COMMENT REPORT: NORMAL
LYMPHOCYTES # BLD AUTO: 1.4 10E9/L (ref 0.8–5.3)
LYMPHOCYTES NFR BLD AUTO: 10.5 %
MCH RBC QN AUTO: 31.4 PG (ref 26.5–33)
MCHC RBC AUTO-ENTMCNC: 34.8 G/DL (ref 31.5–36.5)
MCV RBC AUTO: 90 FL (ref 78–100)
MONOCYTES # BLD AUTO: 0.7 10E9/L (ref 0–1.3)
MONOCYTES NFR BLD AUTO: 5.6 %
NEUTROPHILS # BLD AUTO: 11.1 10E9/L (ref 1.6–8.3)
NEUTROPHILS NFR BLD AUTO: 82.9 %
NRBC # BLD AUTO: 0 10*3/UL
NRBC BLD AUTO-RTO: 0 /100
PLATELET # BLD AUTO: 238 10E9/L (ref 150–450)
RBC # BLD AUTO: 3.92 10E12/L (ref 3.8–5.2)
SARS-COV-2 RNA RESP QL NAA+PROBE: NEGATIVE
SPECIMEN EXP DATE BLD: NORMAL
SPECIMEN SOURCE: NORMAL
WBC # BLD AUTO: 13.3 10E9/L (ref 4–11)

## 2021-01-20 PROCEDURE — 370N000003 HC ANESTHESIA WARD SERVICE: Performed by: NURSE ANESTHETIST, CERTIFIED REGISTERED

## 2021-01-20 PROCEDURE — 85025 COMPLETE CBC W/AUTO DIFF WBC: CPT | Performed by: OBSTETRICS & GYNECOLOGY

## 2021-01-20 PROCEDURE — 258N000003 HC RX IP 258 OP 636: Performed by: OBSTETRICS & GYNECOLOGY

## 2021-01-20 PROCEDURE — 250N000009 HC RX 250: Performed by: OBSTETRICS & GYNECOLOGY

## 2021-01-20 PROCEDURE — G0463 HOSPITAL OUTPT CLINIC VISIT: HCPCS | Mod: 25

## 2021-01-20 PROCEDURE — 120N000001 HC R&B MED SURG/OB

## 2021-01-20 PROCEDURE — 999N000011 HC STATISTIC ANESTHESIA CASE

## 2021-01-20 PROCEDURE — 86850 RBC ANTIBODY SCREEN: CPT | Performed by: OBSTETRICS & GYNECOLOGY

## 2021-01-20 PROCEDURE — 86901 BLOOD TYPING SEROLOGIC RH(D): CPT | Performed by: OBSTETRICS & GYNECOLOGY

## 2021-01-20 PROCEDURE — 250N000011 HC RX IP 250 OP 636: Performed by: NURSE ANESTHETIST, CERTIFIED REGISTERED

## 2021-01-20 PROCEDURE — 86900 BLOOD TYPING SEROLOGIC ABO: CPT | Performed by: OBSTETRICS & GYNECOLOGY

## 2021-01-20 PROCEDURE — 86780 TREPONEMA PALLIDUM: CPT | Performed by: OBSTETRICS & GYNECOLOGY

## 2021-01-20 PROCEDURE — 250N000009 HC RX 250: Performed by: NURSE ANESTHETIST, CERTIFIED REGISTERED

## 2021-01-20 RX ORDER — LIDOCAINE HYDROCHLORIDE 10 MG/ML
INJECTION, SOLUTION INFILTRATION; PERINEURAL PRN
Status: DISCONTINUED | OUTPATIENT
Start: 2021-01-20 | End: 2021-01-21

## 2021-01-20 RX ORDER — NALOXONE HYDROCHLORIDE 0.4 MG/ML
0.4 INJECTION, SOLUTION INTRAMUSCULAR; INTRAVENOUS; SUBCUTANEOUS
Status: DISCONTINUED | OUTPATIENT
Start: 2021-01-20 | End: 2021-01-21

## 2021-01-20 RX ORDER — ACETAMINOPHEN 325 MG/1
650 TABLET ORAL EVERY 4 HOURS PRN
Status: DISCONTINUED | OUTPATIENT
Start: 2021-01-20 | End: 2021-01-21

## 2021-01-20 RX ORDER — ONDANSETRON 2 MG/ML
4 INJECTION INTRAMUSCULAR; INTRAVENOUS EVERY 6 HOURS PRN
Status: DISCONTINUED | OUTPATIENT
Start: 2021-01-20 | End: 2021-01-21

## 2021-01-20 RX ORDER — METHYLERGONOVINE MALEATE 0.2 MG/ML
200 INJECTION INTRAVENOUS
Status: DISCONTINUED | OUTPATIENT
Start: 2021-01-20 | End: 2021-01-21

## 2021-01-20 RX ORDER — FENTANYL CITRATE 50 UG/ML
50-100 INJECTION, SOLUTION INTRAMUSCULAR; INTRAVENOUS
Status: DISCONTINUED | OUTPATIENT
Start: 2021-01-20 | End: 2021-01-21

## 2021-01-20 RX ORDER — CARBOPROST TROMETHAMINE 250 UG/ML
250 INJECTION, SOLUTION INTRAMUSCULAR
Status: DISCONTINUED | OUTPATIENT
Start: 2021-01-20 | End: 2021-01-21

## 2021-01-20 RX ORDER — TRANEXAMIC ACID 10 MG/ML
1 INJECTION, SOLUTION INTRAVENOUS EVERY 30 MIN PRN
Status: DISCONTINUED | OUTPATIENT
Start: 2021-01-20 | End: 2021-01-21

## 2021-01-20 RX ORDER — DIPHENHYDRAMINE HYDROCHLORIDE 50 MG/ML
25 INJECTION INTRAMUSCULAR; INTRAVENOUS EVERY 6 HOURS PRN
Status: DISCONTINUED | OUTPATIENT
Start: 2021-01-20 | End: 2021-01-21

## 2021-01-20 RX ORDER — LIDOCAINE 40 MG/G
CREAM TOPICAL
Status: DISCONTINUED | OUTPATIENT
Start: 2021-01-20 | End: 2021-01-21

## 2021-01-20 RX ORDER — OXYCODONE AND ACETAMINOPHEN 5; 325 MG/1; MG/1
1 TABLET ORAL
Status: DISCONTINUED | OUTPATIENT
Start: 2021-01-20 | End: 2021-01-21

## 2021-01-20 RX ORDER — NALOXONE HYDROCHLORIDE 0.4 MG/ML
0.2 INJECTION, SOLUTION INTRAMUSCULAR; INTRAVENOUS; SUBCUTANEOUS
Status: DISCONTINUED | OUTPATIENT
Start: 2021-01-20 | End: 2021-01-21

## 2021-01-20 RX ORDER — IBUPROFEN 800 MG/1
800 TABLET, FILM COATED ORAL
Status: DISCONTINUED | OUTPATIENT
Start: 2021-01-20 | End: 2021-01-21

## 2021-01-20 RX ORDER — LIDOCAINE HYDROCHLORIDE AND EPINEPHRINE 15; 5 MG/ML; UG/ML
INJECTION, SOLUTION EPIDURAL PRN
Status: DISCONTINUED | OUTPATIENT
Start: 2021-01-20 | End: 2021-01-21

## 2021-01-20 RX ORDER — OXYTOCIN 10 [USP'U]/ML
10 INJECTION, SOLUTION INTRAMUSCULAR; INTRAVENOUS
Status: DISCONTINUED | OUTPATIENT
Start: 2021-01-20 | End: 2021-01-21

## 2021-01-20 RX ORDER — HYDROXYZINE HYDROCHLORIDE 50 MG/1
50 TABLET, FILM COATED ORAL EVERY 6 HOURS PRN
Status: DISCONTINUED | OUTPATIENT
Start: 2021-01-20 | End: 2021-01-21

## 2021-01-20 RX ORDER — OXYTOCIN/0.9 % SODIUM CHLORIDE 30/500 ML
1-24 PLASTIC BAG, INJECTION (ML) INTRAVENOUS CONTINUOUS
Status: DISCONTINUED | OUTPATIENT
Start: 2021-01-20 | End: 2021-01-21

## 2021-01-20 RX ORDER — SODIUM CHLORIDE, SODIUM LACTATE, POTASSIUM CHLORIDE, CALCIUM CHLORIDE 600; 310; 30; 20 MG/100ML; MG/100ML; MG/100ML; MG/100ML
INJECTION, SOLUTION INTRAVENOUS CONTINUOUS
Status: DISCONTINUED | OUTPATIENT
Start: 2021-01-20 | End: 2021-01-21

## 2021-01-20 RX ORDER — OXYTOCIN/0.9 % SODIUM CHLORIDE 30/500 ML
100-340 PLASTIC BAG, INJECTION (ML) INTRAVENOUS CONTINUOUS PRN
Status: COMPLETED | OUTPATIENT
Start: 2021-01-20 | End: 2021-01-21

## 2021-01-20 RX ORDER — ONDANSETRON 2 MG/ML
4 INJECTION INTRAMUSCULAR; INTRAVENOUS EVERY 6 HOURS PRN
Status: DISCONTINUED | OUTPATIENT
Start: 2021-01-20 | End: 2021-01-20

## 2021-01-20 RX ORDER — EPHEDRINE SULFATE 50 MG/ML
5 INJECTION, SOLUTION INTRAMUSCULAR; INTRAVENOUS; SUBCUTANEOUS
Status: DISCONTINUED | OUTPATIENT
Start: 2021-01-20 | End: 2021-01-21

## 2021-01-20 RX ORDER — ACETAMINOPHEN 325 MG/1
650 TABLET ORAL EVERY 4 HOURS PRN
Status: DISCONTINUED | OUTPATIENT
Start: 2021-01-20 | End: 2021-01-20

## 2021-01-20 RX ORDER — DIPHENHYDRAMINE HCL 25 MG
25 CAPSULE ORAL EVERY 6 HOURS PRN
Status: DISCONTINUED | OUTPATIENT
Start: 2021-01-20 | End: 2021-01-21

## 2021-01-20 RX ADMIN — SODIUM CHLORIDE, POTASSIUM CHLORIDE, SODIUM LACTATE AND CALCIUM CHLORIDE 1000 ML: 600; 310; 30; 20 INJECTION, SOLUTION INTRAVENOUS at 20:30

## 2021-01-20 RX ADMIN — Medication: at 21:26

## 2021-01-20 RX ADMIN — LIDOCAINE HYDROCHLORIDE 100 MG: 10 INJECTION, SOLUTION INFILTRATION; PERINEURAL at 21:25

## 2021-01-20 RX ADMIN — LIDOCAINE HYDROCHLORIDE AND EPINEPHRINE 75 MG: 15; 5 INJECTION, SOLUTION EPIDURAL at 21:24

## 2021-01-20 RX ADMIN — Medication 8 ML: at 21:26

## 2021-01-20 RX ADMIN — SODIUM CHLORIDE, POTASSIUM CHLORIDE, SODIUM LACTATE AND CALCIUM CHLORIDE: 600; 310; 30; 20 INJECTION, SOLUTION INTRAVENOUS at 21:43

## 2021-01-20 RX ADMIN — Medication 2 MILLI-UNITS/MIN: at 23:19

## 2021-01-20 ASSESSMENT — ACTIVITIES OF DAILY LIVING (ADL)
TOILETING_ISSUES: NO
FALL_HISTORY_WITHIN_LAST_SIX_MONTHS: NO

## 2021-01-20 ASSESSMENT — LIFESTYLE VARIABLES: TOBACCO_USE: 1

## 2021-01-21 LAB — T PALLIDUM AB SER QL: NONREACTIVE

## 2021-01-21 PROCEDURE — 250N000009 HC RX 250: Performed by: OBSTETRICS & GYNECOLOGY

## 2021-01-21 PROCEDURE — 59400 OBSTETRICAL CARE: CPT | Performed by: OBSTETRICS & GYNECOLOGY

## 2021-01-21 PROCEDURE — 250N000011 HC RX IP 250 OP 636: Performed by: NURSE ANESTHETIST, CERTIFIED REGISTERED

## 2021-01-21 PROCEDURE — 250N000011 HC RX IP 250 OP 636: Performed by: OBSTETRICS & GYNECOLOGY

## 2021-01-21 PROCEDURE — 250N000013 HC RX MED GY IP 250 OP 250 PS 637: Performed by: OBSTETRICS & GYNECOLOGY

## 2021-01-21 PROCEDURE — 120N000001 HC R&B MED SURG/OB

## 2021-01-21 PROCEDURE — 722N000001 HC LABOR CARE VAGINAL DELIVERY SINGLE

## 2021-01-21 PROCEDURE — 0UQMXZZ REPAIR VULVA, EXTERNAL APPROACH: ICD-10-PCS | Performed by: OBSTETRICS & GYNECOLOGY

## 2021-01-21 PROCEDURE — 10907ZC DRAINAGE OF AMNIOTIC FLUID, THERAPEUTIC FROM PRODUCTS OF CONCEPTION, VIA NATURAL OR ARTIFICIAL OPENING: ICD-10-PCS | Performed by: OBSTETRICS & GYNECOLOGY

## 2021-01-21 PROCEDURE — 258N000003 HC RX IP 258 OP 636: Performed by: OBSTETRICS & GYNECOLOGY

## 2021-01-21 RX ORDER — BISACODYL 10 MG
10 SUPPOSITORY, RECTAL RECTAL DAILY PRN
Status: DISCONTINUED | OUTPATIENT
Start: 2021-01-23 | End: 2021-01-23 | Stop reason: HOSPADM

## 2021-01-21 RX ORDER — TRANEXAMIC ACID 10 MG/ML
1 INJECTION, SOLUTION INTRAVENOUS EVERY 30 MIN PRN
Status: DISCONTINUED | OUTPATIENT
Start: 2021-01-21 | End: 2021-01-23 | Stop reason: HOSPADM

## 2021-01-21 RX ORDER — AMOXICILLIN 250 MG
2 CAPSULE ORAL 2 TIMES DAILY
Status: DISCONTINUED | OUTPATIENT
Start: 2021-01-21 | End: 2021-01-23 | Stop reason: HOSPADM

## 2021-01-21 RX ORDER — OXYTOCIN/0.9 % SODIUM CHLORIDE 30/500 ML
100 PLASTIC BAG, INJECTION (ML) INTRAVENOUS CONTINUOUS
Status: DISCONTINUED | OUTPATIENT
Start: 2021-01-21 | End: 2021-01-22

## 2021-01-21 RX ORDER — CALCIUM GLUCONATE 94 MG/ML
INJECTION, SOLUTION INTRAVENOUS
Status: DISCONTINUED
Start: 2021-01-21 | End: 2021-01-22 | Stop reason: HOSPADM

## 2021-01-21 RX ORDER — ACETAMINOPHEN 325 MG/1
975 TABLET ORAL ONCE
Status: COMPLETED | OUTPATIENT
Start: 2021-01-21 | End: 2021-01-22

## 2021-01-21 RX ORDER — OXYTOCIN/0.9 % SODIUM CHLORIDE 30/500 ML
340 PLASTIC BAG, INJECTION (ML) INTRAVENOUS CONTINUOUS PRN
Status: DISCONTINUED | OUTPATIENT
Start: 2021-01-21 | End: 2021-01-22

## 2021-01-21 RX ORDER — CALCIUM CARBONATE 500 MG/1
500 TABLET, CHEWABLE ORAL DAILY PRN
Status: DISCONTINUED | OUTPATIENT
Start: 2021-01-21 | End: 2021-01-21

## 2021-01-21 RX ORDER — HYDROCORTISONE 2.5 %
CREAM (GRAM) TOPICAL 3 TIMES DAILY PRN
Status: DISCONTINUED | OUTPATIENT
Start: 2021-01-21 | End: 2021-01-23 | Stop reason: HOSPADM

## 2021-01-21 RX ORDER — IBUPROFEN 800 MG/1
800 TABLET, FILM COATED ORAL EVERY 6 HOURS PRN
Status: DISCONTINUED | OUTPATIENT
Start: 2021-01-21 | End: 2021-01-23 | Stop reason: HOSPADM

## 2021-01-21 RX ORDER — MODIFIED LANOLIN
OINTMENT (GRAM) TOPICAL
Status: DISCONTINUED | OUTPATIENT
Start: 2021-01-21 | End: 2021-01-23 | Stop reason: HOSPADM

## 2021-01-21 RX ORDER — ACETAMINOPHEN 325 MG/1
650 TABLET ORAL EVERY 4 HOURS PRN
Status: DISCONTINUED | OUTPATIENT
Start: 2021-01-21 | End: 2021-01-23 | Stop reason: HOSPADM

## 2021-01-21 RX ORDER — LIDOCAINE 40 MG/G
CREAM TOPICAL
Status: DISCONTINUED | OUTPATIENT
Start: 2021-01-21 | End: 2021-01-23 | Stop reason: HOSPADM

## 2021-01-21 RX ORDER — CITRIC ACID/SODIUM CITRATE 334-500MG
30 SOLUTION, ORAL ORAL ONCE
Status: DISCONTINUED | OUTPATIENT
Start: 2021-01-21 | End: 2021-01-22 | Stop reason: HOSPADM

## 2021-01-21 RX ORDER — OXYTOCIN 10 [USP'U]/ML
10 INJECTION, SOLUTION INTRAMUSCULAR; INTRAVENOUS
Status: DISCONTINUED | OUTPATIENT
Start: 2021-01-21 | End: 2021-01-23 | Stop reason: HOSPADM

## 2021-01-21 RX ORDER — AMOXICILLIN 250 MG
1 CAPSULE ORAL 2 TIMES DAILY
Status: DISCONTINUED | OUTPATIENT
Start: 2021-01-21 | End: 2021-01-23 | Stop reason: HOSPADM

## 2021-01-21 RX ORDER — ESCITALOPRAM OXALATE 20 MG/1
20 TABLET ORAL DAILY
Status: DISCONTINUED | OUTPATIENT
Start: 2021-01-21 | End: 2021-01-23 | Stop reason: HOSPADM

## 2021-01-21 RX ADMIN — IBUPROFEN 800 MG: 800 TABLET ORAL at 17:22

## 2021-01-21 RX ADMIN — IBUPROFEN 800 MG: 800 TABLET ORAL at 11:21

## 2021-01-21 RX ADMIN — Medication: at 04:46

## 2021-01-21 RX ADMIN — Medication 340 ML/HR: at 09:09

## 2021-01-21 RX ADMIN — IBUPROFEN 800 MG: 800 TABLET ORAL at 23:27

## 2021-01-21 RX ADMIN — ESCITALOPRAM OXALATE 20 MG: 20 TABLET ORAL at 11:21

## 2021-01-21 RX ADMIN — ONDANSETRON 4 MG: 2 INJECTION INTRAMUSCULAR; INTRAVENOUS at 04:37

## 2021-01-21 RX ADMIN — CALCIUM CARBONATE (ANTACID) CHEW TAB 500 MG 500 MG: 500 CHEW TAB at 02:24

## 2021-01-21 RX ADMIN — DOCUSATE SODIUM AND SENNOSIDES 1 TABLET: 8.6; 5 TABLET ORAL at 21:01

## 2021-01-21 RX ADMIN — Medication 2 MILLI-UNITS/MIN: at 01:39

## 2021-01-21 RX ADMIN — SODIUM CHLORIDE, POTASSIUM CHLORIDE, SODIUM LACTATE AND CALCIUM CHLORIDE: 600; 310; 30; 20 INJECTION, SOLUTION INTRAVENOUS at 04:40

## 2021-01-21 NOTE — PLAN OF CARE
Fetal heart tones assessed and Category 1 tracing present. Uterine activity assessed and normal uterine activity present; contractions spaced out after epidural placement; no cervical change from admission. SVE 4/80/-1. Interventions included side-lying release bilaterally. Fetal heart tones returned to baseline. Dr. FABRICIO Cool was notified [id number] . Plan is to start low dose pitocin. Patient informed of and agrees with plan of care.

## 2021-01-21 NOTE — ANESTHESIA PROCEDURE NOTES
Pre-Procedure   Staff -   CRNA: Jennifer White APRN CRNA  Performed By: CRNA  Location: OB  Procedure Start/Stop Times: 1/20/2021 9:09 PM and 1/20/2021 9:43 PM  Pre-Anesthestic Checklist: patient identified, IV checked, risks and benefits discussed, informed consent, monitors and equipment checked, pre-op evaluation and at physician/surgeon's request  Timeout:  Correct Patient: Yes   Correct Procedure: Yes   Correct Site: Yes   Correct Position: Yes   Correct Laterality: N/A   Site Marked: N/A    Procedure Documentation  Procedure: epidural catheter  Patient Position:sitting  Patient Prep/Sterile Barriers: sterile gloves, mask, DuraPrep, patient draped  Local skin infiltrated with 10 mL of 1% lidocaine.   Insertion Site: L3-4. (midline approach).  Injection technique: LORT saline   Needle type: Touhy needle  Needle Gauge: 17.   Needle Length (Inches) 3.5   Catheter: 19 G.    Catheter threaded easily.      Assessment/Narrative      Paresthesias: No.  Test dose of 5 mL at 21:26.   Test dose negative, 3 minutes after injection, for signs of intravascular, subdural, or intrathecal injection.  Insertion/Infusion Method: LORT saline  Aspiration negative for heme or CSF.  Sensory Level Left: T8.  Sensory Level Right: T8.  Comments:  VAS pain score prior to epidural:10    VAS pain score after epidural:3    Pt. Tolerated well, FHR stable.

## 2021-01-21 NOTE — L&D DELIVERY NOTE
"Delivery Summary    Lanie Martin MRN# [id number]   Age: 30 year old YOB: 1990     ASSESSMENT & PLAN: Ms Martin is a 31 yo  who presented in spontaneous labor at 38w6d. Pregnancy complicated by anx/dep. Epidural was placed for pain management and she progressed to complete dilation. SROM of thin meconium-fluid. She pushed to a slow crown and delivered a vigorous male infant vertex, [place] via . APGARs 8 and 9. Weight pending due to skin-to-skin contact. P-NP team present, but spontaneous cry was noted at the perineum. Placenta delivered via gentle cord traction and was noted to be intact with a 3V cord. QBL 50cc. Bilateral periurethral lacerations were repaired with 4-0 vicryl. Fundus firm with fundal massage and IV pitocin.   Mom and baby \"Ace\" were stable for transport.        Veronica, Pending Baby Lanie [[id number]]    Labor Event Times    Labor onset date: 21 Onset time:  8:00 PM   Dilation complete date: 21 Complete time:  8:58 AM   Start pushing date/time: 2021 0858      Labor Events     labor?: No   steroids: None  Labor Type: Spontaneous, Augmentation  Predominate monitoring during 1st stage: continuous electronic fetal monitoring     Antibiotics received during labor?: No     Rupture date/time: 21 0300   Rupture type: Spontaneous rupture of membranes occuring during spontaneous labor or augmentation  Fluid color: Clear, Meconium, Other (comment)  Fluid odor: Normal     Augmentation: Oxytocin  Augmentation date/time: 21 2318   Indications for augmentation: Ineffective Contraction Pattern  1:1 continuous labor support provided by?: RN       Delivery/Placenta Date and Time    Delivery Date: 21 Delivery Time:  9:07 AM   Placenta Date/Time: 2021  9:12 AM     Vaginal Counts     Initial count performed by 2 team members:  Two Team Members   Leatha Whitaker RN       Needles Suture [place] [employer]   Initial " counts 2  5    Added to count       Final counts       Placed during labor Accounted for at the end of labor           Apgars    Living status: Living   1 Minute 5 Minute 10 Minute 15 Minute 20 Minute   Skin color: 0  1       Heart rate: 2  2       Reflex irritability: 2  2       Muscle tone: 2  2       Respiratory effort: 2  2       Total: 8  9       Apgars assigned by: MAYLIN CARREON RN     Skin to Skin and Feeding Plan    Skin to skin initiation date/time:     Skin to skin end date/time:     How do you plan to feed your baby: Breastfeeding     Labor Events and Shoulder Dystocia    Fetal Tracing Prior to Delivery: Category 1  Shoulder dystocia present?: Neg     Delivery (Maternal) (Provider to Complete) ([id number])    Episiotomy: None  Perineal lacerations: None    Periurethral laceration: bilateral Repaired?: Yes      Blood Loss  Mother: VeronicaRichard [email or web address] Fam #[id number]   Start of Mother's Information    IO Blood Loss  01/20/21 2000 - 01/21/21 0918    None           End of Mother's Information  Mother: Lanie Martin #[id number]          Delivery - Provider to Complete ([id number])    Delivering clinician: Carol Cool MD  Attempted Delivery Types (Choose all that apply): Spontaneous Vaginal Delivery  Delivery Type (Choose the 1 that will go to the Birth History): Vaginal, Spontaneous                   Other personnel:  Provider Role   Yolanda Carreon RN Delivery Nurse   Patricia Villa RN Charge Nurse   Graham Chong APRN CNP Nurse Practitioner                Placenta    Delayed Cord Clamping: Done  Date/Time: 1/21/2021  9:12 AM  Removal: Spontaneous  Comments: 3V cord, intact  Disposition: Hospital disposal           Anesthesia    Method: Epidural                Presentation and Position    Presentation: Vertex    Position: Left Occiput Anterior                 Carol Cool MD

## 2021-01-21 NOTE — PLAN OF CARE
Jennifer Tuttle CRNA called and in room at 2113. Patient and procedure correctly identified/verified with CRNA. Time out completed. Consent signed. 1000cc fluid bolus given. Patient in position for epidural placement. Epidural placed without complications. Test dose/bolus given by CRNA and patient tolerated well. Patient rated her pain as 10/10 prior to epidural and after medication administration, pain level 0/10. Ephedrine was Not given at this time . Dermatomes were assessed.

## 2021-01-21 NOTE — PLAN OF CARE
"Fetal heart tones assessed and moderate variability  and accelerations present. Uterine activity assessed and normal uterine activity present; irregular pattern noted; attempted to give oxytocin x 2 and tachysytole noted both times; oxytocin stopped. Interventions included forward-leaning inversion, \"shake the apples\" and knees close,heels out. Fetal heart tones returned to baseline. Dr. FABRICIO Cool aware and on her way in. Plan is to continue to monitor. Patient informed of and agrees with plan of care.   "

## 2021-01-21 NOTE — PROGRESS NOTES
[facility] arrived to the birthplace at 1945 stating that she has been wale since 1400 today. Cat 1 tracing, wale every 3 min. SVE 3-4/70/80/-2, no bloody show, intact. Pt would like an epidural. Dr. Cool made away. Orders released. Report given to Sil GRULLON.

## 2021-01-21 NOTE — PROGRESS NOTES
Labor Progress Note:   S: Feeling pressure and pushy.  O:   VS: /63   Pulse 94   Temp 99.1  F (37.3  C) (Oral)   Resp 18   LMP 2020   SpO2 94%   Crx: /0, AROM of forebag with thin meconuim-stained fluid  FHT: baseline nl, mod michelle, +accels, no decels  [place]: 3 in 10     A/P: 29 yo  at 38w6d in spon labor  Anticipate   Cat 1 reactive, con FM    Carol Cool MD

## 2021-01-21 NOTE — ANESTHESIA PREPROCEDURE EVALUATION
Anesthesia Pre-Procedure Evaluation    Patient: Lanie Martin   MRN: [id number] : 1990          Preoperative Diagnosis: * No pre-op diagnosis entered *    * No procedures listed *    Past Medical History:   Diagnosis Date     Carpal tunnel syndrome     during pregnancy     Chickenpox      Depression      Past Surgical History:   Procedure Laterality Date     AS CREATE EARDRUM OPENING,GEN ANESTH       MOUTH SURGERY      wisdom teeth       Anesthesia Evaluation     . Pt has had prior anesthetic.     No history of anesthetic complications          ROS/MED HX    ENT/Pulmonary:     (+) tobacco use, Past use,     Neurologic:  - neg neurologic ROS     Cardiovascular:  - neg cardiovascular ROS       METS/Exercise Tolerance:  >4 METS   Hematologic:  - neg hematologic  ROS       Musculoskeletal:  - neg musculoskeletal ROS       GI/Hepatic:  - neg GI/hepatic ROS       Renal/Genitourinary:  - neg Renal ROS       Endo:  - neg endo ROS       Psychiatric:     (+) psychiatric history depression       Infectious Disease:  - neg infectious disease ROS       Malignancy:       Other:    (+) Possibly pregnant, ,                    neg OB ROS.            Physical Exam  Normal systems: cardiovascular, pulmonary and dental    Airway   Mallampati: II  TM distance: >3 FB  Neck ROM: full    Dental     Cardiovascular       Pulmonary             Lab Results   Component Value Date    WBC 13.3 (H) 2021    HGB 12.3 2021    HCT 35.3 2021     2021     2020    POTASSIUM 3.9 2020    CHLORIDE 109 2020    CO2 28 2020    BUN 11 2020    CR 0.62 2020    GLC 88 2020    PEPPER 8.4 (L) 2020    ALBUMIN 4.1 2020    PROTTOTAL 7.4 2020    ALT 20 2020    AST 13 2020    ALKPHOS 50 2020    BILITOTAL 0.2 2020    LIPASE 135 2020    HCG Positive (A) 2020       Preop Vitals  BP Readings from Last 3 Encounters:   21 113/69  "  01/19/21 119/74   01/12/21 109/63    Pulse Readings from Last 3 Encounters:   01/20/21 94   01/19/21 88   01/12/21 94      Resp Readings from Last 3 Encounters:   01/20/21 18   01/19/21 18   01/12/21 16    SpO2 Readings from Last 3 Encounters:   01/05/21 97%   05/28/20 99%   02/05/19 97%      Temp Readings from Last 1 Encounters:   01/20/21 36.6  C (97.9  F) (Oral)    Ht Readings from Last 1 Encounters:   01/19/21 1.575 m (5' 2\")      Wt Readings from Last 1 Encounters:   01/19/21 70.8 kg (156 lb)    Estimated body mass index is 28.53 kg/m  as calculated from the following:    Height as of 1/19/21: 1.575 m (5' 2\").    Weight as of 1/19/21: 70.8 kg (156 lb).       Anesthesia Plan      History & Physical Review  History and physical reviewed and following examination; no interval change.    ASA Status:  2.     Plan for Epidural         PONV prophylaxis:  Ondansetron (or other 5HT-3), Dexamethasone or Solumedrol and Scopolamine patch.         Postoperative Care  Postoperative pain management: IV analgesics and Oral pain medications.     Consents  Anesthetic plan, risks, benefits and alternatives discussed with:  Patient, Patient and Spouse..                 GAMALIEL Shukla CRNA    Anesthesia Evaluation   Pt has had prior anesthetic.     No history of anesthetic complications       ROS/MED HX  ENT/Pulmonary:     (+) tobacco use, Past use,     Neurologic:  - neg neurologic ROS     Cardiovascular:  - neg cardiovascular ROS     METS/Exercise Tolerance: >4 METS    Hematologic:  - neg hematologic  ROS     Musculoskeletal:  - neg musculoskeletal ROS     GI/Hepatic:  - neg GI/hepatic ROS     Renal/Genitourinary:  - neg Renal ROS     Endo:  - neg endo ROS     Psychiatric/Substance Use:     (+) psychiatric history depression     Infectious Disease:  - neg infectious disease ROS     Malignancy:  - neg malignancy ROS     Other:      (+) Possibly pregnant, ,           Physical Exam    Airway        Mallampati: II "   TM distance: >3 FB   Neck ROM: full     Respiratory Devices and Support         Dental  no notable dental history         Cardiovascular   cardiovascular exam normal          Pulmonary   pulmonary exam normal                  Anesthesia Plan     History & Physical Review  History and physical reviewed and following examination; no interval change.    ASA Status:  2.   Plan for Epidural         PONV prophylaxis:  Ondansetron (or other 5HT-3), Dexamethasone or Solumedrol and Scopolamine patch.       Consents  Anesthesia Plan(s) and associated risks, benefits, and realistic alternatives discussed.    Questions answered and patient/representative(s) expressed understanding.    Discussed with:  Patient, Patient and Spouse.             Postoperative Care  Postoperative pain management: IV analgesics and Oral pain medications.        neg OB ROS.

## 2021-01-21 NOTE — PLAN OF CARE
Stable postpartum, VSS, voiding in good amounts. Pain controlled with Ibuprofen for cramping. Lochia rubra WNL, no clots noted. Bonding with , comfortable with  cares. Plan for tubal tomorrow morning. Discussed with patient NPO after midnight. Verbalized understanding.

## 2021-01-21 NOTE — PLAN OF CARE
Patient up with assistance of  nurse and Yany reddy,voided. Transferred to room 2044, oriented to room.

## 2021-01-21 NOTE — PLAN OF CARE
Vaginal delivery of viable male at 0907. Sandrine urethral tear with repair ice pack applied. Pitocin running.Skin to skin with infant. Anticipate normal postpartum recovery.

## 2021-01-21 NOTE — TELEPHONE ENCOUNTER
"Triage Call:    -Patient calling who is 38w5d.  -Estimated Date of Delivery: Jan 29, 2021  -Patient saw Varsha Calle OB/GYN yesterday for office visit. Per OB/GYN  -Patient is COVID-19 negative.   -Plans to deliver [facility]  -Patient is having active labor sx currently. Patient has been feeling uncomfortable within the last two hours.   -Contractions are every 2-3 minutes apart and last 15-25 seconds each.   -Contractions have been present all day, but have been more frequent in the last hour.     -Per protocol, recommendations are for patient to go to ED now/ Labor and Delivery now.  -RN spoke with charge YADI Flores with labor and delivery.   -Sandra stated patient can go through Emergency room and can have 1 support person, must wear masks. Sandra stated that will be getting patients bed ready.   -RN informed patient of the above. Patient will be going to ED now,  will be driving her.      Raven Jones RN, BSN Nurse Triage Advisor 6:47 PM 1/20/2021       Reason for Disposition    [1] History of prior delivery (multipara) AND [2] contractions < 10 minutes apart AND [3] present 1 hour    Additional Information    Negative: Passed out (i.e., lost consciousness, collapsed and was not responding)    Negative: Shock suspected (e.g., cold/pale/clammy skin, too weak to stand, low BP, rapid pulse)    Negative: Difficult to awaken or acting confused (e.g., disoriented, slurred speech)    Negative: [1] SEVERE abdominal pain (e.g., excruciating) AND [2] constant AND [3] present > 1 hour    Negative: Severe bleeding (e.g., continuous red blood from vagina, or large blood clots)    Negative: Umbilical cord hanging out of the vagina (shiny, white, curled appearance, \"like telephone cord\")    Negative: Uncontrollable urge to push (i.e., feels like baby is coming out now)    Negative: Can see baby    Negative: Sounds like a life-threatening emergency to the triager    Negative: [1] First baby (primipara) AND [2] " contractions < 6 minutes apart  AND [3] present 2 hours    Protocols used: PREGNANCY - LABOR-A-AH    COVID 19 Nurse Triage Plan/Patient Instructions    Please be aware that novel coronavirus (COVID-19) may be circulating in the community. If you develop symptoms such as fever, cough, or SOB or if you have concerns about the presence of another infection including coronavirus (COVID-19), please contact your health care provider or visit [email or web address].     Disposition/Instructions    ED Visit recommended. Follow protocol based instructions.     Bring Your Own Device:  Please also bring your smart device(s) (smart phones, tablets, laptops) and their charging cables for your personal use and to communicate with your care team during your visit.    Thank you for taking steps to prevent the spread of this virus.  o Limit your contact with others.  o Wear a simple mask to cover your cough.  o Wash your hands well and often.    Resources    [facility]: About COVID-19: [email or web address]    CDC: What to Do If You're Sick: www.cdc.gov/coronavirus/2019-ncov/about/steps-when-sick.html    CDC: Ending Home Isolation: www.cdc.gov/coronavirus/2019-ncov/hcp/disposition-in-home-patients.html     CDC: Caring for Someone: www.cdc.gov/coronavirus/2019-ncov/if-you-are-sick/care-for-someone.html     [facility]: Interim Guidance for Hospital Discharge to Home: www.health.[facility].[place]/diseases/coronavirus/hcp/hospdischarge.pdf    [facility] clinical trials (COVID-19 research studies): [email or web address]-clinical-trials     Below are the COVID-19 hotlines at the [place] Department of [facility] ([facility]). Interpreters are available.   o For health questions: Call 865-497-3149 or 1-238.108.1313 (7 a.m. to 7 p.m.)  o For questions about schools and childcare: Call 675-106-4562 or 1-531.862.7902 (7 a.m. to 7 p.m.)

## 2021-01-22 ENCOUNTER — ANESTHESIA EVENT (OUTPATIENT)
Dept: SURGERY | Facility: CLINIC | Age: 31
End: 2021-01-22
Payer: COMMERCIAL

## 2021-01-22 ENCOUNTER — ANESTHESIA (OUTPATIENT)
Dept: SURGERY | Facility: CLINIC | Age: 31
End: 2021-01-22
Payer: COMMERCIAL

## 2021-01-22 PROBLEM — Z34.83 PRENATAL CARE, SUBSEQUENT PREGNANCY IN THIRD TRIMESTER: Status: RESOLVED | Noted: 2021-01-20 | Resolved: 2021-01-22

## 2021-01-22 PROBLEM — Z34.90 PREGNANT: Status: RESOLVED | Noted: 2021-01-20 | Resolved: 2021-01-22

## 2021-01-22 PROBLEM — Z30.2 ENCOUNTER FOR STERILIZATION: Status: ACTIVE | Noted: 2020-07-22

## 2021-01-22 PROCEDURE — 360N000075 HC SURGERY LEVEL 2, PER MIN: Performed by: OBSTETRICS & GYNECOLOGY

## 2021-01-22 PROCEDURE — 258N000003 HC RX IP 258 OP 636: Performed by: NURSE ANESTHETIST, CERTIFIED REGISTERED

## 2021-01-22 PROCEDURE — 58605 DIVISION OF FALLOPIAN TUBE: CPT | Performed by: OBSTETRICS & GYNECOLOGY

## 2021-01-22 PROCEDURE — 999N000141 HC STATISTIC PRE-PROCEDURE NURSING ASSESSMENT: Performed by: OBSTETRICS & GYNECOLOGY

## 2021-01-22 PROCEDURE — 272N000001 HC OR GENERAL SUPPLY STERILE: Performed by: OBSTETRICS & GYNECOLOGY

## 2021-01-22 PROCEDURE — 250N000013 HC RX MED GY IP 250 OP 250 PS 637: Performed by: OBSTETRICS & GYNECOLOGY

## 2021-01-22 PROCEDURE — 250N000009 HC RX 250: Performed by: NURSE ANESTHETIST, CERTIFIED REGISTERED

## 2021-01-22 PROCEDURE — 710N000009 HC RECOVERY PHASE 1, LEVEL 1, PER MIN: Performed by: OBSTETRICS & GYNECOLOGY

## 2021-01-22 PROCEDURE — 250N000013 HC RX MED GY IP 250 OP 250 PS 637: Performed by: NURSE ANESTHETIST, CERTIFIED REGISTERED

## 2021-01-22 PROCEDURE — 250N000011 HC RX IP 250 OP 636: Performed by: NURSE ANESTHETIST, CERTIFIED REGISTERED

## 2021-01-22 PROCEDURE — 88304 TISSUE EXAM BY PATHOLOGIST: CPT | Mod: TC | Performed by: OBSTETRICS & GYNECOLOGY

## 2021-01-22 PROCEDURE — 0UB70ZZ EXCISION OF BILATERAL FALLOPIAN TUBES, OPEN APPROACH: ICD-10-PCS | Performed by: OBSTETRICS & GYNECOLOGY

## 2021-01-22 PROCEDURE — 88302 TISSUE EXAM BY PATHOLOGIST: CPT | Mod: 26 | Performed by: PATHOLOGY

## 2021-01-22 PROCEDURE — 120N000001 HC R&B MED SURG/OB

## 2021-01-22 PROCEDURE — 370N000017 HC ANESTHESIA TECHNICAL FEE, PER MIN: Performed by: OBSTETRICS & GYNECOLOGY

## 2021-01-22 PROCEDURE — 250N000025 HC SEVOFLURANE, PER MIN: Performed by: OBSTETRICS & GYNECOLOGY

## 2021-01-22 PROCEDURE — 250N000011 HC RX IP 250 OP 636: Performed by: OBSTETRICS & GYNECOLOGY

## 2021-01-22 RX ORDER — NALOXONE HYDROCHLORIDE 0.4 MG/ML
0.2 INJECTION, SOLUTION INTRAMUSCULAR; INTRAVENOUS; SUBCUTANEOUS
Status: DISCONTINUED | OUTPATIENT
Start: 2021-01-22 | End: 2021-01-22 | Stop reason: HOSPADM

## 2021-01-22 RX ORDER — IBUPROFEN 600 MG/1
600 TABLET, FILM COATED ORAL EVERY 6 HOURS PRN
Status: DISCONTINUED | OUTPATIENT
Start: 2021-01-22 | End: 2021-01-23 | Stop reason: HOSPADM

## 2021-01-22 RX ORDER — ALBUTEROL SULFATE 0.83 MG/ML
2.5 SOLUTION RESPIRATORY (INHALATION) EVERY 4 HOURS PRN
Status: DISCONTINUED | OUTPATIENT
Start: 2021-01-22 | End: 2021-01-22 | Stop reason: HOSPADM

## 2021-01-22 RX ORDER — BUPIVACAINE HYDROCHLORIDE 2.5 MG/ML
INJECTION, SOLUTION INFILTRATION; PERINEURAL PRN
Status: DISCONTINUED | OUTPATIENT
Start: 2021-01-22 | End: 2021-01-22 | Stop reason: HOSPADM

## 2021-01-22 RX ORDER — ONDANSETRON 2 MG/ML
4 INJECTION INTRAMUSCULAR; INTRAVENOUS EVERY 30 MIN PRN
Status: DISCONTINUED | OUTPATIENT
Start: 2021-01-22 | End: 2021-01-22 | Stop reason: HOSPADM

## 2021-01-22 RX ORDER — SODIUM CHLORIDE, SODIUM LACTATE, POTASSIUM CHLORIDE, CALCIUM CHLORIDE 600; 310; 30; 20 MG/100ML; MG/100ML; MG/100ML; MG/100ML
INJECTION, SOLUTION INTRAVENOUS CONTINUOUS
Status: DISCONTINUED | OUTPATIENT
Start: 2021-01-22 | End: 2021-01-22 | Stop reason: HOSPADM

## 2021-01-22 RX ORDER — OXYCODONE HYDROCHLORIDE 5 MG/1
5 TABLET ORAL EVERY 6 HOURS PRN
Status: DISCONTINUED | OUTPATIENT
Start: 2021-01-22 | End: 2021-01-23 | Stop reason: HOSPADM

## 2021-01-22 RX ORDER — NALOXONE HYDROCHLORIDE 0.4 MG/ML
0.4 INJECTION, SOLUTION INTRAMUSCULAR; INTRAVENOUS; SUBCUTANEOUS
Status: DISCONTINUED | OUTPATIENT
Start: 2021-01-22 | End: 2021-01-22 | Stop reason: HOSPADM

## 2021-01-22 RX ORDER — DIMENHYDRINATE 50 MG/ML
25 INJECTION, SOLUTION INTRAMUSCULAR; INTRAVENOUS
Status: DISCONTINUED | OUTPATIENT
Start: 2021-01-22 | End: 2021-01-22 | Stop reason: HOSPADM

## 2021-01-22 RX ORDER — GABAPENTIN 300 MG/1
300 CAPSULE ORAL ONCE
Status: COMPLETED | OUTPATIENT
Start: 2021-01-22 | End: 2021-01-22

## 2021-01-22 RX ORDER — PROPOFOL 10 MG/ML
INJECTION, EMULSION INTRAVENOUS PRN
Status: DISCONTINUED | OUTPATIENT
Start: 2021-01-22 | End: 2021-01-22

## 2021-01-22 RX ORDER — FENTANYL CITRATE 50 UG/ML
25-50 INJECTION, SOLUTION INTRAMUSCULAR; INTRAVENOUS
Status: DISCONTINUED | OUTPATIENT
Start: 2021-01-22 | End: 2021-01-22 | Stop reason: HOSPADM

## 2021-01-22 RX ORDER — FENTANYL CITRATE 50 UG/ML
25-50 INJECTION, SOLUTION INTRAMUSCULAR; INTRAVENOUS
Status: DISCONTINUED | OUTPATIENT
Start: 2021-01-22 | End: 2021-01-23 | Stop reason: HOSPADM

## 2021-01-22 RX ORDER — HYDROMORPHONE HYDROCHLORIDE 1 MG/ML
.3-.5 INJECTION, SOLUTION INTRAMUSCULAR; INTRAVENOUS; SUBCUTANEOUS EVERY 10 MIN PRN
Status: DISCONTINUED | OUTPATIENT
Start: 2021-01-22 | End: 2021-01-22 | Stop reason: HOSPADM

## 2021-01-22 RX ORDER — KETOROLAC TROMETHAMINE 30 MG/ML
30 INJECTION, SOLUTION INTRAMUSCULAR; INTRAVENOUS ONCE
Status: COMPLETED | OUTPATIENT
Start: 2021-01-22 | End: 2021-01-22

## 2021-01-22 RX ORDER — LIDOCAINE 40 MG/G
CREAM TOPICAL
Status: DISCONTINUED | OUTPATIENT
Start: 2021-01-22 | End: 2021-01-22 | Stop reason: HOSPADM

## 2021-01-22 RX ORDER — ONDANSETRON 2 MG/ML
INJECTION INTRAMUSCULAR; INTRAVENOUS PRN
Status: DISCONTINUED | OUTPATIENT
Start: 2021-01-22 | End: 2021-01-22

## 2021-01-22 RX ORDER — NALOXONE HYDROCHLORIDE 0.4 MG/ML
0.2 INJECTION, SOLUTION INTRAMUSCULAR; INTRAVENOUS; SUBCUTANEOUS
Status: DISCONTINUED | OUTPATIENT
Start: 2021-01-22 | End: 2021-01-23 | Stop reason: HOSPADM

## 2021-01-22 RX ORDER — FENTANYL CITRATE 50 UG/ML
INJECTION, SOLUTION INTRAMUSCULAR; INTRAVENOUS PRN
Status: DISCONTINUED | OUTPATIENT
Start: 2021-01-22 | End: 2021-01-22

## 2021-01-22 RX ORDER — NALOXONE HYDROCHLORIDE 0.4 MG/ML
0.4 INJECTION, SOLUTION INTRAMUSCULAR; INTRAVENOUS; SUBCUTANEOUS
Status: DISCONTINUED | OUTPATIENT
Start: 2021-01-22 | End: 2021-01-23 | Stop reason: HOSPADM

## 2021-01-22 RX ORDER — PROPOFOL 10 MG/ML
INJECTION, EMULSION INTRAVENOUS CONTINUOUS PRN
Status: DISCONTINUED | OUTPATIENT
Start: 2021-01-22 | End: 2021-01-22

## 2021-01-22 RX ORDER — ACETAMINOPHEN 325 MG/1
975 TABLET ORAL ONCE
Status: DISCONTINUED | OUTPATIENT
Start: 2021-01-22 | End: 2021-01-22 | Stop reason: HOSPADM

## 2021-01-22 RX ORDER — ONDANSETRON 4 MG/1
4 TABLET, ORALLY DISINTEGRATING ORAL EVERY 30 MIN PRN
Status: DISCONTINUED | OUTPATIENT
Start: 2021-01-22 | End: 2021-01-22 | Stop reason: HOSPADM

## 2021-01-22 RX ORDER — DEXAMETHASONE SODIUM PHOSPHATE 4 MG/ML
INJECTION, SOLUTION INTRA-ARTICULAR; INTRALESIONAL; INTRAMUSCULAR; INTRAVENOUS; SOFT TISSUE PRN
Status: DISCONTINUED | OUTPATIENT
Start: 2021-01-22 | End: 2021-01-22

## 2021-01-22 RX ORDER — HYDRALAZINE HYDROCHLORIDE 20 MG/ML
2.5-5 INJECTION INTRAMUSCULAR; INTRAVENOUS EVERY 10 MIN PRN
Status: DISCONTINUED | OUTPATIENT
Start: 2021-01-22 | End: 2021-01-22 | Stop reason: HOSPADM

## 2021-01-22 RX ADMIN — IBUPROFEN 600 MG: 600 TABLET, FILM COATED ORAL at 16:49

## 2021-01-22 RX ADMIN — ACETAMINOPHEN 650 MG: 325 TABLET, FILM COATED ORAL at 21:26

## 2021-01-22 RX ADMIN — OXYCODONE HYDROCHLORIDE 5 MG: 5 TABLET ORAL at 19:06

## 2021-01-22 RX ADMIN — PROPOFOL 40 MG: 10 INJECTION, EMULSION INTRAVENOUS at 11:40

## 2021-01-22 RX ADMIN — ROCURONIUM BROMIDE 50 MG: 10 INJECTION INTRAVENOUS at 11:19

## 2021-01-22 RX ADMIN — PROPOFOL 250 MG: 10 INJECTION, EMULSION INTRAVENOUS at 11:19

## 2021-01-22 RX ADMIN — FENTANYL CITRATE 150 MCG: 50 INJECTION, SOLUTION INTRAMUSCULAR; INTRAVENOUS at 11:19

## 2021-01-22 RX ADMIN — DOCUSATE SODIUM AND SENNOSIDES 1 TABLET: 8.6; 5 TABLET ORAL at 19:44

## 2021-01-22 RX ADMIN — GABAPENTIN 300 MG: 300 CAPSULE ORAL at 09:48

## 2021-01-22 RX ADMIN — KETOROLAC TROMETHAMINE 30 MG: 30 INJECTION, SOLUTION INTRAMUSCULAR at 10:49

## 2021-01-22 RX ADMIN — PROPOFOL 200 MCG/KG/MIN: 10 INJECTION, EMULSION INTRAVENOUS at 11:25

## 2021-01-22 RX ADMIN — SODIUM CHLORIDE, POTASSIUM CHLORIDE, SODIUM LACTATE AND CALCIUM CHLORIDE: 600; 310; 30; 20 INJECTION, SOLUTION INTRAVENOUS at 09:53

## 2021-01-22 RX ADMIN — ACETAMINOPHEN 650 MG: 325 TABLET, FILM COATED ORAL at 16:49

## 2021-01-22 RX ADMIN — ESCITALOPRAM OXALATE 20 MG: 20 TABLET ORAL at 09:48

## 2021-01-22 RX ADMIN — SUGAMMADEX 200 MG: 100 INJECTION, SOLUTION INTRAVENOUS at 11:40

## 2021-01-22 RX ADMIN — IBUPROFEN 800 MG: 800 TABLET ORAL at 23:14

## 2021-01-22 RX ADMIN — ACETAMINOPHEN 975 MG: 325 TABLET, FILM COATED ORAL at 09:48

## 2021-01-22 RX ADMIN — FENTANYL CITRATE 50 MCG: 50 INJECTION, SOLUTION INTRAMUSCULAR; INTRAVENOUS at 12:18

## 2021-01-22 RX ADMIN — FENTANYL CITRATE 100 MCG: 50 INJECTION, SOLUTION INTRAMUSCULAR; INTRAVENOUS at 11:15

## 2021-01-22 RX ADMIN — ONDANSETRON 4 MG: 2 INJECTION INTRAMUSCULAR; INTRAVENOUS at 11:19

## 2021-01-22 RX ADMIN — DEXAMETHASONE SODIUM PHOSPHATE 10 MG: 4 INJECTION, SOLUTION INTRA-ARTICULAR; INTRALESIONAL; INTRAMUSCULAR; INTRAVENOUS; SOFT TISSUE at 11:19

## 2021-01-22 ASSESSMENT — LIFESTYLE VARIABLES: TOBACCO_USE: 1

## 2021-01-22 NOTE — ANESTHESIA POSTPROCEDURE EVALUATION
Patient: Lanie Martin    Procedure(s):  LIGATION, FALLOPIAN TUBE, POSTPARTUM    Diagnosis:Encounter for sterilization [Z30.2]  Diagnosis Additional Information: No value filed.    Anesthesia Type:  General    Note:  Disposition: Inpatient   Postop Pain Control: Uneventful            Sign Out: Well controlled pain   PONV: No   Neuro/Psych: Uneventful            Sign Out: Acceptable/Baseline neuro status   Airway/Respiratory: Uneventful            Sign Out: Acceptable/Baseline resp. status   CV/Hemodynamics: Uneventful            Sign Out: Acceptable CV status   Other NRE: NONE   DID A NON-ROUTINE EVENT OCCUR? No         Last vitals:  Vitals:    01/22/21 0020 01/22/21 0915 01/22/21 1200   BP:  116/69 96/63   Pulse:  86    Resp: 16 16 16   Temp:  36.6  C (97.9  F) 36.8  C (98.3  F)   SpO2:   96%       Electronically Signed By: GAMALIEL Monaco CRNA  January 22, 2021  12:13 PM

## 2021-01-22 NOTE — OP NOTE
[facility] Operative Note    Patient Name: Lanie Martin  MRN:[id number]  : 1990  Date of Surgery: 21   Pre-operative diagnosis:  Desires permanent sterilization   Post-operative diagnosis:  Same   Procedure:  Procedure(s):  LIGATION, FALLOPIAN TUBE, POSTPARTUM   Surgeon:  Dr. Mellissa Torrez (OB/GYN Attending Staff)    Assistant(s):  None   Anesthesia:  General   Estimated blood loss:  5mL   Total IV fluids:  800mL crystaloid    Blood transfusion:  No transfusion was given during surgery    Total urine output:  Voided prior to procedure    Drains:  None   Specimens:  ID Type Source Tests Collected by Time Destination   A :  Organ Fallopian Tube, Bilateral SURGICAL PATHOLOGY EXAM Mellissa Torrez,  2021 11:39 AM       Indication:  Patient is a 30 year old  who desired permanent sterilization via postpartum tubal ligation.  She was counseled regarding the risks and alternatives of the procedure including intra-abdominal injury, bleeding and infection.  I also discussed alternative forms of contraception which were as efficacious as a tubal ligation but yet reversible. I counseled her on the relative risks of ectopic pregnancy after tubal ligation.  The patient agreed to proceed, and signed written informed consent.      Complications:  None apparent   Condition:  Stable, transferred to PACU        Findings: Fundus was noted to be at the umbilicus.  Bilateral fallopian tubes were each identified via the fimbriae.  Both ovaries were visualized and noted to be normal.  Hemostatic surgical site at the end the case.   Procedure:  The patient was taken to the operating room where general anesthesia was administered. She was placed in the dorsal position, then prepped and draped in the usual sterile fashion. Attention was then turned to the abdomen where a 3cm horizontal incision was made along the inferior umbilicus. This incision was carried through the  subcutaneous tissue to the fascia.  The fascia was grasped with hemostat clamps and incised with Edwards scissors.  The peritoneum was grasped with Shanti clamps and opened with scissors.  The patient was then airplaned and the left fallopian tube was identified, grasped with [place] clamps and walked out to the fimbriated ends.  The fallopian tube was elevated and mesosalpinx was clamped, cauterized and transected using the hand-held LigaSure device. This dissection was carried to the level of the cornua to amputate the fallopian tube in it's entirety. These steps were repeated on the right fallopian tube.  The mesosalpinx was examined on both sides and noted to be hemostatic.  The fascia was then closed with 0 Vicryl suture. Subcutaneous tissues were closed using #3-0 plain gut and the skin was closed with 4-0 Vicryl and Dermabond.      At the completion of the procedure all sponge, lap, and needle counts were correct x2.  Prior to the start of the procedure a formal timeout was performed identifying correct patient and procedure.  At the conclusion of the procedure a debrief was performed.  The patient was awoken from anesthesia and extubated without difficulty. She was sent to recovery in stable condition.     Mellissa Torrez DO

## 2021-01-22 NOTE — ASSESSMENT & PLAN NOTE
PPD#2 s/p   Routine post partum care  Perineal care  Encourage ambulation  Lactation support  Anticipate discharge home today

## 2021-01-22 NOTE — ANESTHESIA CARE TRANSFER NOTE
Patient: Lanie Martin    Procedure(s):  LIGATION, FALLOPIAN TUBE, POSTPARTUM    Diagnosis: Encounter for sterilization [Z30.2]  Diagnosis Additional Information: No value filed.    Anesthesia Type:   General     Note:    Oropharynx: oropharynx clear of all foreign objects  Level of Consciousness: awake  Oxygen Supplementation: room air    Independent Airway: airway patency satisfactory and stable  Dentition: dentition unchanged  Vital Signs Stable: post-procedure vital signs reviewed and stable  Report to RN Given: handoff report given  Patient transferred to: PACU    Handoff Report: Identifed the Patient, Identified the Reponsible Provider, Reviewed the pertinent medical history, Discussed the surgical course, Reviewed Intra-OP anesthesia mangement and issues during anesthesia, Set expectations for post-procedure period and Allowed opportunity for questions and acknowledgement of understanding      Vitals: (Last set prior to Anesthesia Care Transfer)  CRNA VITALS  1/22/2021 1129 - 1/22/2021 1212      1/22/2021             Resp Rate (observed):  (!) 4        Electronically Signed By: GAMALIEL Monaco CRNA  January 22, 2021  12:12 PM

## 2021-01-22 NOTE — PROGRESS NOTES
[facility] OB/GYN Department    Post-Partum Progress Note: PPD #1    Name: Lanie Martin  Date: 1/22/2021    Subjective:   Patient seen and examined.  No complaints.  Pain well controlled on PO meds.  NPO this morning for procedure (PPTL), no nausea or vomiting.  Ambulating and voiding without difficulty.  Lochia moderate.  Breast feeding.     ROS:    General/Constitutional:  Denies change in appetite, chills, or fever  Respiratory: Denies shortness of breath, cough, wheezing   Cardiovascular: Denies chest pain, exertional pain, irregular heartbeat  Gastrointestinal:  +mild uterine cramping, no constipation, nausea, or vomiting  Genitourinary: Denies hematuria, difficulty urinating, frequency, or dysuria   Musculoskeletal: Denies aching muscles or joints, no peripheral edema  Neurologic: Denies dizziness, numbness, convulsions, tremors, or confusion      Objective:     Intake/Output Summary (Last 24 hours) at 1/22/2021 0857  Last data filed at 1/21/2021 1215  Gross per 24 hour   Intake --   Output 201 ml   Net -201 ml       Patient Vitals for the past 24 hrs:   BP Temp Temp src Pulse Resp SpO2   01/22/21 0020 -- -- -- -- 16 --   01/21/21 2327 108/63 97.5  F (36.4  C) Oral 88 16 98 %   01/21/21 1540 108/62 98  F (36.7  C) Oral 83 16 --   01/21/21 1115 110/58 -- -- -- -- --   01/21/21 1111 110/58 -- -- 90 18 --   01/21/21 1100 119/78 -- -- 97 18 --   01/21/21 1045 114/63 -- -- 89 18 --   01/21/21 1031 -- -- -- 100 16 --   01/21/21 1000 109/63 -- -- 92 16 --   01/21/21 0945 108/58 -- -- 93 16 --   01/21/21 0930 118/70 -- -- -- -- --   01/21/21 0915 118/70 -- -- 108 -- --       No results found.    General appearance: well-hydrated, A&O x 3, no apparent distress  ENT: EOMI, sclera anicteric   Lungs: Equal expansion bilaterally, no accessory muscle use  Heart: No heaves or thrills. No peripheral varicosities  Constitutional: See vitals  Abdomen: Soft, non-distended, no rebound or rigidity   Uterus: Firm  at umbilicus with non-tender fundus   Neurologic: CN II-XII grossly intact, no lateralizing defects, no gross movement abnormalities  Extremities: no edema, no calf tenderness    Assessment and Plan:     (spontaneous vaginal delivery)  PPD#1 s/p   Routine post partum care  Perineal care  Encourage ambulation  NPO this morning for PPTL  Lactation support  Anticipate discharge home tomorrow    Encounter for sterilization  Patient desires permanent sterilization, discussed that this procedure is not reversible.  Discussed procedure with patient, verbal and written consent obtained for minilaparotomy with bilateral salpingectomy  To OR today at 11am  NPO preoperatively     Mellissa Torrez DO

## 2021-01-22 NOTE — PLAN OF CARE
Problem: Adult Inpatient Plan of Care  Goal: Plan of Care Review  Outcome: Improving     Data: Vital signs within normal limits. Postpartum checks within normal limits - see flow record. Patient  Is tolerating po intake.NPO since midnight, allowed water until 0900 this am due to scheduled pptl at 1100.  Patient is able to empty bladder independently. . Patient ambulating independently..   No apparent signs of infection. perineum healing well. Patient Is performing self cares and Is able to care for infant. Positive attachment behaviors are observed with infant. Support persons are present.  Action:  Pain plan was discussed. Patient will request pain med when she is ready for it. See MAR.Patient education done about breastfeeding,  cares, postpartum cares, pain management/plan, and post op care. See flow record.  Response:   Patient voices understanding. .   Plan: Anticipate discharge on 21.    Report called to Nimo GRULLON, SDS

## 2021-01-22 NOTE — PLAN OF CARE
"VSS, afebrile. Pt is up and independent with minimal incisional pain. Tolerating regular diet and po fluids. Voiding spontaneously. States she voided at 1445 but \"missed the hat\" to measure. Able to care for herself and infant. Call light in reach, enc to call for needs.   "

## 2021-01-22 NOTE — BRIEF OP NOTE
[facility]     Brief Operative Note    Pre-operative diagnosis: Encounter for sterilization [Z30.2]  Post-operative diagnosis S/p bilateral salpingectomy    Procedure: Procedure(s):  LIGATION, FALLOPIAN TUBE, POSTPARTUM  Surgeon: Surgeon(s) and Role:     * Mellissa Torrez DO - Primary  Anesthesia: General   Estimated blood loss: 5cc  Drains: None  Specimens:   ID Type Source Tests Collected by Time Destination   A :  Organ Fallopian Tube, Bilateral SURGICAL PATHOLOGY EXAM Mellissa Torrez DO 1/22/2021 11:39 AM      Findings:   Normal appearing bilateral fallopian tubes.  Complications: None.  Implants: * No implants in log *      Mellissa Torrez DO

## 2021-01-22 NOTE — PROGRESS NOTES
Data: Vital signs within normal limits. Postpartum checks within normal limits - see flow record. Patient  is tolerating po intake. Patient is able to empty bladder independently. . Patient ambulating independently..   No apparent signs of infection. Lac 1st degree healing well. Patient is performing self cares and is able to care for infant. Positive attachment behaviors are observed with infant. Support persons are present.  Action:  Pain plan was discussed. Patient will request pain med when she is ready for it. Patient was medicated during the shift for cramping. See MAR.Patient education done about breastfeeding,  cares, postpartum cares,  safety and rest. See flow record.  Response:   Patient reassessed within 1 hour after each medication for pain. Patient stated that pain had improved. Patient stated that she was comfortable. .   Plan: Anticipate discharge on  or .

## 2021-01-22 NOTE — ANESTHESIA PREPROCEDURE EVALUATION
Anesthesia Pre-Procedure Evaluation    Patient: Lanie Martin   MRN: [id number] : 1990        Preoperative Diagnosis: Encounter for sterilization [Z30.2]   Procedure : Procedure(s):  LIGATION, FALLOPIAN TUBE, POSTPARTUM     Past Medical History:   Diagnosis Date     Carpal tunnel syndrome     during pregnancy     Chickenpox      Depression       Past Surgical History:   Procedure Laterality Date     AS CREATE EARDRUM OPENING,GEN ANESTH       MOUTH SURGERY      wisdom teeth      Allergies   Allergen Reactions     Tylenol W/Codeine [Acetaminophen-Codeine] Other (See Comments)     Causes lower back pain      Social History     Tobacco Use     Smoking status: Former Smoker     Smokeless tobacco: Never Used     Tobacco comment: quit 5-6 year ago   Substance Use Topics     Alcohol use: Not Currently     Comment: occas- quit with pregnancy      Wt Readings from Last 1 Encounters:   21 70.8 kg (156 lb)        Anesthesia Evaluation   Pt has had prior anesthetic.         ROS/MED HX  ENT/Pulmonary:     (+) tobacco use, Past use,     Neurologic:  - neg neurologic ROS     Cardiovascular:  - neg cardiovascular ROS     METS/Exercise Tolerance:     Hematologic:  - neg hematologic  ROS     Musculoskeletal: Comment: CTS      GI/Hepatic:  - neg GI/hepatic ROS     Renal/Genitourinary:  - neg Renal ROS     Endo:  - neg endo ROS     Psychiatric/Substance Use:     (+) psychiatric history depression     Infectious Disease:  - neg infectious disease ROS     Malignancy:  - neg malignancy ROS     Other: Comment: Undesired fertility           Physical Exam    Airway        Mallampati: I   TM distance: > 3 FB   Neck ROM: full   Mouth opening: > 3 cm    Respiratory Devices and Support     Nasal Canula      Dental  no notable dental history         Cardiovascular   cardiovascular exam normal          Pulmonary   pulmonary exam normal                OUTSIDE LABS:  CBC:   Lab Results   Component Value Date    WBC 13.3 (H)  01/20/2021    WBC 10.5 10/12/2020    HGB 12.3 01/20/2021    HGB 11.6 (L) 10/12/2020    HCT 35.3 01/20/2021    HCT 34.7 (L) 10/12/2020     01/20/2021     10/12/2020     BMP:   Lab Results   Component Value Date     05/28/2020    POTASSIUM 3.9 05/28/2020    CHLORIDE 109 05/28/2020    CO2 28 05/28/2020    BUN 11 05/28/2020    CR 0.62 05/28/2020    GLC 88 05/28/2020     COAGS: No results found for: PTT, INR, FIBR  POC:   Lab Results   Component Value Date    HCG Positive (A) 05/28/2020     HEPATIC:   Lab Results   Component Value Date    ALBUMIN 4.1 05/28/2020    PROTTOTAL 7.4 05/28/2020    ALT 20 05/28/2020    AST 13 05/28/2020    ALKPHOS 50 05/28/2020    BILITOTAL 0.2 05/28/2020     OTHER:   Lab Results   Component Value Date    PEPPER 8.4 (L) 05/28/2020    LIPASE 135 05/28/2020       Anesthesia Plan     History & Physical Review      ASA Status:  2.   Plan for General with Intravenous induction. Device: ETT Maintenance will be TIVA.     Additional equipment: Videolaryngoscope.    PONV prophylaxis:  Ondansetron (or other 5HT-3) and Dexamethasone or Solumedrol.       Consents  Anesthesia Plan(s) and associated risks, benefits, and realistic alternatives discussed.    Questions answered and patient/representative(s) expressed understanding.    Discussed with:  Patient.       Extended Intubation/Ventilatory Support Discussed No No     Use of blood products discussed: No.          Postoperative Care  Postoperative pain management: IV analgesics, Oral pain medications and Multi-modal analgesia.           GAMALIEL Griggs CRNA

## 2021-01-23 VITALS
BODY MASS INDEX: 26.5 KG/M2 | DIASTOLIC BLOOD PRESSURE: 71 MMHG | SYSTOLIC BLOOD PRESSURE: 111 MMHG | WEIGHT: 144.9 LBS | OXYGEN SATURATION: 98 % | HEART RATE: 79 BPM | TEMPERATURE: 98.2 F | RESPIRATION RATE: 16 BRPM

## 2021-01-23 PROCEDURE — 90686 IIV4 VACC NO PRSV 0.5 ML IM: CPT | Performed by: OBSTETRICS & GYNECOLOGY

## 2021-01-23 PROCEDURE — G0008 ADMIN INFLUENZA VIRUS VAC: HCPCS | Performed by: OBSTETRICS & GYNECOLOGY

## 2021-01-23 PROCEDURE — 250N000013 HC RX MED GY IP 250 OP 250 PS 637: Performed by: OBSTETRICS & GYNECOLOGY

## 2021-01-23 PROCEDURE — 250N000011 HC RX IP 250 OP 636: Performed by: OBSTETRICS & GYNECOLOGY

## 2021-01-23 RX ORDER — OXYCODONE HYDROCHLORIDE 5 MG/1
5 TABLET ORAL EVERY 6 HOURS PRN
Qty: 8 TABLET | Refills: 0 | Status: SHIPPED | OUTPATIENT
Start: 2021-01-23 | End: 2021-03-05

## 2021-01-23 RX ORDER — AMOXICILLIN 250 MG
1 CAPSULE ORAL 2 TIMES DAILY PRN
Qty: 15 TABLET | Refills: 0 | Status: SHIPPED | OUTPATIENT
Start: 2021-01-23 | End: 2022-05-10

## 2021-01-23 RX ORDER — IBUPROFEN 600 MG/1
600 TABLET, FILM COATED ORAL EVERY 6 HOURS PRN
Qty: 30 TABLET | Refills: 1 | Status: SHIPPED | OUTPATIENT
Start: 2021-01-23 | End: 2022-05-10

## 2021-01-23 RX ADMIN — ACETAMINOPHEN 650 MG: 325 TABLET, FILM COATED ORAL at 01:14

## 2021-01-23 RX ADMIN — IBUPROFEN 600 MG: 600 TABLET, FILM COATED ORAL at 05:12

## 2021-01-23 RX ADMIN — ESCITALOPRAM OXALATE 20 MG: 20 TABLET ORAL at 08:52

## 2021-01-23 RX ADMIN — DOCUSATE SODIUM AND SENNOSIDES 1 TABLET: 8.6; 5 TABLET ORAL at 08:51

## 2021-01-23 RX ADMIN — ACETAMINOPHEN 650 MG: 325 TABLET, FILM COATED ORAL at 09:18

## 2021-01-23 RX ADMIN — INFLUENZA A VIRUS A/GUANGDONG-MAONAN/SWL1536/2019 CNIC-1909 (H1N1) ANTIGEN (FORMALDEHYDE INACTIVATED), INFLUENZA A VIRUS A/HONG KONG/2671/2019 (H3N2) ANTIGEN (FORMALDEHYDE INACTIVATED), INFLUENZA B VIRUS B/PHUKET/3073/2013 ANTIGEN (FORMALDEHYDE INACTIVATED), AND INFLUENZA B VIRUS B/WASHINGTON/02/2019 ANTIGEN (FORMALDEHYDE INACTIVATED) 0.5 ML: 15; 15; 15; 15 INJECTION, SUSPENSION INTRAMUSCULAR at 09:58

## 2021-01-23 RX ADMIN — OXYCODONE HYDROCHLORIDE 5 MG: 5 TABLET ORAL at 08:51

## 2021-01-23 RX ADMIN — IBUPROFEN 800 MG: 800 TABLET ORAL at 12:08

## 2021-01-23 RX ADMIN — ACETAMINOPHEN 650 MG: 325 TABLET, FILM COATED ORAL at 05:12

## 2021-01-23 RX ADMIN — OXYCODONE HYDROCHLORIDE 5 MG: 5 TABLET ORAL at 01:14

## 2021-01-23 NOTE — PLAN OF CARE
Data: Vital signs within normal limits. Postpartum checks within normal limits - see flow record. Patient  Is tolerating po intake. Patient is able to empty bladder independently.  Patient ambulating independently. No apparent signs of infection.  Patient Is performing self cares and Is able to care for infant. Positive attachment behaviors are observed with infant.  Action:  Patient would like pain meds to be brought in when they are due. Patient was medicated during the shift for pain and cramping. See MAR.Patient education done about discharge from hospital. See flow record.  Response:   Patient reassessed within 1 hour after each medication for pain. Patient stated that pain had improved. Patient stated that she was comfortable. .   Plan: Anticipate discharge on 1/23/21.

## 2021-01-23 NOTE — PLAN OF CARE
Problem: Adult Inpatient Plan of Care  Goal: Plan of Care Review  1/23/2021 1320 by Cristina Larose, RN  Outcome: Completed  Flowsheets (Taken 1/23/2021 1320)  Outcome Summary: discharge     Patient discharged per wheelchair with infant in car seat. Mother verified that her band matches her infant's band by comparing the infant's  band#.  Discharge instructions given. Encouraged to call for any problems, questions or concerns. RXs picked up at [facility].

## 2021-01-23 NOTE — PROGRESS NOTES
[facility] OB/GYN Department    Post-Partum Progress Note: PPD #2/POD#1 s/p PPTL    Name: Lanie Martin  Date: 1/23/2021    Subjective:   Patient seen and examined.  No complaints.  Pain better controlled after taking Oxycodone. Tolerating regular diet, without nausea or vomiting.  Ambulating and voiding without difficulty.  Lochia light.  Breast feeding. Feels ready to go home today.    ROS:    General/Constitutional:  Denies change in appetite, chills, or fever  Respiratory: Denies shortness of breath, cough, wheezing   Cardiovascular: Denies chest pain, exertional pain, irregular heartbeat  Gastrointestinal:  +mild uterine cramping, no constipation, nausea, or vomiting  Genitourinary: Denies hematuria, difficulty urinating, frequency, or dysuria   Musculoskeletal: Denies aching muscles or joints, no peripheral edema  Neurologic: Denies dizziness, numbness, convulsions, tremors, or confusion      Objective:     Intake/Output Summary (Last 24 hours) at 1/23/2021 0854  Last data filed at 1/22/2021 1800  Gross per 24 hour   Intake 1354 ml   Output 400 ml   Net 954 ml       Patient Vitals for the past 24 hrs:   BP Temp Temp src Pulse Resp SpO2   01/23/21 0000 105/67 98  F (36.7  C) Oral 66 16 96 %   01/22/21 1730 -- -- -- -- 16 --   01/22/21 1645 102/62 97.6  F (36.4  C) Oral 81 16 97 %   01/22/21 1430 108/62 -- -- 83 16 100 %   01/22/21 1330 114/77 -- -- 90 -- 100 %   01/22/21 1300 109/75 97.6  F (36.4  C) Oral 78 16 100 %   01/22/21 1230 107/69 -- -- 77 10 93 %   01/22/21 1215 103/76 -- -- 84 11 94 %   01/22/21 1200 96/63 98.3  F (36.8  C) Axillary -- 16 96 %       No results found.    General appearance: well-hydrated, A&O x 3, no apparent distress  ENT: EOMI, sclera anicteric   Lungs: Equal expansion bilaterally, no accessory muscle use  Heart: No heaves or thrills. No peripheral varicosities  Constitutional: See vitals  Abdomen: Soft, non-distended, no rebound or rigidity, incision clean, dry,  intact   Uterus: Firm below umbilicus with non-tender fundus   Neurologic: CN II-XII grossly intact, no lateralizing defects, no gross movement abnormalities  Extremities: no edema, no calf tenderness    Assessment and Plan:     (spontaneous vaginal delivery)  PPD#2 s/p   Routine post partum care  Perineal care  Encourage ambulation  Lactation support  Anticipate discharge home today    Encounter for sterilization  POD #1 s/p mini laparotomy with bilateral salpingectomy  Routine post operative care      Mellissa Torrez DO

## 2021-01-23 NOTE — DISCHARGE INSTRUCTIONS
After a Vaginal Birth    After having a baby, your body may be very tired. It can take time to recover from a vaginal delivery. You may stay in the hospital or birth center from 1 to 4 days. In some cases, you may be able to go home the same day.  Right after the delivery  Your temperature and blood pressure will be taken until they are stable. A nurse or other healthcare provider will observe you as you rest. You may have afterbirth pains. These are cramps caused by the uterus shrinking. Sanitary pads are used to soak up the discharge of the uterine lining. To make sure that you aren t bleeding too much, the pad will be checked. And the firmness of your uterus will be checked. To do this, a nurse will gently push down on your stomach. If you had anesthesia, you ll be watched closely until you can feel and move your toes. If you have perineal pain (pain between the vagina and anus), an ice pack can help.   care  While still in the hospital or birth center, you ll learn how to hold and feed your baby. You will also be given instructions on how to care for your baby. This includes bathing and feeding.   Preparing to go home  You may be anxious to go home as soon as possible. Before you and your baby go home, a healthcare provider will check to be sure you are healthy enough to take care of your baby and yourself. You re ready to go home when:    You can walk to the bathroom and use the bathroom without help.    You can eat solid food and swallow pills (if needed).    You have no sign of infection or other health problems, including fever.     You have adequate pain control.    Your bleeding isn't excessive.    You are able to care for your  and are emotionally stable.  Before leaving the hospital or birth center, you ll be given written instructions for home self-care after vaginal delivery. Be sure to follow these instructions carefully. If you have questions or concerns, talk about them now.  If you  have stitches  You may have received stitches in the skin near your vagina. The stitches might have closed an episiotomy (an incision that enlarges the opening of the vagina). Or you may have needed stitches to repair torn skin. Either way, your stitches should dissolve within weeks. Until then, you can help reduce discomfort, aid healing, and reduce your risk of infection by keeping the stitches clean. These tips can help:    Gently wipe from front to back after you urinate or have a bowel movement.    After wiping, spray warm water on the area. Or you can have a sitz bath. This means sitting in a tub with a few inches of water in it. Then pat the area dry or use a hairdryer on a cool setting.    Do not use soap or any solution except water on the area.    You can take a shower unless told not to.    Change sanitary pads at least every 2 to 4 hours.    Place cold or heat packs on the area as directed by your healthcare providers or nurses. Keep a thin towel between the pack and your skin.    Sit on firm seats so the stitches pull less.   follow-up  Schedule a  follow-up exam with your healthcare provider for about 6 weeks after delivery. During this exam, your uterus and vaginal area will be checked. Contact your healthcare provider if you think you or your baby are having any problems.  When to call your healthcare provider  Call your healthcare provider right away if you have:    A fever of 100.4 F (38.0 C) or higher    Bleeding that needs a new sanitary pad after an hour, or large blood clots    Pain in your vagina that gets worse and isn't relieved with medicine    Swelling, discharge, or increased pain from vaginal tear or episiotomy    Burning, pain, red streaks, or lumpy areas in your breasts that may be accompanied by flu-like symptoms    Cracks, blisters, or blood on your nipples    Burning or pain when you urinate    Nausea or vomiting    Dizziness or fainting    Feelings of extreme  sadness or anxiety, or a feeling that you don t want to be with your baby    Belly pain that isn t relieved with medicine    Vaginal discharge that has a bad odor    No bowel movement for 5 days    Painful urination, or inability to control urination    Redness, warmth, or pain in the lower leg    Chest pain  [employer] last reviewed this educational content on 12/1/2017 2000-2020 [employer]. [address] [place] [address]. All rights reserved. This information is not intended as a substitute for professional medical care. Always follow your healthcare professional's instructions.        Postpartum Vaginal Delivery Instructions    Activity       Ask family and friends for help when you need it.    Do not place anything in your vagina for 6 weeks.    You are not restricted on other activities, but take it easy for a few weeks to allow your body to recover from delivery.  You are able to do any activities you feel up to that point.    No driving until you have stopped taking your pain medications (usually two weeks after delivery).     Call your health care provider if you have any of these symptoms:       Increased pain, swelling, redness, or fluid around your stiches from an episiotomy or perineal tear.    A fever above 100.4 F (38 C) with or without chills when placing a thermometer under your tongue.    You soak a sanitary pad with blood within 1 hour, or you see blood clots larger than a golf ball.    Bleeding that lasts more than 6 weeks.    Vaginal discharge that smells bad.    Severe pain, cramping or tenderness in your lower belly area.    A need to urinate more frequently (use the toilet more often), more urgently (use the toilet very quickly), or it burns when you urinate.    Nausea and vomiting.    Redness, swelling or pain around a vein in your leg.    Problems breastfeeding or a red or painful area on your breast.    Chest pain and cough or are gasping for air.    Problems  coping with sadness, anxiety, or depression.  If you have any concerns about hurting yourself or the baby, call your provider immediately.     You have questions or concerns after you return home.     Keep your hands clean:  Always wash your hands before touching your perineal area and stitches.  This helps reduce your risk of infection.  If your hands aren't dirty, you may use an alcohol hand-rub to clean your hands. Keep your nails clean and short.

## 2021-01-23 NOTE — DISCHARGE SUMMARY
[facility] Discharge Summary    Lanie Martin MRN# [id number]   Age: 30 year old YOB: 1990     Date of Admission:  2021  Date of Discharge::  2021  Admitting Physician:  Rand Calle MD  Discharge Physician:  Mellissa Torrez DO     Home clinic: [facility]          Admission Diagnoses:   Pregnant [Z34.90]  Prenatal care, subsequent pregnancy in third trimester [Z34.83]          Discharge Diagnosis:   Normal spontaneous vaginal delivery  S/p post partum tubal ligation          Procedures:   Procedure(s):        Post partum tubal ligation            Medications Prior to Admission:     Medications Prior to Admission   Medication Sig Dispense Refill Last Dose     Omega-3 Fatty Acids (FISH OIL PO)    2021 at Unknown time     Prenatal Vit-Fe Fumarate-FA (PRENATAL MULTIVITAMIN PLUS IRON) 27-0.8 MG TABS per tablet Take 1 tablet by mouth daily   2021 at Unknown time     Vitamin D, Cholecalciferol, 25 MCG (1000 UT) CAPS    2021 at Unknown time     escitalopram (LEXAPRO) 10 MG tablet Take 1.5 tablets (15 mg) by mouth daily (Patient not taking: Reported on 2021) 120 tablet 3      [DISCONTINUED] doxylamine (UNISOM SLEEPTABS) 25 MG TABS tablet Take 1 tablet (25 mg) by mouth At Bedtime (Patient not taking: Reported on 2021) 30 tablet 2      [DISCONTINUED] escitalopram (LEXAPRO) 20 MG tablet Take 1 tablet (20 mg) by mouth daily 90 tablet 1 2021 at Unknown time     [DISCONTINUED] ferrous gluconate (FERGON) 324 (38 Fe) MG tablet Take 324 mg by mouth daily (with breakfast)   2021 at Unknown time     [DISCONTINUED] ondansetron (ZOFRAN-ODT) 4 MG ODT tab Take 1 tablet (4 mg) by mouth every 8 hours as needed for nausea (Patient not taking: Reported on 2020) 15 tablet 0      [DISCONTINUED] pyridOXINE (VITAMIN B6) 25 MG tablet Take 1 tablet (25 mg) by mouth 3 times daily (Patient not taking: Reported on 2020) 90 tablet 1               Discharge Medications:     Current Discharge Medication List      START taking these medications    Details   ibuprofen (ADVIL/MOTRIN) 600 MG tablet Take 1 tablet (600 mg) by mouth every 6 hours as needed for moderate pain  Qty: 30 tablet, Refills: 1    Associated Diagnoses:  (spontaneous vaginal delivery)      oxyCODONE (ROXICODONE) 5 MG tablet Take 1 tablet (5 mg) by mouth every 6 hours as needed for severe pain  Qty: 8 tablet, Refills: 0    Associated Diagnoses:  (spontaneous vaginal delivery)      senna-docusate (SENOKOT-S/PERICOLACE) 8.6-50 MG tablet Take 1 tablet by mouth 2 times daily as needed for constipation  Qty: 15 tablet, Refills: 0    Associated Diagnoses:  (spontaneous vaginal delivery)         CONTINUE these medications which have NOT CHANGED    Details   Omega-3 Fatty Acids (FISH OIL PO)       Prenatal Vit-Fe Fumarate-FA (PRENATAL MULTIVITAMIN PLUS IRON) 27-0.8 MG TABS per tablet Take 1 tablet by mouth daily      Vitamin D, Cholecalciferol, 25 MCG (1000 UT) CAPS       escitalopram (LEXAPRO) 10 MG tablet Take 1.5 tablets (15 mg) by mouth daily  Qty: 120 tablet, Refills: 3    Associated Diagnoses: Other depression         STOP taking these medications       doxylamine (UNISOM SLEEPTABS) 25 MG TABS tablet Comments:   Reason for Stopping:         ferrous gluconate (FERGON) 324 (38 Fe) MG tablet Comments:   Reason for Stopping:         ondansetron (ZOFRAN-ODT) 4 MG ODT tab Comments:   Reason for Stopping:         pyridOXINE (VITAMIN B6) 25 MG tablet Comments:   Reason for Stopping:                     Consultations:   No consultations were requested during this admission          Brief History of Labor:   Ms Martin is a 29 yo  who presented in spontaneous labor at 38w6d. Pregnancy complicated by anx/dep. Epidural was placed for pain management and she progressed to complete dilation. SROM of thin meconium-fluid. She pushed to a slow crown and delivered a vigorous male infant vertex, [place]  "via . APGARs 8 and 9. Weight pending due to skin-to-skin contact. P-NP team present, but spontaneous cry was noted at the perineum. Placenta delivered via gentle cord traction and was noted to be intact with a 3V cord. QBL 50cc. Bilateral periurethral lacerations were repaired with 4-0 vicryl. Fundus firm with fundal massage and IV pitocin.   Mom and baby \"Ace\" were stable for transport.            Hospital Course:   The patient's hospital course was unremarkable. She underwent a post partum tubal ligation on PPD#1 via mini laparotomy with bilateral salpingectomy. On discharge, her pain was well controlled. Vaginal bleeding is similar to peak menstrual flow.  Voiding without difficulty.  Ambulating well and tolerating a normal diet.  No fever.  Breastfeeding well.  Infant is stable.  No bowel movement yet.  She was discharged on post-partum day #2.    Post-partum hemoglobin:   Hemoglobin   Date Value Ref Range Status   2021 12.3 11.7 - 15.7 g/dL Final             Discharge Instructions and Follow-Up:   Discharge diet: Regular   Discharge activity: No heavy lifting for 2 week(s)  No driving or operating machinery while on narcotic analgesics  Pelvic rest: abstain from intercourse and do not use tampons for 6 week(s)   Discharge follow-up: Follow up with OBGYN in 6 weeks   Wound care: Drink plenty of fluids  Ice to area for comfort           Discharge Disposition:   Discharged to home      Attestation:  I have reviewed today's vital signs, notes, medications, labs and imaging.    Mellissa Torrez DO     "

## 2021-01-23 NOTE — PLAN OF CARE
Data: Vital signs within normal limits. Postpartum checks within normal limits - see flow record. Patient  Is tolerating po intake. Patient is able to empty bladder independently. . Patient ambulating independently..   No apparent signs of infection. Incision healing well. Patient Is performing self cares and Is able to care for infant. Positive attachment behaviors are observed with infant. Support persons are present.  Action:  Pain plan was discussed. Patient will request pain med when she is ready for it. Patient was medicated during the shift for pain. See MAR.Patient education done about postpartum cares. See flow record.  Response:   Patient reassessed within 1 hour after each medication for pain. Patient stated that pain had improved. Patient stated that she was comfortable. .   Plan: Anticipate discharge on 1/23/21.

## 2021-01-26 LAB — COPATH REPORT: NORMAL

## 2021-02-18 ENCOUNTER — MEDICAL CORRESPONDENCE (OUTPATIENT)
Dept: HEALTH INFORMATION MANAGEMENT | Facility: CLINIC | Age: 31
End: 2021-02-18

## 2021-03-05 ENCOUNTER — PRENATAL OFFICE VISIT (OUTPATIENT)
Dept: OBGYN | Facility: CLINIC | Age: 31
End: 2021-03-05
Payer: COMMERCIAL

## 2021-03-05 VITALS
BODY MASS INDEX: 25.62 KG/M2 | HEIGHT: 62 IN | TEMPERATURE: 96.4 F | RESPIRATION RATE: 16 BRPM | WEIGHT: 139.2 LBS | SYSTOLIC BLOOD PRESSURE: 102 MMHG | HEART RATE: 58 BPM | DIASTOLIC BLOOD PRESSURE: 67 MMHG

## 2021-03-05 DIAGNOSIS — Z12.4 PAP SMEAR FOR CERVICAL CANCER SCREENING: ICD-10-CM

## 2021-03-05 PROBLEM — Z30.2 ENCOUNTER FOR STERILIZATION: Status: RESOLVED | Noted: 2020-07-22 | Resolved: 2021-03-05

## 2021-03-05 PROBLEM — Z34.80 PRENATAL CARE, SUBSEQUENT PREGNANCY: Status: RESOLVED | Noted: 2018-06-26 | Resolved: 2021-03-05

## 2021-03-05 PROCEDURE — 99207 PR POST PARTUM EXAM: CPT | Performed by: OBSTETRICS & GYNECOLOGY

## 2021-03-05 PROCEDURE — 87624 HPV HI-RISK TYP POOLED RSLT: CPT | Performed by: OBSTETRICS & GYNECOLOGY

## 2021-03-05 PROCEDURE — G0145 SCR C/V CYTO,THINLAYER,RESCR: HCPCS | Performed by: OBSTETRICS & GYNECOLOGY

## 2021-03-05 RX ORDER — ESCITALOPRAM OXALATE 20 MG/1
20 TABLET ORAL DAILY
COMMUNITY
End: 2022-09-02

## 2021-03-05 ASSESSMENT — MIFFLIN-ST. JEOR: SCORE: 1294.66

## 2021-03-05 ASSESSMENT — ANXIETY QUESTIONNAIRES
IF YOU CHECKED OFF ANY PROBLEMS ON THIS QUESTIONNAIRE, HOW DIFFICULT HAVE THESE PROBLEMS MADE IT FOR YOU TO DO YOUR WORK, TAKE CARE OF THINGS AT HOME, OR GET ALONG WITH OTHER PEOPLE: NOT DIFFICULT AT ALL
1. FEELING NERVOUS, ANXIOUS, OR ON EDGE: NOT AT ALL
3. WORRYING TOO MUCH ABOUT DIFFERENT THINGS: NOT AT ALL
GAD7 TOTAL SCORE: 2
2. NOT BEING ABLE TO STOP OR CONTROL WORRYING: NOT AT ALL
6. BECOMING EASILY ANNOYED OR IRRITABLE: SEVERAL DAYS
7. FEELING AFRAID AS IF SOMETHING AWFUL MIGHT HAPPEN: NOT AT ALL
5. BEING SO RESTLESS THAT IT IS HARD TO SIT STILL: NOT AT ALL

## 2021-03-05 ASSESSMENT — PATIENT HEALTH QUESTIONNAIRE - PHQ9
SUM OF ALL RESPONSES TO PHQ QUESTIONS 1-9: 1
5. POOR APPETITE OR OVEREATING: SEVERAL DAYS
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
## 2021-03-06 ASSESSMENT — ANXIETY QUESTIONNAIRES: GAD7 TOTAL SCORE: 2

## 2021-03-09 LAB
COPATH REPORT: NORMAL
PAP: NORMAL

## 2021-05-30 ENCOUNTER — RECORDS - HEALTHEAST (OUTPATIENT)
Dept: ADMINISTRATIVE | Facility: CLINIC | Age: 31
End: 2021-05-30

## 2021-07-20 ENCOUNTER — DOCUMENTATION ONLY (OUTPATIENT)
Dept: PEDIATRICS | Facility: CLINIC | Age: 31
End: 2021-07-20

## 2021-07-20 NOTE — PROGRESS NOTES
EPDS was done in clinic today during child's 6 month visit and score was 12 with negative psychosis and negative screen for suicidal ideation.   Lanie has history of depression treated with escitalopram.       Plan:   Discussed mood with Lanie - she reports that she has tried counseling in the past but found it cost-prohibitive and not covered by insurance.  She states she feels safe and is seeing her PCP.  Encouraged her to notify PCP or this clinic if feeling worse.  Behavioral health referral was not placed.

## 2021-10-02 ENCOUNTER — HEALTH MAINTENANCE LETTER (OUTPATIENT)
Age: 31
End: 2021-10-02

## 2022-05-10 ENCOUNTER — OFFICE VISIT (OUTPATIENT)
Dept: FAMILY MEDICINE | Facility: CLINIC | Age: 32
End: 2022-05-10
Payer: COMMERCIAL

## 2022-05-10 VITALS
TEMPERATURE: 96.9 F | BODY MASS INDEX: 23.39 KG/M2 | HEART RATE: 67 BPM | RESPIRATION RATE: 16 BRPM | HEIGHT: 63 IN | WEIGHT: 132 LBS | SYSTOLIC BLOOD PRESSURE: 110 MMHG | DIASTOLIC BLOOD PRESSURE: 70 MMHG | OXYGEN SATURATION: 98 %

## 2022-05-10 DIAGNOSIS — R00.2 PALPITATIONS: Primary | ICD-10-CM

## 2022-05-10 DIAGNOSIS — Z82.49 FAMILY HISTORY OF HYPERTROPHIC CARDIOMYOPATHY: ICD-10-CM

## 2022-05-10 LAB
ANION GAP SERPL CALCULATED.3IONS-SCNC: 4 MMOL/L (ref 3–14)
BUN SERPL-MCNC: 7 MG/DL (ref 7–30)
CALCIUM SERPL-MCNC: 8.5 MG/DL (ref 8.5–10.1)
CHLORIDE BLD-SCNC: 110 MMOL/L (ref 94–109)
CO2 SERPL-SCNC: 27 MMOL/L (ref 20–32)
CREAT SERPL-MCNC: 0.62 MG/DL (ref 0.52–1.04)
ERYTHROCYTE [DISTWIDTH] IN BLOOD BY AUTOMATED COUNT: 12.2 % (ref 10–15)
GFR SERPL CREATININE-BSD FRML MDRD: >90 ML/MIN/1.73M2
GLUCOSE BLD-MCNC: 83 MG/DL (ref 70–99)
HCT VFR BLD AUTO: 39.8 % (ref 35–47)
HGB BLD-MCNC: 13 G/DL (ref 11.7–15.7)
MCH RBC QN AUTO: 30.8 PG (ref 26.5–33)
MCHC RBC AUTO-ENTMCNC: 32.7 G/DL (ref 31.5–36.5)
MCV RBC AUTO: 94 FL (ref 78–100)
PLATELET # BLD AUTO: 259 10E3/UL (ref 150–450)
POTASSIUM BLD-SCNC: 3.8 MMOL/L (ref 3.4–5.3)
RBC # BLD AUTO: 4.22 10E6/UL (ref 3.8–5.2)
SODIUM SERPL-SCNC: 141 MMOL/L (ref 133–144)
TSH SERPL DL<=0.005 MIU/L-ACNC: 0.53 MU/L (ref 0.4–4)
WBC # BLD AUTO: 3.5 10E3/UL (ref 4–11)

## 2022-05-10 PROCEDURE — 85027 COMPLETE CBC AUTOMATED: CPT | Performed by: FAMILY MEDICINE

## 2022-05-10 PROCEDURE — 84443 ASSAY THYROID STIM HORMONE: CPT | Performed by: FAMILY MEDICINE

## 2022-05-10 PROCEDURE — 80048 BASIC METABOLIC PNL TOTAL CA: CPT | Performed by: FAMILY MEDICINE

## 2022-05-10 PROCEDURE — 93000 ELECTROCARDIOGRAM COMPLETE: CPT | Performed by: FAMILY MEDICINE

## 2022-05-10 PROCEDURE — 99204 OFFICE O/P NEW MOD 45 MIN: CPT | Performed by: FAMILY MEDICINE

## 2022-05-10 PROCEDURE — 36415 COLL VENOUS BLD VENIPUNCTURE: CPT | Performed by: FAMILY MEDICINE

## 2022-05-10 RX ORDER — BUPROPION HYDROCHLORIDE 100 MG/1
150 TABLET ORAL DAILY
COMMUNITY
Start: 2022-03-01 | End: 2022-09-02

## 2022-05-10 ASSESSMENT — ANXIETY QUESTIONNAIRES
6. BECOMING EASILY ANNOYED OR IRRITABLE: NOT AT ALL
2. NOT BEING ABLE TO STOP OR CONTROL WORRYING: NOT AT ALL
7. FEELING AFRAID AS IF SOMETHING AWFUL MIGHT HAPPEN: NOT AT ALL
5. BEING SO RESTLESS THAT IT IS HARD TO SIT STILL: NOT AT ALL
GAD7 TOTAL SCORE: 3
1. FEELING NERVOUS, ANXIOUS, OR ON EDGE: SEVERAL DAYS
IF YOU CHECKED OFF ANY PROBLEMS ON THIS QUESTIONNAIRE, HOW DIFFICULT HAVE THESE PROBLEMS MADE IT FOR YOU TO DO YOUR WORK, TAKE CARE OF THINGS AT HOME, OR GET ALONG WITH OTHER PEOPLE: NOT DIFFICULT AT ALL
3. WORRYING TOO MUCH ABOUT DIFFERENT THINGS: SEVERAL DAYS

## 2022-05-10 ASSESSMENT — PAIN SCALES - GENERAL: PAINLEVEL: NO PAIN (0)

## 2022-05-10 ASSESSMENT — PATIENT HEALTH QUESTIONNAIRE - PHQ9
5. POOR APPETITE OR OVEREATING: SEVERAL DAYS
SUM OF ALL RESPONSES TO PHQ QUESTIONS 1-9: 3

## 2022-05-10 NOTE — PATIENT INSTRUCTIONS
EKG today in clinic     Lab work today    Schedule ECHO heart test    Schedule Holter monitor appointment to set up.     Follow up with Cardiology.

## 2022-05-10 NOTE — PROGRESS NOTES
Assessment & Plan     Family history of hypertrophic cardiomyopathy  Palpitations  -- father recently diagnosed with hypertrophic cardiomyopathy.  She is asymptomatic with no chest pain, shortness of breath or any exertional symptoms.  She does report having palpitations 20+ times a day 3-4 beats without any other symptoms.  Will check basic lab work as below, EKG, echocardiogram, Holter monitor and refer to Cardiology.  All questions answered.  Patient agrees with the plan.  - CBC with platelets  - Basic metabolic panel  (Ca, Cl, CO2, Creat, Gluc, K, Na, BUN)  - TSH with free T4 reflex  - Echocardiogram Complete  - Adult Leadless EKG Monitor 3 to 7 Days  - Adult Cardiology Eval  Referral  - EKG 12-lead complete w/read - Clinics    EKG: NSR    Follow up with Cardiology after completion of tests.     The risks, benefits and treatment options of prescribed medications or other treatments have been discussed with the patient. The patient verbalized their understanding and should call or follow up if no improvement or if they develop further problems.      Brad Lopez [facility]    Valerio Garza is a 31 year old who presents for the following health issues     History of Present Illness       Vascular Disease:  She presents for follow up of vascular disease.  She never takes nitroglycerin. She is not taking daily aspirin.    Reason for visit:  Father has hypertrophic cardiomyopthy and son has SVT  Symptom onset:  More than a month  Symptoms include:  Frequent Heart palputations  Symptom intensity:  Mild  Symptom progression:  Staying the same  Had these symptoms before:  Yes  Has tried/received treatment for these symptoms:  No  What makes it worse:  Anxiety  What makes it better:  No    She eats 2-3 servings of fruits and vegetables daily.She consumes 1 sweetened beverage(s) daily.She exercises with enough effort to increase her heart rate 9 or less minutes per day.  She  "exercises with enough effort to increase her heart rate 3 or less days per week.   She is taking medications regularly.     Father with hypertrophic cardiomyopathy.     Reports having heart palpitations daily, will have 3-4 irregular beats at at time. will get 20+ episodes daily  No chest pain. Will feel like muscles in chest are tight at times.   No shortness of breath.   No dyspnea or chest discomfort with exertion.   Caffeine: 2-4 cups of coffee per day.   Tobacco: No use.   Alcohol: will drink 2-3 alcoholic beverages on the weekends.   meds: Wellbutrin 150 mg daily, Lexapro 20 mg daily.   Mood has been good.     Review of Systems   Constitutional, HEENT, cardiovascular, pulmonary, gi and gu systems are negative, except as otherwise noted.      Objective    /70 (BP Location: Right arm, Patient Position: Chair, Cuff Size: Adult Regular)   Pulse 67   Temp 96.9  F (36.1  C) (Tympanic)   Resp 16   Ht 1.605 m (5' 3.19\")   Wt 59.9 kg (132 lb)   LMP 05/07/2022 (Exact Date)   SpO2 98%   BMI 23.24 kg/m    Body mass index is 23.24 kg/m .  Physical Exam   General: alert, cooperative, no acute distress   CV: RRR, no murmur  Resp: non-labored breathing, clear to auscultation, no wheezing or rales   Abdomen: Soft, non-tender, no guarding.   Extremities: No peripheral edema, calves non-tender.       "

## 2022-05-11 ASSESSMENT — ANXIETY QUESTIONNAIRES: GAD7 TOTAL SCORE: 3

## 2022-05-14 ENCOUNTER — HEALTH MAINTENANCE LETTER (OUTPATIENT)
Age: 32
End: 2022-05-14

## 2022-06-27 ENCOUNTER — HOSPITAL ENCOUNTER (OUTPATIENT)
Dept: CARDIOLOGY | Facility: CLINIC | Age: 32
Discharge: HOME | End: 2022-06-27
Attending: FAMILY MEDICINE | Admitting: FAMILY MEDICINE
Payer: COMMERCIAL

## 2022-06-27 DIAGNOSIS — Z82.49 FAMILY HISTORY OF HYPERTROPHIC CARDIOMYOPATHY: ICD-10-CM

## 2022-06-27 PROCEDURE — 93242 EXT ECG>48HR<7D RECORDING: CPT

## 2022-06-27 PROCEDURE — 93244 EXT ECG>48HR<7D REV&INTERPJ: CPT | Performed by: INTERNAL MEDICINE

## 2022-07-01 ENCOUNTER — HOSPITAL ENCOUNTER (OUTPATIENT)
Dept: CARDIOLOGY | Facility: CLINIC | Age: 32
Discharge: HOME | End: 2022-07-01
Attending: FAMILY MEDICINE | Admitting: FAMILY MEDICINE
Payer: COMMERCIAL

## 2022-07-01 DIAGNOSIS — Z82.49 FAMILY HISTORY OF HYPERTROPHIC CARDIOMYOPATHY: ICD-10-CM

## 2022-07-01 LAB — LVEF ECHO: NORMAL

## 2022-07-01 PROCEDURE — 999N000208 ECHOCARDIOGRAM COMPLETE

## 2022-07-01 PROCEDURE — 255N000002 HC RX 255 OP 636: Performed by: FAMILY MEDICINE

## 2022-07-01 PROCEDURE — 93306 TTE W/DOPPLER COMPLETE: CPT | Mod: 26 | Performed by: INTERNAL MEDICINE

## 2022-07-01 RX ADMIN — HUMAN ALBUMIN MICROSPHERES AND PERFLUTREN 2 ML: 10; .22 INJECTION, SOLUTION INTRAVENOUS at 08:33

## 2022-07-06 ENCOUNTER — OFFICE VISIT (OUTPATIENT)
Dept: CARDIOLOGY | Facility: CLINIC | Age: 32
End: 2022-07-06
Attending: FAMILY MEDICINE
Payer: COMMERCIAL

## 2022-07-06 VITALS
DIASTOLIC BLOOD PRESSURE: 60 MMHG | HEART RATE: 79 BPM | RESPIRATION RATE: 14 BRPM | SYSTOLIC BLOOD PRESSURE: 92 MMHG | WEIGHT: 133 LBS | BODY MASS INDEX: 23.42 KG/M2

## 2022-07-06 DIAGNOSIS — Z82.49 FAMILY HISTORY OF HYPERTROPHIC CARDIOMYOPATHY: ICD-10-CM

## 2022-07-06 PROCEDURE — 99203 OFFICE O/P NEW LOW 30 MIN: CPT | Performed by: INTERNAL MEDICINE

## 2022-07-06 NOTE — LETTER
7/6/2022    [facility]  No address on file    RE: Lanie Martin       Dear Colleague,     I had the pleasure of seeing Lanie Martin in the [facility].      Click to link to [facility] HEART [facility] NOTE    Thank you, Dr. Lopez, for asking me to see Lanie Martin in consultation at [facility] to rule out HCM.      Assessment/Plan:   1.  Family history of hypertrophic cardiomyopathy: The patient's father was diagnosed with hypertrophic cardiomyopathy.  Clinically the patient has no symptoms.  Her palpitations are caused by occasional PVCs or PACs.  Her ECG showed normal sinus rhythm, no LVH.  Her echocardiogram demonstrated normal left ventricular size, wall thickness, systolic function, no obvious valvular disease.  Based on her current evaluation, there is no evidence of hypertrophic cardiomyopathy.  Her father did not have a genetically tested.  It is not necessary for her to have gene test.  Based on current evaluation, she has no evidence of a hypertrophic cardiomyopathy at this time.  Recommended EKG annually, echocardiogram every 5 years.  The patient agreed with the plan.    Thank you for the opportunity to be involved in the care of Lanie Martin. If you have any questions, please feel free to contact me.  I will see the patient again as needed    Much or all of the text in this note was generated through the use of Dragon Dictate voice-to-text software. Errors in spelling or words which seem out of context are unintentional.   Sound alike errors, in particular, may have escaped editing.       History of Present Illness:   It is my pleasure to see Lanie Martin at the [facility] for evaluation of palpitations. Lanie Martin is a 32 year old female with a medical history of anxiety, depression, family history of hypertrophic cardiomyopathy.    The patient's father was diagnosed  with hypertrophic cardiomyopathy.  She was referred to cardiology for ruling out hypertrophic cardiomyopathy.  She denies chest pain, shortness of breath.  She has occasional fluttering heartbeat.  She had no dizziness, lightheadedness or syncope.  She has no orthopnea, PND or leg edema.  Her blood pressure and heart rate are in normal range.    Her ECG showed normal sinus rhythm, no evidence of LVH.  Echocardiogram was reported normal left ventricular size, wall thickness and systolic function, no obvious valvular disease.    Past Medical History:     Patient Active Problem List   Diagnosis     Other depression     Family history of hypertrophic cardiomyopathy       Past Surgical History:     Past Surgical History:   Procedure Laterality Date     AS CREATE EARDRUM OPENING,GEN ANESTH       LAPAROTOMY MINI, TUBAL LIGATION (POST PARTUM), COMBINED Bilateral 1/22/2021    Procedure: LIGATION, FALLOPIAN TUBE, POSTPARTUM;  Surgeon: Mellissa Torrez DO;  Location: [facility] OR     MOUTH SURGERY      wisdom teeth       Family History:     Family History   Problem Relation Age of Onset     Mental Illness Mother      Depression Mother      Hypertension Father      Hypertrophic cardiomyopathy Father      Coronary Artery Disease Maternal Grandmother         MI x3     Diabetes Maternal Grandfather      Cancer Paternal Grandmother      Cancer Paternal Grandfather      Supraventricular tachycardia Son        Social History:    reports that she has quit smoking. She quit after 7.00 years of use. She has never used smokeless tobacco. She reports current alcohol use. She reports current drug use. Drug: Marijuana.    Review of Systems:   12 systems are reviewed negative except for in HPI.    Meds:     Current Outpatient Medications:      buPROPion (WELLBUTRIN) 100 MG tablet, Take 150 mg by mouth daily, Disp: , Rfl:      escitalopram (LEXAPRO) 20 MG tablet, Take 20 mg by mouth daily, Disp: , Rfl:      Vitamin D, Cholecalciferol, 25 MCG (1000  UT) CAPS, Take 50,000 Int'l Units/L by mouth once a week, Disp: , Rfl:      Allergies:   Tylenol w/codeine [acetaminophen-codeine]    Objective:      Physical Exam  60.3 kg (133 lb)     Body mass index is 23.42 kg/m .  BP 92/60 (BP Location: Left arm, Patient Position: Sitting, Cuff Size: Adult Regular)   Pulse 79   Resp 14   Wt 60.3 kg (133 lb)   LMP 05/07/2022 (Exact Date)   BMI 23.42 kg/m      General Appearance:   Awake, Alert, No acute distress.   HEENT:  Pupil equal, reactive to light. No scleral icterus; the mucous membranes were moist. No oral ulcers or thrush.    Neck: No cervical bruits. No JVD. No thyromegaly. No lymph node enlargement or tenderness.   Chest: The spine was straight. The chest was symmetric.   Lungs:   Respirations unlabored. Lungs are clear to auscultation. No crackles. No wheezing.   Cardiovascular:   RRR, normal first and second heart sounds with no murmurs. No rubs or gallops.    Abdomen:  Soft. No tenderness. Non-distended. Bowels sounds are present   Extremities: Equal posterior tibial pulses. No leg edema.   Skin: No rashes or ulcers. Warm, Dry.   Musculoskeletal: No tenderness. No deformity.   Neurologic: Mood and affect are appropriate. No focal deficits.         EKG:  Personally reivewed  Normal sinus rhythm  Normal ECG     Cardiac Imaging Studies  ECHO on 7-1-2022:  The visual ejection fraction is 55-60%.  Left ventricular systolic function is normal.    Lab Review   Lab Results   Component Value Date     05/10/2022     05/28/2020    CO2 27 05/10/2022    CO2 28 05/28/2020    BUN 7 05/10/2022    BUN 11 05/28/2020     Lab Results   Component Value Date    WBC 3.5 05/10/2022    WBC 13.3 01/20/2021    HGB 13.0 05/10/2022    HGB 12.3 01/20/2021    HCT 39.8 05/10/2022    HCT 35.3 01/20/2021    MCV 94 05/10/2022    MCV 90 01/20/2021     05/10/2022     01/20/2021     Lab Results   Component Value Date    TSH 0.53 05/10/2022                   Thank you for  allowing me to participate in the care of your patient.      Sincerely,     Casey Saravia MD     [facility]  cc:   Brad Lopez, DO  [address] [place] [address]

## 2022-08-09 ENCOUNTER — OFFICE VISIT (OUTPATIENT)
Dept: OBGYN | Facility: CLINIC | Age: 32
End: 2022-08-09
Payer: COMMERCIAL

## 2022-08-09 VITALS
WEIGHT: 134 LBS | BODY MASS INDEX: 23.6 KG/M2 | SYSTOLIC BLOOD PRESSURE: 100 MMHG | HEART RATE: 92 BPM | TEMPERATURE: 97.8 F | DIASTOLIC BLOOD PRESSURE: 69 MMHG

## 2022-08-09 DIAGNOSIS — N92.6 IRREGULAR MENSES: Primary | ICD-10-CM

## 2022-08-09 PROCEDURE — 99214 OFFICE O/P EST MOD 30 MIN: CPT | Performed by: OBSTETRICS & GYNECOLOGY

## 2022-08-09 RX ORDER — DROSPIRENONE AND ETHINYL ESTRADIOL 0.03MG-3MG
1 KIT ORAL DAILY
Qty: 28 TABLET | Refills: 3 | Status: SHIPPED | OUTPATIENT
Start: 2022-08-09 | End: 2023-12-21

## 2022-08-09 NOTE — PROGRESS NOTES
Lanie is a 32 year old   is here today complaining of irregul;ar cycles.  This has been a problem for the last couple months.  She was on ORAL CONTRACEPTIVE PILLS prior to her pregnancies and cycled normally on them.  Her last two cycles were drawn out and heavy..       ROS: Pertinent ROS as above.    Gyn Hx:      Past Medical History:   Diagnosis Date     Carpal tunnel syndrome     during pregnancy     Chickenpox      Depression      Past Surgical History:   Procedure Laterality Date     AS CREATE EARDRUM OPENING,GEN ANESTH       LAPAROTOMY MINI, TUBAL LIGATION (POST PARTUM), COMBINED Bilateral 2021    Procedure: LIGATION, FALLOPIAN TUBE, POSTPARTUM;  Surgeon: Mellissa Torrez DO;  Location: [facility] OR     MOUTH SURGERY      wisdom teeth     Patient Active Problem List   Diagnosis     Other depression     Family history of hypertrophic cardiomyopathy       ALL/Meds: Her medication and allergy histories were reviewed and are documented in their appropriate chart areas.    SH: Reviewed and documented in the appropriate area of the chart.  FH:  Her family history is reviewed and updated in the chart, today.  PMH: Her past medical, surgical, and obstetric histories were reviewed and updated today in the appropriate chart areas.    PE: /69 (BP Location: Left arm, Patient Position: Chair, Cuff Size: Adult Large)   Pulse 92   Temp 97.8  F (36.6  C) (Tympanic)   Wt 60.8 kg (134 lb)   LMP 2022 (Approximate)   Breastfeeding No   BMI 23.60 kg/m    Body mass index is 23.6 kg/m .    Pertinent Physical exam findings:    General Appearance:  healthy, alert, active, no distress    Discussed with Lanie, the options for treatment.  We discussed medical management including both oral and non-oral options.  We discussed the risks and benefits of cyclic and continuous estrogen/progestin combinations including thrombotic events.  We discussed cyclic and and continuous progestins including breakthough  bleeding.    We discussed surgical options including, but not limited to endometrial ablation and hysterectomy.        A/P:(N92.6) Irregular menses  (primary encounter diagnosis)  Comment: 30 minutes spent on the date of the encounter doing chart review, patient visit and documentation   Plan: drospirenone-ethinyl estradiol (SASKIA) 3-0.03         MG tablet        She will wait a month or two to see if her cycles improve, otherwise she will use the Saskia for 2-3 cycles and then stop it to see if her cycles right themselves.         -     - No orders of the defined types were placed in this encounter.

## 2022-08-09 NOTE — NURSING NOTE
"Initial /69 (BP Location: Left arm, Patient Position: Chair, Cuff Size: Adult Large)   Pulse 92   Temp 97.8  F (36.6  C) (Tympanic)   Wt 60.8 kg (134 lb)   LMP 07/12/2022 (Approximate)   Breastfeeding No   BMI 23.60 kg/m   Estimated body mass index is 23.6 kg/m  as calculated from the following:    Height as of 5/10/22: 1.605 m (5' 3.19\").    Weight as of this encounter: 60.8 kg (134 lb). .    Domenica Renee MA    "

## 2022-08-28 ASSESSMENT — ANXIETY QUESTIONNAIRES
8. IF YOU CHECKED OFF ANY PROBLEMS, HOW DIFFICULT HAVE THESE MADE IT FOR YOU TO DO YOUR WORK, TAKE CARE OF THINGS AT HOME, OR GET ALONG WITH OTHER PEOPLE?: SOMEWHAT DIFFICULT
4. TROUBLE RELAXING: SEVERAL DAYS
3. WORRYING TOO MUCH ABOUT DIFFERENT THINGS: MORE THAN HALF THE DAYS
7. FEELING AFRAID AS IF SOMETHING AWFUL MIGHT HAPPEN: NOT AT ALL
IF YOU CHECKED OFF ANY PROBLEMS ON THIS QUESTIONNAIRE, HOW DIFFICULT HAVE THESE PROBLEMS MADE IT FOR YOU TO DO YOUR WORK, TAKE CARE OF THINGS AT HOME, OR GET ALONG WITH OTHER PEOPLE: SOMEWHAT DIFFICULT
2. NOT BEING ABLE TO STOP OR CONTROL WORRYING: NOT AT ALL
1. FEELING NERVOUS, ANXIOUS, OR ON EDGE: NOT AT ALL
GAD7 TOTAL SCORE: 4
5. BEING SO RESTLESS THAT IT IS HARD TO SIT STILL: NOT AT ALL
7. FEELING AFRAID AS IF SOMETHING AWFUL MIGHT HAPPEN: NOT AT ALL
6. BECOMING EASILY ANNOYED OR IRRITABLE: SEVERAL DAYS

## 2022-08-28 ASSESSMENT — PATIENT HEALTH QUESTIONNAIRE - PHQ9
SUM OF ALL RESPONSES TO PHQ QUESTIONS 1-9: 3
SUM OF ALL RESPONSES TO PHQ QUESTIONS 1-9: 3
10. IF YOU CHECKED OFF ANY PROBLEMS, HOW DIFFICULT HAVE THESE PROBLEMS MADE IT FOR YOU TO DO YOUR WORK, TAKE CARE OF THINGS AT HOME, OR GET ALONG WITH OTHER PEOPLE: NOT DIFFICULT AT ALL

## 2022-09-02 ENCOUNTER — VIRTUAL VISIT (OUTPATIENT)
Dept: FAMILY MEDICINE | Facility: CLINIC | Age: 32
End: 2022-09-02
Payer: COMMERCIAL

## 2022-09-02 DIAGNOSIS — F33.41 RECURRENT MAJOR DEPRESSIVE DISORDER, IN PARTIAL REMISSION (H): Primary | ICD-10-CM

## 2022-09-02 PROCEDURE — 99213 OFFICE O/P EST LOW 20 MIN: CPT | Mod: 95 | Performed by: FAMILY MEDICINE

## 2022-09-02 RX ORDER — ESCITALOPRAM OXALATE 20 MG/1
20 TABLET ORAL DAILY
Qty: 90 TABLET | Refills: 3 | Status: SHIPPED | OUTPATIENT
Start: 2022-09-02 | End: 2023-12-21

## 2022-09-02 RX ORDER — BUPROPION HYDROCHLORIDE 100 MG/1
150 TABLET ORAL DAILY
Qty: 135 TABLET | Refills: 3 | Status: SHIPPED | OUTPATIENT
Start: 2022-09-02 | End: 2023-12-21

## 2022-09-02 ASSESSMENT — PATIENT HEALTH QUESTIONNAIRE - PHQ9
SUM OF ALL RESPONSES TO PHQ QUESTIONS 1-9: 3
10. IF YOU CHECKED OFF ANY PROBLEMS, HOW DIFFICULT HAVE THESE PROBLEMS MADE IT FOR YOU TO DO YOUR WORK, TAKE CARE OF THINGS AT HOME, OR GET ALONG WITH OTHER PEOPLE: NOT DIFFICULT AT ALL

## 2022-09-02 ASSESSMENT — ANXIETY QUESTIONNAIRES: GAD7 TOTAL SCORE: 4

## 2022-09-02 NOTE — PROGRESS NOTES
Kayla is a 32 year old who is being evaluated via a billable video visit.      How would you like to obtain your AVS? MyChart  If the video visit is dropped, the invitation should be resent by: Text to cell phone: [phone]  Will anyone else be joining your video visit? No          Assessment & Plan     Recurrent major depressive disorder, in partial remission (H)  Doing well. No concerns. Needs refills. No SI or HI. Refills provided.   Current regimen Lexapro 20 mg daily, Wellbutrin 150 mg daily.   - buPROPion (WELLBUTRIN) 100 MG tablet  Dispense: 135 tablet; Refill: 3  - escitalopram (LEXAPRO) 20 MG tablet  Dispense: 90 tablet; Refill: 3    The risks, benefits and treatment options of prescribed medications or other treatments have been discussed with the patient. The patient verbalized their understanding and should call or follow up if no improvement or if they develop further problems.      Brad [facility]    Subjective   Kayla is a 32 year old, presenting for the following health issues:  No chief complaint on file.      History of Present Illness       Mental Health Follow-up:  Patient presents to follow-up on Depression & Anxiety.Patient's depression since last visit has been:  Good  The patient is not having other symptoms associated with depression.  Patient's anxiety since last visit has been:  Good  The patient is not having other symptoms associated with anxiety.  Any significant life events: No  Patient is not feeling anxious or having panic attacks.  Patient has no concerns about alcohol or drug use.    She eats 0-1 servings of fruits and vegetables daily.She consumes 1 sweetened beverage(s) daily.She exercises with enough effort to increase her heart rate 20 to 29 minutes per day.  She exercises with enough effort to increase her heart rate 3 or less days per week. She is missing 1 dose(s) of medications per week.  She is not taking prescribed medications  regularly due to remembering to take.    Today's PHQ-9         PHQ-9 Total Score: 3    PHQ-9 Q9 Thoughts of better off dead/self-harm past 2 weeks :   Not at all    How difficult have these problems made it for you to do your work, take care of things at home, or get along with other people: Not difficult at all  Today's TRINITY-7 Score: 4     Currently on lexapro 20 mg daily, Wellbutrin 150 mg daily.   Doing well. No issues.   Wishes to continue on current dose.   No SI or HI.    Social History     Tobacco Use     Smoking status: Former Smoker     Years: 7.00     Smokeless tobacco: Never Used     Tobacco comment: quit 5-6 year ago   Vaping Use     Vaping Use: Never used   Substance Use Topics     Alcohol use: Yes     Comment: once a week 2-3 drinks     Drug use: Yes     Types: Marijuana     Comment: stopped with pregnancy     PHQ 3/5/2021 5/10/2022 8/28/2022   PHQ-9 Total Score 1 3 3   Q9: Thoughts of better off dead/self-harm past 2 weeks Not at all Not at all Not at all     TRINITY-7 SCORE 3/5/2021 5/10/2022 8/28/2022   Total Score - - 4 (minimal anxiety)   Total Score 2 3 4       Suicide Assessment Five-step Evaluation and Treatment (SAFE-T)        Review of Systems   Constitutional, HEENT, cardiovascular, pulmonary, gi and gu systems are negative, except as otherwise noted.      Objective           Vitals:  No vitals were obtained today due to virtual visit.    Physical Exam   GENERAL: Healthy, alert and no distress  EYES: Eyes grossly normal to inspection.  No discharge or erythema, or obvious scleral/conjunctival abnormalities.  RESP: No audible wheeze, cough, or visible cyanosis.  No visible retractions or increased work of breathing.    SKIN: Visible skin clear. No significant rash, abnormal pigmentation or lesions.  NEURO: Cranial nerves grossly intact.  Mentation and speech appropriate for age.  PSYCH: Mentation appears normal, affect normal/bright, judgement and insight intact, normal speech and appearance  well-gromyla.        Video-Visit Details    Video Start Time: 3:36    Type of service:  Video Visit    Video End Time:3:43 PM    Originating Location (pt. Location): Home    Distant Location (provider location):  [facility]     Platform used for Video Visit: [employer]

## 2022-09-03 ENCOUNTER — HEALTH MAINTENANCE LETTER (OUTPATIENT)
Age: 32
End: 2022-09-03

## 2022-11-03 ENCOUNTER — MYC MEDICAL ADVICE (OUTPATIENT)
Dept: FAMILY MEDICINE | Facility: CLINIC | Age: 32
End: 2022-11-03

## 2022-11-07 NOTE — TELEPHONE ENCOUNTER
[employer] response sent and closing encounter.    Diana Groves RN  [facility]

## 2022-12-09 ASSESSMENT — ENCOUNTER SYMPTOMS
SHORTNESS OF BREATH: 0
MYALGIAS: 0
ARTHRALGIAS: 0
CHILLS: 0
HEARTBURN: 0
NERVOUS/ANXIOUS: 0
NAUSEA: 0
FEVER: 0
HEMATOCHEZIA: 0
COUGH: 0
CONSTIPATION: 0
PALPITATIONS: 0
EYE PAIN: 0
FREQUENCY: 0
DIZZINESS: 0
BREAST MASS: 0
DYSURIA: 0
HEMATURIA: 0
DIARRHEA: 0
SORE THROAT: 0
WEAKNESS: 0
PARESTHESIAS: 0
HEADACHES: 0
ABDOMINAL PAIN: 0
JOINT SWELLING: 0

## 2022-12-16 ENCOUNTER — OFFICE VISIT (OUTPATIENT)
Dept: FAMILY MEDICINE | Facility: CLINIC | Age: 32
End: 2022-12-16
Payer: COMMERCIAL

## 2022-12-16 VITALS
TEMPERATURE: 97.9 F | BODY MASS INDEX: 23.71 KG/M2 | RESPIRATION RATE: 16 BRPM | HEIGHT: 63 IN | WEIGHT: 133.8 LBS | HEART RATE: 81 BPM | DIASTOLIC BLOOD PRESSURE: 62 MMHG | SYSTOLIC BLOOD PRESSURE: 98 MMHG

## 2022-12-16 DIAGNOSIS — Z11.59 NEED FOR HEPATITIS C SCREENING TEST: ICD-10-CM

## 2022-12-16 DIAGNOSIS — Z00.00 ROUTINE GENERAL MEDICAL EXAMINATION AT A HEALTH CARE FACILITY: Primary | ICD-10-CM

## 2022-12-16 PROCEDURE — 99395 PREV VISIT EST AGE 18-39: CPT | Performed by: NURSE PRACTITIONER

## 2022-12-16 RX ORDER — BUPROPION HYDROCHLORIDE 150 MG/1
TABLET ORAL
COMMUNITY
Start: 2022-07-07 | End: 2022-12-16

## 2022-12-16 ASSESSMENT — ENCOUNTER SYMPTOMS
DIZZINESS: 0
PARESTHESIAS: 0
DIARRHEA: 0
NAUSEA: 0
PALPITATIONS: 0
FEVER: 0
EYE PAIN: 0
BREAST MASS: 0
NERVOUS/ANXIOUS: 0
SORE THROAT: 0
MYALGIAS: 0
WEAKNESS: 0
SHORTNESS OF BREATH: 0
ARTHRALGIAS: 0
HEMATURIA: 0
HEADACHES: 0
DYSURIA: 0
FREQUENCY: 0
JOINT SWELLING: 0
CONSTIPATION: 0
CHILLS: 0
COUGH: 0
HEARTBURN: 0
ABDOMINAL PAIN: 0
HEMATOCHEZIA: 0

## 2022-12-16 ASSESSMENT — PAIN SCALES - GENERAL: PAINLEVEL: NO PAIN (0)

## 2022-12-16 NOTE — PATIENT INSTRUCTIONS
Preventive Health Recommendations  Female Ages 26 - 39  Yearly exam:   See your health care provider every year in order to  Review health changes.   Discuss preventive care.    Review your medicines if you your doctor has prescribed any.    After age 30: Talk to your doctor about whether you should have a Pap test with HPV screening every 5 years.   You do not need a Pap test if your uterus was removed (hysterectomy) and you have not had cancer.  You should be tested each year for STDs (sexually transmitted diseases), if you're at risk.   Talk to your provider about how often to have your cholesterol checked.  If you are at risk for diabetes, you should have a diabetes test (fasting glucose).  Shots: Get a flu shot each year. Get a tetanus shot every 10 years.   Nutrition:   Eat at least 5 servings of fruits and vegetables each day.  Eat whole-grain bread, whole-wheat pasta and brown rice instead of white grains and rice.  Get adequate Calcium and Vitamin D.     Lifestyle  Exercise at least 150 minutes a week (30 minutes a day, 5 days of the week). This will help you control your weight and prevent disease.  Limit alcohol to one drink per day.  No smoking.   Wear sunscreen to prevent skin cancer.  See your dentist every six months for an exam and cleaning.

## 2022-12-16 NOTE — PROGRESS NOTES
SUBJECTIVE:   CC: Kayla is an 32 year old who presents for preventive health visit.     Patient has been advised of split billing requirements and indicates understanding: Yes     Healthy Habits:     Getting at least 3 servings of Calcium per day:  Yes    Bi-annual eye exam:  Yes    Dental care twice a year:  Yes    Sleep apnea or symptoms of sleep apnea:  None    Diet:  Regular (no restrictions)    Frequency of exercise:  None    Taking medications regularly:  Yes    Medication side effects:  None    PHQ-2 Total Score: 1    Additional concerns today:  Yes    Today's PHQ-2 Score:   PHQ-2 ( 1999 [employer]) 12/9/2022   Q1: Little interest or pleasure in doing things 0   Q2: Feeling down, depressed or hopeless 1   PHQ-2 Score 1   PHQ-2 Total Score (12-17 Years)- Positive if 3 or more points; Administer PHQ-A if positive -   Q1: Little interest or pleasure in doing things Not at all   Q2: Feeling down, depressed or hopeless Several days   PHQ-2 Score 1     No issues with depression or anxiety that she cannot handle and is stable on her medication.    Social History     Tobacco Use     Smoking status: Former     Packs/day: 1.00     Years: 7.00     Pack years: 7.00     Types: Cigarettes     Quit date: 2012     Years since quitting: 10.9     Smokeless tobacco: Never     Tobacco comments:     quit 5-6 year ago   Substance Use Topics     Alcohol use: Yes     Comment: once a week 2-3 drinks     If you drink alcohol do you typically have >3 drinks per day or >7 drinks per week? Yes    Alcohol Use 12/16/2022   Prescreen: >3 drinks/day or >7 drinks/week? -   Prescreen: >3 drinks/day or >7 drinks/week? Yes   AUDIT SCORE  -     Reviewed orders with patient.  Reviewed health maintenance and updated orders accordingly - Yes  Labs reviewed in EPIC  BP Readings from Last 3 Encounters:   12/16/22 98/62   08/09/22 100/69   07/06/22 92/60    Wt Readings from Last 3 Encounters:   12/16/22 60.7 kg (133 lb 12.8 oz)   08/09/22 60.8 kg (134  lb)   07/06/22 60.3 kg (133 lb)               Patient Active Problem List   Diagnosis     Other depression     Family history of hypertrophic cardiomyopathy     Recurrent major depressive disorder, in partial remission (H)     Past Surgical History:   Procedure Laterality Date     AS CREATE EARDRUM OPENING,GEN ANESTH       LAPAROTOMY MINI, TUBAL LIGATION (POST PARTUM), COMBINED Bilateral 1/22/2021    Procedure: LIGATION, FALLOPIAN TUBE, POSTPARTUM;  Surgeon: Mellissa Torrez DO;  Location: [facility] OR     MOUTH SURGERY      wisdom teeth       Social History     Tobacco Use     Smoking status: Former     Packs/day: 1.00     Years: 7.00     Pack years: 7.00     Types: Cigarettes     Quit date: 2012     Years since quitting: 10.9     Smokeless tobacco: Never     Tobacco comments:     quit 5-6 year ago   Substance Use Topics     Alcohol use: Yes     Comment: once a week 2-3 drinks     Family History   Problem Relation Age of Onset     Mental Illness Mother      Depression Mother      Hypertension Father      Hypertrophic cardiomyopathy Father      Coronary Artery Disease Maternal Grandmother         MI x3     Dementia Maternal Grandfather      Diabetes Maternal Grandfather      Cancer Paternal Grandmother      Cancer Paternal Grandfather      Supraventricular tachycardia Son          Current Outpatient Medications   Medication Sig Dispense Refill     buPROPion (WELLBUTRIN) 100 MG tablet Take 1.5 tablets (150 mg) by mouth daily (Patient taking differently: Take 100 mg by mouth daily) 135 tablet 3     escitalopram (LEXAPRO) 20 MG tablet Take 1 tablet (20 mg) by mouth daily 90 tablet 3     drospirenone-ethinyl estradiol (DAY) 3-0.03 MG tablet Take 1 tablet by mouth daily (Patient not taking: Reported on 9/2/2022) 28 tablet 3     Vitamin D, Cholecalciferol, 25 MCG (1000 UT) CAPS Take 50,000 Int'l Units/L by mouth once a week (Patient not taking: Reported on 9/2/2022)       Allergies   Allergen Reactions     Tylenol W/Codeine  [Acetaminophen-Codeine] Other (See Comments)     Causes lower back pain     Breast Cancer Screening:    Breast CA Risk Assessment (FHS-7) 12/9/2022   Do you have a family history of breast, colon, or ovarian cancer? No / Unknown     Mammogram Screening: Recommended annual mammography  Pertinent mammograms are reviewed under the imaging tab.    History of abnormal Pap smear: NO - age 30-65 PAP every 5 years with negative HPV co-testing recommended  PAP / HPV Latest Ref Rng & Units 3/5/2021 7/3/2018 2/17/2015   PAP - - - Negative for squamous intraepithelial lesion or malignancy  Electronically signed by Candice Guo CT (ASCP) on 2/18/2015 at  1:51 PM     PAP (Historical) - NIL NIL -   HPV16 NEG:Negative Negative Negative -   HPV18 NEG:Negative Negative Negative -   HRHPV NEG:Negative Negative Negative -     Reviewed and updated as needed this visit by clinical staff   Tobacco  Allergies  Meds  Problems  Med Hx  Surg Hx  Fam Hx  Soc   Hx        Reviewed and updated as needed this visit by Provider   Tobacco  Allergies  Meds  Problems  Med Hx  Surg Hx  Fam Hx  Soc   Hx       Past Medical History:   Diagnosis Date     Carpal tunnel syndrome     during pregnancy     Chickenpox      Depression       Past Surgical History:   Procedure Laterality Date     AS CREATE EARDRUM OPENING,GEN ANESTH       LAPAROTOMY MINI, TUBAL LIGATION (POST PARTUM), COMBINED Bilateral 1/22/2021    Procedure: LIGATION, FALLOPIAN TUBE, POSTPARTUM;  Surgeon: Mellissa Torrez DO;  Location: [place] OR     MOUTH SURGERY      wisdom teeth       Review of Systems   Constitutional: Negative for chills and fever.   HENT: Negative for congestion, ear pain, hearing loss and sore throat.    Eyes: Negative for pain and visual disturbance.   Respiratory: Negative for cough and shortness of breath.    Cardiovascular: Negative for chest pain, palpitations and peripheral edema.   Gastrointestinal: Negative for abdominal pain, constipation,  "diarrhea, heartburn, hematochezia and nausea.   Breasts:  Negative for tenderness, breast mass and discharge.   Genitourinary: Negative for dysuria, frequency, genital sores, hematuria, pelvic pain, urgency, vaginal bleeding and vaginal discharge.   Musculoskeletal: Negative for arthralgias, joint swelling and myalgias.   Skin: Negative for rash.   Neurological: Negative for dizziness, weakness, headaches and paresthesias.   Psychiatric/Behavioral: Negative for mood changes. The patient is not nervous/anxious.       OBJECTIVE:   BP 98/62   Pulse 81   Temp 97.9  F (36.6  C) (Tympanic)   Resp 16   Ht 1.59 m (5' 2.6\")   Wt 60.7 kg (133 lb 12.8 oz)   BMI 24.01 kg/m    Physical Exam  GENERAL: healthy, alert and no distress  EYES: Eyes grossly normal to inspection, PERRL and conjunctivae and sclerae normal  HENT: ear canals and TM's normal, nose and mouth without ulcers or lesions  NECK: no adenopathy, no asymmetry, masses, or scars and thyroid normal to palpation  RESP: lungs clear to auscultation - no rales, rhonchi or wheezes  BREAST: normal without masses, tenderness or nipple discharge and no palpable axillary masses or adenopathy  CV: regular rate and rhythm, normal S1 S2, no S3 or S4, no murmur, click or rub, no peripheral edema and peripheral pulses strong  ABDOMEN: soft, nontender, no hepatosplenomegaly, no masses and bowel sounds normal  MS: no gross musculoskeletal defects noted, no edema  SKIN: no suspicious lesions or rashes  NEURO: Normal strength and tone, mentation intact and speech normal  PSYCH: mentation appears normal, affect normal/bright  LYMPH: normal ant/post cervical, supraclavicular nodes    Diagnostic Test Results:  Labs reviewed in Epic    ASSESSMENT/PLAN:   (Z00.00) Routine general medical examination at a health care facility  (primary encounter diagnosis)  Comment: Clinical history and exam normal.  Up to date on PAP and immunizations.  Recommended checking with insurance for Hep C " screening coverage and get lab completed at her convenience.  Follow-up in 1 year for recheck.    (Z11.59) Need for hepatitis C screening test  Plan: Hepatitis C Screen Reflex to HCV RNA Quant and         Genotype    COUNSELING:  Reviewed preventive health counseling, as reflected in patient instructions       Regular exercise       Healthy diet/nutrition       Vision screening       Immunizations    Up to date       Osteoporosis prevention/bone health       Consider Hep C screening for all patients one time for ages 18-79 years    She reports that she quit smoking about 10 years ago. Her smoking use included cigarettes. She has a 7.00 pack-year smoking history. She has never used smokeless tobacco.    Cass Longoria NP  [facility]

## 2023-10-26 ENCOUNTER — TELEPHONE (OUTPATIENT)
Dept: FAMILY MEDICINE | Facility: CLINIC | Age: 33
End: 2023-10-26
Payer: COMMERCIAL

## 2023-10-29 NOTE — TELEPHONE ENCOUNTER
Form completed, signed, and My Chart note sent to patient that form is ready for . Copy of form sent to scan and copy placed in cabinet.

## 2023-12-20 ASSESSMENT — ANXIETY QUESTIONNAIRES
5. BEING SO RESTLESS THAT IT IS HARD TO SIT STILL: SEVERAL DAYS
3. WORRYING TOO MUCH ABOUT DIFFERENT THINGS: SEVERAL DAYS
2. NOT BEING ABLE TO STOP OR CONTROL WORRYING: NOT AT ALL
7. FEELING AFRAID AS IF SOMETHING AWFUL MIGHT HAPPEN: NOT AT ALL
6. BECOMING EASILY ANNOYED OR IRRITABLE: MORE THAN HALF THE DAYS
GAD7 TOTAL SCORE: 5
4. TROUBLE RELAXING: SEVERAL DAYS
GAD7 TOTAL SCORE: 5
1. FEELING NERVOUS, ANXIOUS, OR ON EDGE: NOT AT ALL
IF YOU CHECKED OFF ANY PROBLEMS ON THIS QUESTIONNAIRE, HOW DIFFICULT HAVE THESE PROBLEMS MADE IT FOR YOU TO DO YOUR WORK, TAKE CARE OF THINGS AT HOME, OR GET ALONG WITH OTHER PEOPLE: SOMEWHAT DIFFICULT

## 2023-12-21 ASSESSMENT — PATIENT HEALTH QUESTIONNAIRE - PHQ9
SUM OF ALL RESPONSES TO PHQ QUESTIONS 1-9: 5
10. IF YOU CHECKED OFF ANY PROBLEMS, HOW DIFFICULT HAVE THESE PROBLEMS MADE IT FOR YOU TO DO YOUR WORK, TAKE CARE OF THINGS AT HOME, OR GET ALONG WITH OTHER PEOPLE: SOMEWHAT DIFFICULT
SUM OF ALL RESPONSES TO PHQ QUESTIONS 1-9: 5

## 2023-12-22 ENCOUNTER — VIRTUAL VISIT (OUTPATIENT)
Dept: FAMILY MEDICINE | Facility: CLINIC | Age: 33
End: 2023-12-22
Payer: COMMERCIAL

## 2023-12-22 DIAGNOSIS — F10.29 ALCOHOL DEPENDENCE WITH UNSPECIFIED ALCOHOL-INDUCED DISORDER (H): Primary | ICD-10-CM

## 2023-12-22 DIAGNOSIS — F33.41 RECURRENT MAJOR DEPRESSIVE DISORDER, IN PARTIAL REMISSION (H): ICD-10-CM

## 2023-12-22 PROCEDURE — 99214 OFFICE O/P EST MOD 30 MIN: CPT | Mod: VID | Performed by: FAMILY MEDICINE

## 2023-12-22 RX ORDER — ESCITALOPRAM OXALATE 20 MG/1
20 TABLET ORAL DAILY
Qty: 90 TABLET | Refills: 3 | Status: SHIPPED | OUTPATIENT
Start: 2023-12-22

## 2023-12-22 RX ORDER — NALTREXONE HYDROCHLORIDE 50 MG/1
50 TABLET, FILM COATED ORAL DAILY
Qty: 30 TABLET | Refills: 11 | Status: SHIPPED | OUTPATIENT
Start: 2023-12-22 | End: 2024-01-16

## 2023-12-22 RX ORDER — BUPROPION HYDROCHLORIDE 100 MG/1
100 TABLET ORAL DAILY
Qty: 90 TABLET | Refills: 3 | Status: SHIPPED | OUTPATIENT
Start: 2023-12-22 | End: 2024-06-13

## 2023-12-22 NOTE — PROGRESS NOTES
Kayla is a 33 year old who is being evaluated via a billable video visit.      How would you like to obtain your AVS? MyChart  If the video visit is dropped, the invitation should be resent by: Text to cell phone: [phone]  Will anyone else be joining your video visit? No          Assessment & Plan     Recurrent major depressive disorder, in partial remission (H24)  Stable. Increased stress recently with her two children less than 5 years old. Not interested in therapy at this time. Continue on wellbutrin 100 mg daily and also lexapro 20 mg daily. Plan to follow up in 1 month time   - buPROPion (WELLBUTRIN) 100 MG tablet  Dispense: 90 tablet; Refill: 3  - escitalopram (LEXAPRO) 20 MG tablet  Dispense: 90 tablet; Refill: 3    Alcohol dependence with unspecified alcohol-induced disorder (H)  Drinking 3-5 beers per night. Wishes to cut down on this. Plan to check hepatic panel. If liver enzymes not significantly elevated plan to start on naltrexone 50 mg daily for cravings.   - Hepatic panel (Albumin, ALT, AST, Bili, Alk Phos, TP)  - naltrexone (DEPADE/REVIA) 50 MG tablet  Dispense: 30 tablet; Refill: 11    The risks, benefits and treatment options of prescribed medications or other treatments have been discussed with the patient. The patient verbalized their understanding and should call or follow up if no improvement or if they develop further problems.      Follow up with myself in 1 month or sooner if needed.       Brad Lopez, [facility]    Subjective   Kayla is a 33 year old, presenting for the following health issues:  No chief complaint on file.      12/22/2023     3:40 PM   Additional Questions   Roomed by Esther Avila   Accompanied by self         12/22/2023     3:40 PM   Patient Reported Additional Medications   Patient reports taking the following new medications none       History of Present Illness       Mental Health Follow-up:  Patient presents to follow-up on  Depression & Anxiety.Patient's depression since last visit has been:  Medium  The patient is not having other symptoms associated with depression.  Patient's anxiety since last visit has been:  Medium  The patient is not having other symptoms associated with anxiety.  Any significant life events: No  Patient is not feeling anxious or having panic attacks.  Patient has concerns about alcohol or drug use.    She eats 2-3 servings of fruits and vegetables daily.She consumes 1 sweetened beverage(s) daily.She exercises with enough effort to increase her heart rate 10 to 19 minutes per day.  She exercises with enough effort to increase her heart rate 3 or less days per week. She is missing 1 dose(s) of medications per week.  She is not taking prescribed medications regularly due to remembering to take.       Wellbutrin 100 mg daily.   Escitalopram 20 mg daily.   No current therapist.-- reports financially can't afford this.   Alcohol drinks about 3 beers nightly.   No morning alcohol use.   Has been having increased stress at home as two children under age of 5.         Review of Systems   Constitutional, HEENT, cardiovascular, pulmonary, gi and gu systems are negative, except as otherwise noted.      Objective           Vitals:  No vitals were obtained today due to virtual visit.    Physical Exam   GENERAL: Healthy, alert and no distress  EYES: Eyes grossly normal to inspection.  No discharge or erythema, or obvious scleral/conjunctival abnormalities.  RESP: No audible wheeze, cough, or visible cyanosis.  No visible retractions or increased work of breathing.    SKIN: Visible skin clear. No significant rash, abnormal pigmentation or lesions.  NEURO: Cranial nerves grossly intact.  Mentation and speech appropriate for age.  PSYCH: Mentation appears normal, affect normal/bright, judgement and insight intact, normal speech and appearance well-groomed.        Video-Visit Details    Type of service:  Video Visit      Originating Location (pt. Location): Home    Distant Location (provider location):  On-site  Platform used for Video Visit: Rebel

## 2023-12-22 NOTE — PATIENT INSTRUCTIONS
Continue on wellbutrin 100 mg daily.   Continue on escitalopram 20 mg daily.       Schedule lab visit for blood draw.   If labs look okay then start on naltrexone 50 mg daily for alcohol cravings.    Follow up with myself in 1 month.

## 2024-01-05 ENCOUNTER — LAB (OUTPATIENT)
Dept: LAB | Facility: CLINIC | Age: 34
End: 2024-01-05
Payer: COMMERCIAL

## 2024-01-05 DIAGNOSIS — F10.29 ALCOHOL DEPENDENCE WITH UNSPECIFIED ALCOHOL-INDUCED DISORDER (H): ICD-10-CM

## 2024-01-05 LAB
ALBUMIN SERPL BCG-MCNC: 4.5 G/DL (ref 3.5–5.2)
ALP SERPL-CCNC: 44 U/L (ref 40–150)
ALT SERPL W P-5'-P-CCNC: 15 U/L (ref 0–50)
AST SERPL W P-5'-P-CCNC: 18 U/L (ref 0–45)
BILIRUB DIRECT SERPL-MCNC: <0.2 MG/DL (ref 0–0.3)
BILIRUB SERPL-MCNC: 0.3 MG/DL
PROT SERPL-MCNC: 7.1 G/DL (ref 6.4–8.3)

## 2024-01-05 PROCEDURE — 80076 HEPATIC FUNCTION PANEL: CPT

## 2024-01-05 PROCEDURE — 36415 COLL VENOUS BLD VENIPUNCTURE: CPT

## 2024-01-14 ENCOUNTER — MYC MEDICAL ADVICE (OUTPATIENT)
Dept: FAMILY MEDICINE | Facility: CLINIC | Age: 34
End: 2024-01-14
Payer: COMMERCIAL

## 2024-01-24 ENCOUNTER — OFFICE VISIT (OUTPATIENT)
Dept: FAMILY MEDICINE | Facility: CLINIC | Age: 34
End: 2024-01-24
Payer: COMMERCIAL

## 2024-01-24 VITALS
OXYGEN SATURATION: 97 % | TEMPERATURE: 98.3 F | SYSTOLIC BLOOD PRESSURE: 108 MMHG | WEIGHT: 143 LBS | BODY MASS INDEX: 25.34 KG/M2 | DIASTOLIC BLOOD PRESSURE: 71 MMHG | HEIGHT: 63 IN | HEART RATE: 81 BPM | RESPIRATION RATE: 20 BRPM

## 2024-01-24 DIAGNOSIS — F33.41 RECURRENT MAJOR DEPRESSIVE DISORDER, IN PARTIAL REMISSION (H): ICD-10-CM

## 2024-01-24 DIAGNOSIS — F10.29 ALCOHOL DEPENDENCE WITH UNSPECIFIED ALCOHOL-INDUCED DISORDER (H): ICD-10-CM

## 2024-01-24 PROCEDURE — 99214 OFFICE O/P EST MOD 30 MIN: CPT | Performed by: FAMILY MEDICINE

## 2024-01-24 RX ORDER — ACAMPROSATE CALCIUM 333 MG/1
666 TABLET, DELAYED RELEASE ORAL 3 TIMES DAILY
Qty: 180 TABLET | Refills: 2 | Status: CANCELLED | OUTPATIENT
Start: 2024-01-24

## 2024-01-24 RX ORDER — GABAPENTIN 600 MG/1
TABLET ORAL
Qty: 45 TABLET | Refills: 0 | Status: SHIPPED | OUTPATIENT
Start: 2024-01-24 | End: 2024-02-23

## 2024-01-24 ASSESSMENT — PATIENT HEALTH QUESTIONNAIRE - PHQ9
SUM OF ALL RESPONSES TO PHQ QUESTIONS 1-9: 5
5. POOR APPETITE OR OVEREATING: SEVERAL DAYS

## 2024-01-24 ASSESSMENT — PAIN SCALES - GENERAL: PAINLEVEL: NO PAIN (0)

## 2024-01-24 ASSESSMENT — ANXIETY QUESTIONNAIRES
3. WORRYING TOO MUCH ABOUT DIFFERENT THINGS: SEVERAL DAYS
1. FEELING NERVOUS, ANXIOUS, OR ON EDGE: NOT AT ALL
6. BECOMING EASILY ANNOYED OR IRRITABLE: MORE THAN HALF THE DAYS
GAD7 TOTAL SCORE: 4
2. NOT BEING ABLE TO STOP OR CONTROL WORRYING: NOT AT ALL
7. FEELING AFRAID AS IF SOMETHING AWFUL MIGHT HAPPEN: NOT AT ALL
5. BEING SO RESTLESS THAT IT IS HARD TO SIT STILL: NOT AT ALL
IF YOU CHECKED OFF ANY PROBLEMS ON THIS QUESTIONNAIRE, HOW DIFFICULT HAVE THESE PROBLEMS MADE IT FOR YOU TO DO YOUR WORK, TAKE CARE OF THINGS AT HOME, OR GET ALONG WITH OTHER PEOPLE: SOMEWHAT DIFFICULT
GAD7 TOTAL SCORE: 4

## 2024-01-24 NOTE — PATIENT INSTRUCTIONS
Utilize gabapentin 300 mg daily for 2 days, then 300 mg twice daily for 2 days, then 300 mg three times daily.    Follow up with addiction medicine.     Reach out to me in 1 month if not able to get in with addiction medicine prior to this.

## 2024-01-24 NOTE — PROGRESS NOTES
"  Assessment & Plan     Recurrent major depressive disorder, in partial remission (H24)  Mood fairly well controlled on Lexapro and Wellbutrin.     Alcohol dependence with unspecified alcohol-induced disorder (H)  Drinking 3-5 beers per night. Did not tolerate naltrexone due to side effects. Not interested in acamprosate. Wishes to see addiction medicine. Referral placed.   -- start on gabapentin 300 mg daily x2 days, then 300 mg BID x2 days, then 300 mg TID and stick with this dose. She will follow up with myself in 4 weeks if not able to get in with addiction medicine prior to this. Can consider further up titration of the gabapentin if needed.   - Adult Mental Health  Referral  - gabapentin (NEURONTIN) 600 MG tablet  Dispense: 45 tablet; Refill: 0    The risks, benefits and treatment options of prescribed medications or other treatments have been discussed with the patient. The patient verbalized their understanding and should call or follow up if no improvement or if they develop further problems.      Valerio Garza is a 33 year old, presenting for the following health issues:  Addiction Problem        1/24/2024     8:10 AM   Additional Questions   Roomed by Rolanda COLORADO     History of Present Illness       Reason for visit:  Troubles with alcohol    She eats 0-1 servings of fruits and vegetables daily.She consumes 2 sweetened beverage(s) daily.She exercises with enough effort to increase her heart rate 30 to 60 minutes per day.  She exercises with enough effort to increase her heart rate 5 days per week. She is missing 1 dose(s) of medications per week.       33-year-old female who presents to clinic for follow-up on mood.  Patient with a recent virtual visit on 12/22/2023 at that time started on naltrexone for alcohol dependence.       Today in clinic:     Did not tolerate the naltrexone caused vomiting, lack of energy, and felt like she was \"high\" on this medication.   Drinking about 3-5 beers " "nightly. 12 ounce beers. Drinking michelob.   Main stressors are husbands, kids ( 2 boys younger than 5) and  work.   Reports not having much down time or free time for her self.    is not real helpful around the home.         Objective    /71 (BP Location: Right arm, Patient Position: Chair, Cuff Size: Adult Regular)   Pulse 81   Temp 98.3  F (36.8  C) (Oral)   Resp 20   Ht 1.607 m (5' 3.25\")   Wt 64.9 kg (143 lb)   LMP 01/04/2024 (Approximate)   SpO2 97%   BMI 25.13 kg/m    Body mass index is 25.13 kg/m .  Physical Exam   General: alert, cooperative, no acute distress   CV: RRR, no murmur  Resp: non-labored breathing, clear to auscultation, no wheezing or rales   Extremities: No peripheral edema, calves non-tender.   Appearance: Patient appears well groomed and appropriately dressed.   Orientation: Patient alert and oriented to person, place, and time.  Behavior: Appropriate to setting and is cooperative with mental status examination. No overactivity or catatonia.  Speech: Organized, clear, and concise, with appropriate volume and tone.  Thought: Able to think abstractly. No evidence of tangentiality or circumstantiality.  Perception: No auditory or visual hallucinations. No delusions.  Mood: No evidence of euphoria or dysphoria. No lability.  Affect: Congruent to mood. Not flattened or narrowed.  Insight/Judgement: Insight and judgment intact.        Signed Electronically by: Brad Lopez,     "

## 2024-02-01 ENCOUNTER — TELEPHONE (OUTPATIENT)
Dept: BEHAVIORAL HEALTH | Facility: CLINIC | Age: 34
End: 2024-02-01
Payer: COMMERCIAL

## 2024-02-01 ENCOUNTER — VIRTUAL VISIT (OUTPATIENT)
Dept: ADDICTION MEDICINE | Facility: CLINIC | Age: 34
End: 2024-02-01
Attending: FAMILY MEDICINE
Payer: COMMERCIAL

## 2024-02-01 DIAGNOSIS — F33.41 RECURRENT MAJOR DEPRESSIVE DISORDER, IN PARTIAL REMISSION (H): ICD-10-CM

## 2024-02-01 DIAGNOSIS — F10.20 ALCOHOL USE DISORDER, SEVERE, DEPENDENCE (H): Primary | ICD-10-CM

## 2024-02-01 PROBLEM — F10.29 ALCOHOL DEPENDENCE WITH UNSPECIFIED ALCOHOL-INDUCED DISORDER (H): Status: RESOLVED | Noted: 2023-12-22 | Resolved: 2024-02-01

## 2024-02-01 PROCEDURE — 99205 OFFICE O/P NEW HI 60 MIN: CPT | Mod: 95

## 2024-02-01 RX ORDER — ACAMPROSATE CALCIUM 333 MG/1
666 TABLET, DELAYED RELEASE ORAL 3 TIMES DAILY
Qty: 180 TABLET | Refills: 1 | Status: SHIPPED | OUTPATIENT
Start: 2024-02-01 | End: 2024-04-01

## 2024-02-01 ASSESSMENT — PAIN SCALES - GENERAL: PAINLEVEL: NO PAIN (0)

## 2024-02-01 NOTE — PROGRESS NOTES
"Virtual Visit Details    Type of service:  Video Visit     Joined the call at 2/1/2024, 11:26:54 am.  Left the call at 2/1/2024, 12:05:27 pm.    Originating Location (pt. Location): Other work    Distant Location (provider location):  Off-site  Platform used for Video Visit: Rebel TREVIÑO                                                      Lanie Martin is a 33 year old female who presents for  initial visit for addiction consultation and management referred by Brad Lopez  due to concerns for Alcohol Use Disorder (AUD)    Visit performed Virtual, via video    HPI:   Lanie Martin is a 33 year old female with history of anxiety and depression use who presents for further evaluation of possible substance use disorder and management options.    Started drinking since 15 years, normal party stuff at young age, weekend.  Has worsened in last year, currently drinking daily Stress from  and kids.   Two kids 3 and 5. Typically drinking fireball at work, and drinking at home 3-5 beers/day. When doesn't drink feels anxiety, irritability.     Kayla was diagnosed with ADHD and depression as a kid, took meds, stopped meds as a teenager.   Current meds for depression, Lexapro and wellbutrin.  Started 6-7 years ago,  Wellbutrin started in last 2-3  years.     Tried Naltrexone full dose on a few different occasions, within 30 minutes vomiting and feeling high, and fatigue. Does not want to retry  Two boys, Hayder, and Ace, lives with  (not supportive or particularly helpful with kids)     Substance Use History:   \"Have you ever had any history with [...] use?\" And \"When was your last use?  ALCOHOL - daily  CANNABIS - daily  PRESCRIPTION STIMULANTS (includes Ritalin, Adderall, Vyvanse) - denies  COCAINE/CRACK - denies  METH/AMPHETAMINES (includes ecstacy, MDMA/alona) - denies  OPIATES - denies  BENZODIAZEPINES (includes Ativan, Klonopin, Xanax) - denies  KRATOM (mild opioid and stimulant " "effects) - denies  KETAMINE - denies  HALLUCINOGENS (includes DXM) - denies  BEHAVIORAL (Gambling, Eating d/o, Compulsivity) -   History of treatment -   NICOTINE  Cigarettes:   Chew/snus:   Vaping:   Past NRT/medication use:       Previous withdrawal treatment episodes (e.g. detox):   Previous MISBAH treatment programs: none  Hospitalizations or overdose: denies  Medical complications from substance use: denies  IV Drug use?: denies  Previous Medication for Addiction Tx: naltrexone  Longest period of full abstinence:   Activities that have previously supported abstinence:   Current Recovery Activities:       Infectious disease screening  Hep C:  No results found for: \"HCVAB\"    HIV:    HIV Antigen Antibody Combo   Date Value Ref Range Status   06/18/2020 Nonreactive NR^Nonreactive     Final     Comment:     HIV-1 p24 Ag & HIV-1/HIV-2 Ab Not Detected           Pregnancy Status PPTL  (if no HcG in ED and patient is of childbearing years, please offer urine HcG)  LMP:   Sexually active:   Birth control/barriers:     Psychiatric History (per patient report and problem list review)  Past diagnoses - anxiety  Current or past psychiatrist: depression  Current or past therapist:    Hospitalizations/TMS/ECT -   Suicide Attempts -   Medication trials -       PHQ-9 scores:      12/21/2023     7:16 PM 1/24/2024     8:15 AM 2/1/2024    11:02 AM   PHQ   PHQ-9 Total Score 5 5 5   Q9: Thoughts of better off dead/self-harm past 2 weeks Not at all Not at all Not at all     TRINITY-7 scores:      8/28/2022     1:11 PM 12/20/2023     3:25 PM 1/24/2024     8:15 AM   TRINITY-7 SCORE   Total Score 4 (minimal anxiety) 5 (mild anxiety)    Total Score 4 5 4         SOCIAL HISTORY:  Housing status: with  and two boys 5 and 3  Employment status: Employed full time  Relationship status:   Children: 2  Legal concerns related to use: denies  Contact information up to date? denies    3rd Party Involvement  (please obtain TREY if pt would like " to include)      Medical History:    Patient Active Problem List    Diagnosis Date Noted    Alcohol dependence with unspecified alcohol-induced disorder (H) 12/22/2023     Priority: Medium    Recurrent major depressive disorder, in partial remission (H24) 09/02/2022     Priority: Medium    Family history of hypertrophic cardiomyopathy 05/10/2022     Priority: Medium    Other depression 03/19/2019     Priority: Medium       Past Medical History:   Diagnosis Date    Carpal tunnel syndrome     during pregnancy    Chickenpox     Depression        Past Surgical History:   Procedure Laterality Date    AS CREATE EARDRUM OPENING,GEN ANESTH      LAPAROTOMY MINI, TUBAL LIGATION (POST PARTUM), COMBINED Bilateral 1/22/2021    Procedure: LIGATION, FALLOPIAN TUBE, POSTPARTUM;  Surgeon: Mellissa Torrez DO;  Location: [facility] OR    MOUTH SURGERY      wisdom teeth         Family History   Problem Relation Age of Onset    Mental Illness Mother     Depression Mother     Hypertension Father     Hypertrophic cardiomyopathy Father     Coronary Artery Disease Maternal Grandmother         MI x3    Dementia Maternal Grandfather     Diabetes Maternal Grandfather     Cancer Paternal Grandmother     Cancer Paternal Grandfather     Supraventricular tachycardia Son          Current Outpatient Medications   Medication Sig Dispense Refill    buPROPion (WELLBUTRIN) 100 MG tablet Take 1 tablet (100 mg) by mouth daily 90 tablet 3    escitalopram (LEXAPRO) 20 MG tablet Take 1 tablet (20 mg) by mouth daily 90 tablet 3    gabapentin (NEURONTIN) 600 MG tablet Take 0.5 tablets (300 mg) by mouth daily for 2 days, THEN 0.5 tablets (300 mg) 2 times daily for 2 days, THEN 0.5 tablets (300 mg) 3 times daily for 28 days. 45 tablet 0         Allergies   Allergen Reactions    Tylenol W/Codeine [Acetaminophen-Codeine] Other (See Comments)     Causes lower back pain           OBJECTIVE                                                      EXAM    LMP 01/04/2024  "(Approximate)     GENERAL: healthy, alert and no distress  EYES: Eyes grossly normal to inspection, PERRL and conjunctivae and sclerae normal  RESP: No respiratory distress  MENTAL STATUS EXAM  Appearance/Behavior: No appearant distress  Speech: Normal  Mood/Affect: normal affect  Insight: Fair      LAB  Recent UDS Labs (may not contain today's lab data)  No results found for: \"BUP\", \"BZO\", \"BAR\", \"YOUSUF\", \"MAMP\", \"AMP\", \"MDMA\", \"MTD\", \"JNE770\", \"OXY\", \"PCP\", \"THC\", \"TEMP\", \"SGPOCT\"        Hepatic Function  AST   Date Value Ref Range Status   01/05/2024 18 0 - 45 U/L Final     Comment:     Reference intervals for this test were updated on 6/12/2023 to more accurately reflect our healthy population. There may be differences in the flagging of prior results with similar values performed with this method. Interpretation of those prior results can be made in the context of the updated reference intervals.   05/28/2020 13 0 - 45 U/L Final     ALT   Date Value Ref Range Status   01/05/2024 15 0 - 50 U/L Final     Comment:     Reference intervals for this test were updated on 6/12/2023 to more accurately reflect our healthy population. There may be differences in the flagging of prior results with similar values performed with this method. Interpretation of those prior results can be made in the context of the updated reference intervals.     05/28/2020 20 0 - 50 U/L Final     Bilirubin Total   Date Value Ref Range Status   01/05/2024 0.3 <=1.2 mg/dL Final   05/28/2020 0.2 0.2 - 1.3 mg/dL Final     Albumin   Date Value Ref Range Status   01/05/2024 4.5 3.5 - 5.2 g/dL Final   05/28/2020 4.1 3.4 - 5.0 g/dL Final       CBC  WBC   Date Value Ref Range Status   01/20/2021 13.3 (H) 4.0 - 11.0 10e9/L Final     WBC Count   Date Value Ref Range Status   05/10/2022 3.5 (L) 4.0 - 11.0 10e3/uL Final     RBC Count   Date Value Ref Range Status   05/10/2022 4.22 3.80 - 5.20 10e6/uL Final   01/20/2021 3.92 3.8 - 5.2 10e12/L Final "     Hemoglobin   Date Value Ref Range Status   05/10/2022 13.0 11.7 - 15.7 g/dL Final   01/20/2021 12.3 11.7 - 15.7 g/dL Final     Hematocrit   Date Value Ref Range Status   05/10/2022 39.8 35.0 - 47.0 % Final   01/20/2021 35.3 35.0 - 47.0 % Final     MCV   Date Value Ref Range Status   05/10/2022 94 78 - 100 fL Final   01/20/2021 90 78 - 100 fl Final     MCH   Date Value Ref Range Status   05/10/2022 30.8 26.5 - 33.0 pg Final   01/20/2021 31.4 26.5 - 33.0 pg Final     MCHC   Date Value Ref Range Status   05/10/2022 32.7 31.5 - 36.5 g/dL Final   01/20/2021 34.8 31.5 - 36.5 g/dL Final     Platelet Count   Date Value Ref Range Status   05/10/2022 259 150 - 450 10e3/uL Final   01/20/2021 238 150 - 450 10e9/L Final     RDW   Date Value Ref Range Status   05/10/2022 12.2 10.0 - 15.0 % Final   01/20/2021 12.8 10.0 - 15.0 % Final       Today's lab data  No results found for any visits on 02/01/24.        [facility] Board of Pharmacy Data Base Reviewed;    Consistent with patient reports and Epic records.       DSM5 Substance Use Disorder Criteria (2-3=mild, 4-5=moderate, 6+=severe):  Substance is often taken in larger amounts OR over a longer period than was intended: Yes  There is a persistent desire OR unsuccessful efforts to cut down or control substance use: Yes  A great deal of time is spent in activities necessary to obtain the substance, use the substance, or recover from its effects: No  Craving, or a strong desire to use substances: Yes  Recurrent substance use resulting in a failure to fulfil major obligations at work, school, or home: No  Continued substance use despite having persistent or recurrent social or interpersonal problems caused or exacerbated by the effects of the substance: Yes  Important social, occupational, or recreational activities are given up or reduced because of the substance: Yes  Recurrent substance use in situations in which it is physically hazardous: No  Substance use is continued  despite knowledge of having a persistent or recurrent physical or psychological problem that is likely to have been caused or exacerbated by the substance: Yes  Tolerance, as defined by either of the following: a) a need for markedly increased amounts of the substance to achieve intoxication or desired effect. b) a markedly diminished effect with continued use of the same amount of the substance: Yes  Withdrawal, as manifested by either of the following: a) the characteristic withdrawal syndrome. b) substance (or a closely-related substance) is taken to relieve or avoid withdrawal symptoms: No      A/P                                                      ASSESSMENT/PLAN:  1. Alcohol use disorder, severe, dependence (H)  -needs improvement   - acamprosate (CAMPRAL) 333 MG EC tablet; Take 2 tablets (666 mg) by mouth 3 times daily for 60 days  Dispense: 180 tablet; Refill: 1  - Adult Mental Health  Referral; Future for MISBAH eval.  Call [phone] to schedule MISBAH eval  -encouraged 12 step recovery program such as [employer] or [email or web address]  -continue with gabapentin, titrating dosing to 600 mg TID   -3 week follow up virtual    2. Recurrent major depressive disorder, in partial remission (H24)  -no change  -continue with lexapro, and wellbutrin per pcp  -consider psychotherapy          ENCOUNTER FOR LONG TERM USE OF HIGH RISK MEDICATION   High Risk Drug Monitoring?  YES   Drug being monitored: acamprosate   Reason for drug: alcohol Use Disorder   What is being monitored?: Dosage, Cravings, Triggers and side effects       Counseled the patient on the importance of having a recovery program in addition to medication to manage recovery.  Components include avoiding isolating, having willingness to change, avoiding triggers and managing cravings. Encouraged having some type of sober network and practicing honesty with trusted support person(s). Encouraged other services such as counseling, 12 step or other  self-help organizations.          RTC         Answers submitted by the patient for this visit:  Patient Health Questionnaire (Submitted on 2/1/2024)  If you checked off any problems, how difficult have these problems made it for you to do your work, take care of things at home, or get along with other people?: Somewhat difficult  PHQ9 TOTAL SCORE: 5    Time statement  The total TIME spent on this patient on the day of the appointment was 70 minutes.  This includes time spent preparing to see the patient, reviewing history, ordering/reviewing tests, medications, communicating with and referring to other health care professionals when indicated, and documenting clinical information in Epic      Kaci Prather NP  [facility]  [phone]

## 2024-02-01 NOTE — TELEPHONE ENCOUNTER
"Pt is a(n) adult (18+ out of HS) Seeking as eval for Adult MISBAH Assessment for evaluation and recommendations..  Appointment scheduled by:  Patient.  (self-pay - complete Cost Estimate)     needed?  NO    Contact information verified/updated: Yes    Yolanda Cárdenas    \"We have scheduled your evaluation. In the event that your insurance coverage comes back as out of network, you may receive a call to cancel your appointment and direct you to your insurance company for in-network coverage.\"    Disclaimer regarding insurance read to patient?  Yes      "

## 2024-02-01 NOTE — NURSING NOTE
Is the patient currently in the state of [place]? YES    Visit mode:VIDEO    If the visit is dropped, the patient can be reconnected by: VIDEO VISIT: Text to cell phone:   Telephone Information:   Mobile [phone]       Will anyone else be joining the visit? NO  (If patient encounters technical issues they should call [phone]    How would you like to obtain your AVS? MyChart    Are changes needed to the allergy or medication list? No    Reason for visit: Consult    Tai SOLANO

## 2024-02-17 ENCOUNTER — HEALTH MAINTENANCE LETTER (OUTPATIENT)
Age: 34
End: 2024-02-17

## 2024-02-19 ASSESSMENT — PATIENT HEALTH QUESTIONNAIRE - PHQ9
10. IF YOU CHECKED OFF ANY PROBLEMS, HOW DIFFICULT HAVE THESE PROBLEMS MADE IT FOR YOU TO DO YOUR WORK, TAKE CARE OF THINGS AT HOME, OR GET ALONG WITH OTHER PEOPLE: SOMEWHAT DIFFICULT
SUM OF ALL RESPONSES TO PHQ QUESTIONS 1-9: 8
SUM OF ALL RESPONSES TO PHQ QUESTIONS 1-9: 8

## 2024-02-20 ENCOUNTER — HOSPITAL ENCOUNTER (OUTPATIENT)
Dept: BEHAVIORAL HEALTH | Facility: CLINIC | Age: 34
Discharge: HOME | End: 2024-02-20
Attending: PSYCHIATRY & NEUROLOGY | Admitting: PSYCHIATRY & NEUROLOGY
Payer: COMMERCIAL

## 2024-02-20 VITALS — BODY MASS INDEX: 24.8 KG/M2 | HEIGHT: 63 IN | WEIGHT: 140 LBS

## 2024-02-20 DIAGNOSIS — F10.20 ALCOHOL USE DISORDER, SEVERE, DEPENDENCE (H): Primary | ICD-10-CM

## 2024-02-20 DIAGNOSIS — F12.20 CANNABIS USE DISORDER, SEVERE, DEPENDENCE (H): ICD-10-CM

## 2024-02-20 PROCEDURE — H0001 ALCOHOL AND/OR DRUG ASSESS: HCPCS

## 2024-02-20 ASSESSMENT — COLUMBIA-SUICIDE SEVERITY RATING SCALE - C-SSRS
4. HAVE YOU HAD THESE THOUGHTS AND HAD SOME INTENTION OF ACTING ON THEM?: NO
6. HAVE YOU EVER DONE ANYTHING, STARTED TO DO ANYTHING, OR PREPARED TO DO ANYTHING TO END YOUR LIFE?: NO
ATTEMPT LIFETIME: NO
REASONS FOR IDEATION LIFETIME: COMPLETELY TO END OR STOP THE PAIN (YOU COULDN'T GO ON LIVING WITH THE PAIN OR HOW YOU WERE FEELING)
4. HAVE YOU HAD THESE THOUGHTS AND HAD SOME INTENTION OF ACTING ON THEM?: NO
2. HAVE YOU ACTUALLY HAD ANY THOUGHTS OF KILLING YOURSELF?: SEE ABOVE
5. HAVE YOU STARTED TO WORK OUT OR WORKED OUT THE DETAILS OF HOW TO KILL YOURSELF? DO YOU INTEND TO CARRY OUT THIS PLAN?: NO
2. HAVE YOU ACTUALLY HAD ANY THOUGHTS OF KILLING YOURSELF?: YES
1. HAVE YOU WISHED YOU WERE DEAD OR WISHED YOU COULD GO TO SLEEP AND NOT WAKE UP?: YES
2. HAVE YOU ACTUALLY HAD ANY THOUGHTS OF KILLING YOURSELF?: SEE ABOVE
TOTAL  NUMBER OF ABORTED OR SELF INTERRUPTED ATTEMPTS LIFETIME: NO
3. HAVE YOU BEEN THINKING ABOUT HOW YOU MIGHT KILL YOURSELF?: NO
REASONS FOR IDEATION PAST MONTH: COMPLETELY TO END OR STOP THE PAIN (YOU COULDN'T GO ON LIVING WITH THE PAIN OR HOW YOU WERE FEELING)
1. IN THE PAST MONTH, HAVE YOU WISHED YOU WERE DEAD OR WISHED YOU COULD GO TO SLEEP AND NOT WAKE UP?: YES
2. HAVE YOU ACTUALLY HAD ANY THOUGHTS OF KILLING YOURSELF?: YES
TOTAL  NUMBER OF INTERRUPTED ATTEMPTS LIFETIME: NO
5. HAVE YOU STARTED TO WORK OUT OR WORKED OUT THE DETAILS OF HOW TO KILL YOURSELF? DO YOU INTEND TO CARRY OUT THIS PLAN?: NO

## 2024-02-20 ASSESSMENT — PAIN SCALES - GENERAL: PAINLEVEL: NO PAIN (0)

## 2024-02-20 NOTE — PROGRESS NOTES
Safety Plan:    Adult Short Safety Plan:   Name: Lanie Martin  YOB: 1990  Date: 2/20/2024     My primary care provider: YANELIS Campbell    My primary care clinic: [facility] in [place], [place]    My prescriber: YANELIS Campbell    Other care team support:  None   My Triggers: Relationship conflict: with her , Work difficulties: stress at work, Home environment: stress in the home, and Substance Use: use of alcohol and THC/cannabis.     Additional People, Places, and Things that I can access for support: Her youngest sister would be supportive.         What is important to me and makes life worth living: Her children and taking care of her chickens.       GREEN    Good Control  1. I feel good  2. No suicidal thoughts   3. Can work, sleep and play      Action Steps  1. Self-care: balanced meals, exercising, sleep practices, etc.  2. Take your medications as prescribed.  3. Continue meetings with therapist and prescriber.  4.  Do the healthy things that I enjoy.             YELLOW  Getting Worse  I have ANY of these:  1. I do not feel good  2. Difficulty Concentrating  3. Sleep is changing  4. Increase/Change in my thoughts to hurt self and/or others, but I can still manage and not act on it.   5. Not taking care of self.  6. Continued use of alcohol and THC/cannabis.             Action Steps (in addition to the above):  1. Inform your therapist and psychiatric prescriber/PCP.  2. Keep taking your medications as prescribed.    3. Turn to people you can ask for help.  4. Use internal coping strategies -see below.  5. Create safe environment: store firearms for safety and notify friends/family of increase in symptoms             RED  Get Help  If I have ANY of these:  1. Current and uncontrollable thoughts and/or behaviors to hurt self and/or others.      Actions to manage my safety  1. Contact your emergency person: Bernabe Martin ()  Tel #:  [phone]  2. Call or Text [phone]  3. Call my crisis team- [facility] [phone]  3. Or Call 911 or go to the emergency room right away          My Internal Coping Strategies include the following:  belly breathing, arts and crafts, color, fidget toys, and use my coping skills    [End for Brief Safety Plan]     Safety Concerns  How To Identify Situations That Make Your Mental Health Worse:  Triggers are things that make your mental health worse.  Look at the list below to help you find your triggers and what you can do about them.     1. Identify Early Warning Signs:    Sometimes symptoms return, even when people do their best to stay well. Symptoms can develop over a short period of time with little or no warning, but most of the time they emerge gradually over several weeks.  Early warning signs are changes that people experience when a relapse is starting. Some early warning signs are common and others are not as common.   Common Early Warning Signs:    Feeling tense or nervous, Feeling depressed or low, Feeling irritable, Feeling like not being around other people, Trouble concentrating, and Urges to use drugs or alcohol.     2. Identify action steps to take when warning signs are noticed:    Taking Action- It is important to take action if you are experiencing early warning signs of a relapse.  The faster you act, the more likely it is that you can avoid a full relapse.  It is helpful to identify several specific ways to cope with symptoms.      The following is my list of symptoms and coping strategies that I can use when they are present:    Symptom Coping Strategies   Anxiety -Talk with someone in your support system and let him or her know how you are feeling.  -Use relaxation techniques such as deep breathing or imagery.  -Use positive affirmations to counteract negative self-talk such as  I am learning to let go of worry.    Depression - Schedule your day; include activities you have to do and  activities you enjoy doing.  - Get some exercise - walk, run, bike, or swim.  - Give yourself credit for even the smallest things you get done.   Sleep Difficulties   - Go to sleep at the same time every day.  - Do something relaxing before bed, such as drinking herbal tea or listening to music.  - Avoid having discussions about upsetting topics before going to bed.   Delusions   - Distract yourself from the disturbing thought by doing something that requires your attention such as a puzzle.  - Check out your beliefs by talking to someone you trust.    Hallucinations   - Use headphones to listen to music.  - Tell voices to  stop  or say to yourself,  I am safe.   - Ignore the hallucinations as much as possible; focus on other things.   Concentration Difficulties - Minimize distractions so there is only one thing for you to focus on at a time.    - Ask the person you are having a conversation with to slow down or repeat things you are unsure of.      The patient was provided with a paper copy of the above safety plan on 2/20/2024.    Provider Name/ Credentials:  ALLYSON Coates  2/20/2024
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
## 2024-02-23 ENCOUNTER — VIRTUAL VISIT (OUTPATIENT)
Dept: ADDICTION MEDICINE | Facility: CLINIC | Age: 34
End: 2024-02-23
Payer: COMMERCIAL

## 2024-02-23 DIAGNOSIS — F12.20 CANNABIS USE DISORDER, SEVERE, DEPENDENCE (H): ICD-10-CM

## 2024-02-23 DIAGNOSIS — F10.29 ALCOHOL DEPENDENCE WITH UNSPECIFIED ALCOHOL-INDUCED DISORDER (H): Primary | ICD-10-CM

## 2024-02-23 DIAGNOSIS — F32.89 OTHER DEPRESSION: ICD-10-CM

## 2024-02-23 PROCEDURE — 99214 OFFICE O/P EST MOD 30 MIN: CPT | Mod: 95

## 2024-02-23 RX ORDER — GABAPENTIN 600 MG/1
300-600 TABLET ORAL DAILY
Qty: 90 TABLET | Refills: 0 | Status: SHIPPED | OUTPATIENT
Start: 2024-02-23 | End: 2024-03-08

## 2024-02-23 RX ORDER — NALTREXONE HYDROCHLORIDE 50 MG/1
50 TABLET, FILM COATED ORAL DAILY
Qty: 30 TABLET | Refills: 1 | Status: SHIPPED | OUTPATIENT
Start: 2024-02-23 | End: 2024-03-08

## 2024-02-23 ASSESSMENT — PATIENT HEALTH QUESTIONNAIRE - PHQ9: SUM OF ALL RESPONSES TO PHQ QUESTIONS 1-9: 6

## 2024-02-23 NOTE — PROGRESS NOTES
Virtual Visit Details    Type of service:  Video Visit     Originating Location (pt. Location): Home    Distant Location (provider location):  On-site  Platform used for Video Visit: [employer] [facility]    A/P                                                    ASSESSMENT/PLAN  1. Alcohol dependence with unspecified alcohol-induced disorder (H)  -needs improvement  - gabapentin (NEURONTIN) 600 MG tablet; Take 0.5-1 tablets (300-600 mg) by mouth daily  Dispense: 90 tablet; Refill: 0  - naltrexone (DEPADE/REVIA) 50 MG tablet; Take 1 tablet (50 mg) by mouth daily  Dispense: 30 tablet; Refill: 1  -encouraged to retry naltrexone at 1/2 dose, with food and in evenings, and evaluate side effects.   -complete MISBAH eval   - two week follow up to evaluate naltrexone start    Future Appointments   Date Time Provider Department Center   3/8/2024 11:00 AM [facility]         2. Other depression  -no change  -continue wellbutrin     3. Cannabis use disorder, severe, dependence (H)  -no change in cannabis use  -encouraged cessation  -continue to ask, assess and advise      Problem list updated Feb 23, 2024   No problems updated.      Feb 23, 2024  - re-try naltrexone  -follow up on psychosocial supports.       Last encounter A/P  1. Alcohol use disorder, severe, dependence (H)  -needs improvement   - acamprosate (CAMPRAL) 333 MG EC tablet; Take 2 tablets (666 mg) by mouth 3 times daily for 60 days  Dispense: 180 tablet; Refill: 1  - Adult Mental Health  Referral; Future for MISBAH eval.  Call [phone] to schedule MISBAH eval  -encouraged 12 step recovery program such as [facility].org or [facility]org  -continue with gabapentin, titrating dosing to 600 mg TID   -3 week follow up virtual     2. Recurrent major depressive disorder, in partial remission (H24)  -no change  -continue with lexapro, and wellbutrin per pcp  -consider psychotherapy         PDMP Review       None  "             RTC  No follow-ups on file.      Counseled the patient on the importance of having a recovery program in addition to medication to manage recovery.  Components include avoiding isolating, having willingness to change, avoiding triggers and managing cravings. Encouraged having some type of sober network and practicing honesty with trusted support person(s). Encouraged other services such as counseling, 12 step or other self-help organizations.              SUBJECTIVE                                                    Lanie Martin is a 33 year old female who presents to clinic today for follow up    Visit performed In Person, face-to-face      MISBAH History:  Substance Use History:   \"Have you ever had any history with [...] use?\" And \"When was your last use?  ALCOHOL - daily  CANNABIS - daily  PRESCRIPTION STIMULANTS (includes Ritalin, Adderall, Vyvanse) - denies  COCAINE/CRACK - denies  METH/AMPHETAMINES (includes ecstacy, MDMA/alona) - denies  OPIATES - denies  BENZODIAZEPINES (includes Ativan, Klonopin, Xanax) - denies  KRATOM (mild opioid and stimulant effects) - denies  KETAMINE - denies  HALLUCINOGENS (includes DXM) - denies  BEHAVIORAL (Gambling, Eating d/o, Compulsivity) -   History of treatment -   NICOTINE  Cigarettes:   Chew/snus:   Vaping:   Past NRT/medication use:         Previous withdrawal treatment episodes (e.g. detox):   Previous MISBAH treatment programs: none  Hospitalizations or overdose: denies  Medical complications from substance use: denies  IV Drug use?: denies  Previous Medication for Addiction Tx: naltrexone  Longest period of full abstinence:   Activities that have previously supported abstinence:   Current Recovery Activities:         Infectious disease screening  Hep C:  No results found for: \"HCVAB\"     HIV:            HIV Antigen Antibody Combo   Date Value Ref Range Status   06/18/2020 Nonreactive NR^Nonreactive     Final       Comment:       HIV-1 p24 Ag & HIV-1/HIV-2 " Ab Not Detected               Pregnancy Status PPTL  (if no HcG in ED and patient is of childbearing years, please offer urine HcG)  LMP:   Sexually active:   Birth control/barriers:      Psychiatric History (per patient report and problem list review)  Past diagnoses - anxiety  Current or past psychiatrist: depression  Current or past therapist:    Hospitalizations/TMS/ECT -   Suicide Attempts -   Medication trials -        Recent HPI Details:  HPI:   Lanie Martin is a 33 year old female with history of anxiety and depression use who presents for further evaluation of possible substance use disorder and management options.     Started drinking since 15 years, normal party stuff at young age, weekend.  Has worsened in last year, currently drinking daily Stress from  and kids.   Two kids 3 and 5. Typically drinking fireball at work, and drinking at home 3-5 beers/day. When doesn't drink feels anxiety, irritability.      Kayla was diagnosed with ADHD and depression as a kid, took meds, stopped meds as a teenager.   Current meds for depression, Lexapro and wellbutrin.  Started 6-7 years ago,  Wellbutrin started in last 2-3  years.      Tried Naltrexone full dose on a few different occasions, within 30 minutes vomiting and feeling high, and fatigue. Does not want to retry  Two boys, Hayder, and Ace, lives with  (not supportive or particularly helpful with kids)        TODAY'S VISIT  HPI Feb 23, 2024  - Started acamprosate, tolerating, having some difficulty remembering, but going well, no change in cravings.  Drinking daily, and estimates no change in the amount she is drinking.   Recently got a cold, does not drink when she has a cold and can't breathe.   Still drinking daily, estimates the same amount. No change in psychosocial     OBJECTIVE  PHYSICAL EXAM:  LMP 01/04/2024 (Approximate)     GENERAL: healthy, alert and no distress  EYES: Eyes grossly normal to inspection, PERRL and conjunctivae and  sclerae normal  RESP: No respiratory distress  MENTAL STATUS EXAM  Appearance/Behavior: No appearant distress  Speech: Normal  Mood/Affect: normal affect  Insight: Adequate      PHQ-9 Score:       2/1/2024    11:02 AM 2/19/2024     7:43 AM 2/23/2024     1:46 PM   PHQ   PHQ-9 Total Score 5 8 6   Q9: Thoughts of better off dead/self-harm past 2 weeks Not at all Several days Several days   F/U: Thoughts of suicide or self-harm  No    F/U: Safety concerns  No        TRINITY-7 Score:      8/28/2022     1:11 PM 12/20/2023     3:25 PM 1/24/2024     8:15 AM   TRINITY-7 SCORE   Total Score 4 (minimal anxiety) 5 (mild anxiety)    Total Score 4 5 4       LABS (may not contain today's labs)                                                        Today's lab data  No results found for any visits on 02/23/24.        HISTORY                                                    Problem list reviewed & adjusted, as indicated.  Patient Active Problem List   Diagnosis    Other depression    Family history of hypertrophic cardiomyopathy    Recurrent major depressive disorder, in partial remission (H24)    Alcohol use disorder, severe, dependence (H)    Cannabis use disorder, severe, dependence (H)         MEDICATION LIST (prior to visit)  acamprosate (CAMPRAL) 333 MG EC tablet, Take 2 tablets (666 mg) by mouth 3 times daily for 60 days  buPROPion (WELLBUTRIN) 100 MG tablet, Take 1 tablet (100 mg) by mouth daily  escitalopram (LEXAPRO) 20 MG tablet, Take 1 tablet (20 mg) by mouth daily  gabapentin (NEURONTIN) 600 MG tablet, Take 0.5 tablets (300 mg) by mouth daily for 2 days, THEN 0.5 tablets (300 mg) 2 times daily for 2 days, THEN 0.5 tablets (300 mg) 3 times daily for 28 days.    No current facility-administered medications on file prior to visit.      MEDICATION LIST (after visit)  Current Outpatient Medications   Medication    acamprosate (CAMPRAL) 333 MG EC tablet    buPROPion (WELLBUTRIN) 100 MG tablet    escitalopram (LEXAPRO) 20 MG tablet     gabapentin (NEURONTIN) 600 MG tablet     No current facility-administered medications for this visit.         Allergies   Allergen Reactions    Tylenol W/Codeine [Acetaminophen-Codeine] Other (See Comments)     Causes lower back pain       Additional MDM Details:    Kaci Prather NP  [facility]  [phone]

## 2024-02-23 NOTE — NURSING NOTE
Is the patient currently in the state of [place]? YES    Visit mode:VIDEO    If the visit is dropped, the patient can be reconnected by: VIDEO VISIT: Text to cell phone:   Telephone Information:   Mobile [phone]       Will anyone else be joining the visit? No  (If patient encounters technical issues they should call [phone])    How would you like to obtain your AVS? MyChart    Are changes needed to the allergy or medication list? Pt stated no changes to allergies and Pt stated no med changes    Rooming Documentation: Attendance Guidelines - Care team has reviewed attendance agreement with patient. Patient advised that two failed appointments within 6 months may lead to transfer of current episode of care.       Reason for visit: OSWALDO Rodriguez, VVF

## 2024-03-08 ENCOUNTER — VIRTUAL VISIT (OUTPATIENT)
Dept: ADDICTION MEDICINE | Facility: CLINIC | Age: 34
End: 2024-03-08
Payer: COMMERCIAL

## 2024-03-08 DIAGNOSIS — F10.29 ALCOHOL DEPENDENCE WITH UNSPECIFIED ALCOHOL-INDUCED DISORDER (H): ICD-10-CM

## 2024-03-08 DIAGNOSIS — F12.20 CANNABIS USE DISORDER, SEVERE, DEPENDENCE (H): ICD-10-CM

## 2024-03-08 DIAGNOSIS — F10.20 ALCOHOL USE DISORDER, SEVERE, DEPENDENCE (H): Primary | ICD-10-CM

## 2024-03-08 DIAGNOSIS — F33.41 RECURRENT MAJOR DEPRESSIVE DISORDER, IN PARTIAL REMISSION (H): ICD-10-CM

## 2024-03-08 PROCEDURE — G2211 COMPLEX E/M VISIT ADD ON: HCPCS | Mod: 95

## 2024-03-08 PROCEDURE — 99214 OFFICE O/P EST MOD 30 MIN: CPT | Mod: 95

## 2024-03-08 RX ORDER — GABAPENTIN 600 MG/1
300-600 TABLET ORAL DAILY
Qty: 90 TABLET | Refills: 0 | Status: SHIPPED | OUTPATIENT
Start: 2024-03-08 | End: 2024-04-08

## 2024-03-08 RX ORDER — NALTREXONE HYDROCHLORIDE 50 MG/1
50 TABLET, FILM COATED ORAL DAILY
Qty: 30 TABLET | Refills: 1 | Status: SHIPPED | OUTPATIENT
Start: 2024-03-08 | End: 2024-04-08

## 2024-03-08 ASSESSMENT — PAIN SCALES - GENERAL: PAINLEVEL: NO PAIN (0)

## 2024-03-08 NOTE — PROGRESS NOTES
"Virtual Visit Details    Type of service:  Video Visit     Joined the call at 3/8/2024, 10:56:35 am.  Left the call at 3/8/2024, 11:25:15 am    Originating Location (pt. Location): Home    Distant Location (provider location):  On-site  Platform used for Video Visit: [facility]    A/P                                                    ASSESSMENT/PLAN  1. Alcohol use disorder, severe, dependence (H)  -needs improvement.    -has replaced some of her alcohol with NA  -Completed MISBAH assessment and has recommendations for treatment.  Is not able to attend residential treatment due to financial barriers and  issues.  Has information for IOP programming but also has a cost prohibiting barrier   -started naltrexone at half dose at night.  Not noticing side effects, and minimal results regarding alcohol cessation. -increase to full 50 mg dose when able, if noticing increased side effects can decrease dose to 25 mg  -encouraging building and establishing recovery support networks such as AA or smart recovery.org.    -encouraged more \"mindful\" use of alcohol, evaluate thoughts and feelings prior to starting to drink.  Continue to replace more drinks with NA  -continue with gabapentin  -Using shared decision making regarding the chronic nature of alcohol use, and needs for ongoing support and treatment.  The longitudinal plan of care for the diagnosis(es)/condition(s) as documented were addressed during this visit. Due to the added complexity in care, I will continue to support Kayla in the subsequent management and with ongoing continuity of care.  -continue with gabapentin 300-600 mg daily as needed to support alcohol cessation and anxiety  -follow up one month    2. Cannabis use disorder, severe, dependence (H)  --needs improvement    3. Recurrent major depressive disorder, in partial remission (H24)  -needs improvement  -continue with prescribed lexapro and " "buproprion  -considering psychotherapy        Problem list updated Mar 8, 2024   No problems updated.      Mar 8, 2024  - start naltrexone full dose        Last encounter A/P  ASSESSMENT/PLAN  1. Alcohol dependence with unspecified alcohol-induced disorder (H)  -needs improvement  - gabapentin (NEURONTIN) 600 MG tablet; Take 0.5-1 tablets (300-600 mg) by mouth daily  Dispense: 90 tablet; Refill: 0  - naltrexone (DEPADE/REVIA) 50 MG tablet; Take 1 tablet (50 mg) by mouth daily  Dispense: 30 tablet; Refill: 1  -encouraged to retry naltrexone at 1/2 dose, with food and in evenings, and evaluate side effects.   -complete MISBAH eval   - two week follow up to evaluate naltrexone start          2. Other depression  -no change  -continue wellbutrin      3. Cannabis use disorder, severe, dependence (H)  -no change in cannabis use  -encouraged cessation  -continue to ask, assess and advise         PDMP Review       None              RTC  No follow-ups on file.      Counseled the patient on the importance of having a recovery program in addition to medication to manage recovery.  Components include avoiding isolating, having willingness to change, avoiding triggers and managing cravings. Encouraged having some type of sober network and practicing honesty with trusted support person(s). Encouraged other services such as counseling, 12 step or other self-help organizations.              SUBJECTIVE                                                    Lanie Martin is a 33 year old female who presents to clinic today for follow up    Visit performed In Person, face-to-face    MISBAH History:  Substance Use History:   \"Have you ever had any history with [...] use?\" And \"When was your last use?  ALCOHOL - daily  CANNABIS - daily  PRESCRIPTION STIMULANTS (includes Ritalin, Adderall, Vyvanse) - denies  COCAINE/CRACK - denies  METH/AMPHETAMINES (includes ecstacy, MDMA/alona) - denies  OPIATES - denies  BENZODIAZEPINES (includes Ativan, " Klonopin, Xanax) - denies  KRATOM (mild opioid and stimulant effects) - denies  KETAMINE - denies  HALLUCINOGENS (includes DXM) - denies  BEHAVIORAL (Gambling, Eating d/o, Compulsivity) -   History of treatment -   NICOTINE  Cigarettes:   Chew/snus:   Vaping:   Past NRT/medication use:         Previous withdrawal treatment episodes (e.g. detox):   Previous MISBAH treatment programs: none  Hospitalizations or overdose: denies  Medical complications from substance use: denies  IV Drug use?: denies  Previous Medication for Addiction Tx: naltrexone  Longest period of full abstinence:   Activities that have previously supported abstinence:   Current Recovery Activities:        Recent HPI Details:  HPI Feb 23, 2024  - Started acamprosate, tolerating, having some difficulty remembering, but going well, no change in cravings.  Drinking daily, and estimates no change in the amount she is drinking.   Recently got a cold, does not drink when she has a cold and can't breathe.   Still drinking daily, estimates the same amount. No change in psychosocial     TODAY'S VISIT  HPI Mar 8, 2024  - A jaquelin  1/2 tablet naltrexone tablet at night.    Same alcohol use,   No change in cannabis.  Had one day that she used one beer  Trying NA beer.               OBJECTIVE  PHYSICAL EXAM:  LMP 01/04/2024 (Approximate)     GENERAL: healthy, alert and no distress  EYES: Eyes grossly normal to inspection, PERRL and conjunctivae and sclerae normal  RESP: No respiratory distress  MENTAL STATUS EXAM  Appearance/Behavior: No appearant distress  Speech: Normal  Mood/Affect: normal affect  Insight: Fair      PHQ-9 Score:       2/1/2024    11:02 AM 2/19/2024     7:43 AM 2/23/2024     1:46 PM   PHQ   PHQ-9 Total Score 5 8 6   Q9: Thoughts of better off dead/self-harm past 2 weeks Not at all Several days Several days   F/U: Thoughts of suicide or self-harm  No    F/U: Safety concerns  No        TRINITY-7 Score:      8/28/2022     1:11 PM 12/20/2023     3:25 PM  1/24/2024     8:15 AM   TRINITY-7 SCORE   Total Score 4 (minimal anxiety) 5 (mild anxiety)    Total Score 4 5 4       LABS (may not contain today's labs)                                                        Today's lab data  No results found for any visits on 03/08/24.        HISTORY                                                    Problem list reviewed & adjusted, as indicated.  Patient Active Problem List   Diagnosis    Other depression    Family history of hypertrophic cardiomyopathy    Recurrent major depressive disorder, in partial remission (H24)    Alcohol use disorder, severe, dependence (H)    Cannabis use disorder, severe, dependence (H)         MEDICATION LIST (prior to visit)  acamprosate (CAMPRAL) 333 MG EC tablet, Take 2 tablets (666 mg) by mouth 3 times daily for 60 days  buPROPion (WELLBUTRIN) 100 MG tablet, Take 1 tablet (100 mg) by mouth daily  escitalopram (LEXAPRO) 20 MG tablet, Take 1 tablet (20 mg) by mouth daily  gabapentin (NEURONTIN) 600 MG tablet, Take 0.5-1 tablets (300-600 mg) by mouth daily  naltrexone (DEPADE/REVIA) 50 MG tablet, Take 1 tablet (50 mg) by mouth daily    No current facility-administered medications on file prior to visit.      MEDICATION LIST (after visit)  Current Outpatient Medications   Medication    acamprosate (CAMPRAL) 333 MG EC tablet    buPROPion (WELLBUTRIN) 100 MG tablet    escitalopram (LEXAPRO) 20 MG tablet    gabapentin (NEURONTIN) 600 MG tablet    naltrexone (DEPADE/REVIA) 50 MG tablet     No current facility-administered medications for this visit.         Allergies   Allergen Reactions    Tylenol W/Codeine [Acetaminophen-Codeine] Other (See Comments)     Causes lower back pain         Kaci Prather NP  [facility]  [phone]

## 2024-03-08 NOTE — NURSING NOTE
Is the patient currently in the state of [place]? YES    Visit mode:VIDEO    If the visit is dropped, the patient can be reconnected by: VIDEO VISIT: Text to cell phone:   Telephone Information:   Mobile [phone]    and VIDEO VISIT: Send to e-mail at: [email or web address]    Will anyone else be joining the visit? NO  (If patient encounters technical issues they should call [phone]    How would you like to obtain your AVS? MyChart    Are changes needed to the allergy or medication list? No    Reason for visit: RECHECK    Evelyn SOLANO

## 2024-03-08 NOTE — PATIENT INSTRUCTIONS
Patient Education   Addiction Medicine  What to Expect  Here's what to expect from our Addiction Medicine program.  About Addiction Medicine  Addiction Medicine clinics help you with substance use problems. You set your own goals. We try to help you reach your goals. Your care plan can include:  Medicine  Creating a recovery plan  Helping you find local resources  Helping with treatment options  Clinic phone number and addresses  Clinic Phone: [phone]  Mental Health and Addiction Clinic  [facility]  [address], [address]  [place] [address]  [facility]  [address], [address]  [place] [address]  Walk-in services  We offer walk-in care for patients at the Recovery Clinic. This is only for patients with Opioid Use Disorder (OUD). Anyone with OUD is welcome. Our providers will refer you to the Recovery Clinic if you're struggling to keep up with your medicines or appointments.  Recovery Clinic ([facility])  [address], [address]  [place] [address]  Phone: [phone]  The Recovery Clinic is open for walk-ins Monday to Friday 9 a.m. to 11:30 a.m. and 12:30 p.m. to 3 p.m.  How it works  Come to your visits every time. The treatment works better when you do.   You can have as many visits as you need. When you're better, we'll refer you back to being cared for by your family doctor.   If you need it, we'll send you to doctors, psychiatrists, therapists, and other providers. We focus on treating addiction. We don't treat other problems, like managing other medicines or non-addiction issues.  About visits  Urine drug testing  We'll often test your pee (urine) for drugs. This is the only way we can know for sure whether or not you're using drugs. It helps us treat you without judgement.   Suboxone (buprenorphine)  If you're taking buprenorphine, you'll have a lot of visits at first. If your problem is getting worse, or you're  "using substances, we may schedule you for extra visits.   Cancelling visits  If you can't come to your visit, please call us right away at [phone]. If you don't cancel at least 24 hours (1 full day) before your visit, that's \"late cancellation.\"  Being late to visits  If you come late, you may not be seen. This will count as a \"no-show.\"  Please call the clinic if you're running late. This will help us plan, but it doesn't mean you'll be seen.   Being late is:  More than 15 minutes late for a return visit.  More than 30 minutes late for your first visit.  If you cancel late or don't show up 2 times within 6 months, we may transfer you to another clinic.   Getting help between visits  If you need help between visits, you can call us Monday to Friday from 8 a.m. to 4:30 p.m. at [phone]. You can also send us a message on [employer].  Medicine refills  If you miss or cancel a visit, you can still ask for a refill. But we can only refill your medicines if you've made a new appointment.  Please call your pharmacy for medicine refills. If you have a question about your refill, call us at [phone].  It takes up to 2 business days to refill your drugs. Let us know 2 to 3 days before you run out. Don't call more than 1 week before you run out. That's too early.   Please make sure we have your right phone number.  If we have a problem with your refill, we'll call you. If we call you, please call us back right away. If you don't, you may not get your medicines quickly.   Call your pharmacy to find out if your medicines are ready.   Keep your medicines in a safe place. Keep them away from pets and children. If your medicines are lost or stolen, we usually don't replace them. We recommend you file a police report if your medicines are stolen. Your insurance may not pay for early refills, even if you have a prescription.  Forms  Please give us at least 3 business days to fill out any forms. Bring the forms to your " visits if you can. We may refer you to other members of your care team to complete the forms.   Emergency care   Call 911 or go to the nearest emergency room if your life or someone else's life is in danger.  Call 988 anytime for the Suicide and Crisis Lifeline.  If you need care when we're closed, call your family doctor to see if they can help. You can also go to urgent care or an emergency room. [facility] emergency rooms may be able to give you buprenorphine or other medicine refills.  Thank you for choosing us for your care.  For informational purposes only. Not to replace the advice of your health care provider. Copyright   2023 [facility]. All rights reserved. [employer] [id number] - REV 05/23.

## 2024-04-08 ENCOUNTER — VIRTUAL VISIT (OUTPATIENT)
Dept: ADDICTION MEDICINE | Facility: CLINIC | Age: 34
End: 2024-04-08
Payer: COMMERCIAL

## 2024-04-08 DIAGNOSIS — F12.20 CANNABIS USE DISORDER, SEVERE, DEPENDENCE (H): ICD-10-CM

## 2024-04-08 DIAGNOSIS — F33.41 RECURRENT MAJOR DEPRESSIVE DISORDER, IN PARTIAL REMISSION (H): ICD-10-CM

## 2024-04-08 DIAGNOSIS — F10.20 ALCOHOL USE DISORDER, SEVERE, DEPENDENCE (H): Primary | ICD-10-CM

## 2024-04-08 PROCEDURE — 99214 OFFICE O/P EST MOD 30 MIN: CPT | Mod: 95

## 2024-04-08 PROCEDURE — G2211 COMPLEX E/M VISIT ADD ON: HCPCS | Mod: 95

## 2024-04-08 RX ORDER — NALTREXONE HYDROCHLORIDE 50 MG/1
50 TABLET, FILM COATED ORAL DAILY
Qty: 90 TABLET | Refills: 0 | Status: SHIPPED | OUTPATIENT
Start: 2024-04-08 | End: 2024-05-10

## 2024-04-08 RX ORDER — GABAPENTIN 600 MG/1
300-600 TABLET ORAL 3 TIMES DAILY
Qty: 90 TABLET | Refills: 0 | Status: SHIPPED | OUTPATIENT
Start: 2024-04-08 | End: 2024-05-10

## 2024-04-08 ASSESSMENT — PATIENT HEALTH QUESTIONNAIRE - PHQ9
10. IF YOU CHECKED OFF ANY PROBLEMS, HOW DIFFICULT HAVE THESE PROBLEMS MADE IT FOR YOU TO DO YOUR WORK, TAKE CARE OF THINGS AT HOME, OR GET ALONG WITH OTHER PEOPLE: SOMEWHAT DIFFICULT
SUM OF ALL RESPONSES TO PHQ QUESTIONS 1-9: 7
SUM OF ALL RESPONSES TO PHQ QUESTIONS 1-9: 7

## 2024-04-08 ASSESSMENT — PAIN SCALES - GENERAL: PAINLEVEL: NO PAIN (0)

## 2024-04-08 NOTE — PROGRESS NOTES
Virtual Visit Details    Type of service:  Video Visit   Joined the call at 4/8/2024, 10:59:56 am.  Left the call at 4/8/2024, 11:19:04 am.    Originating Location (pt. Location): Home    Distant Location (provider location):  Off-site  Platform used for Video Visit: [facility]    A/P                                                    ASSESSMENT/PLAN  1. Alcohol use disorder, severe, dependence (H)  -showing improvement.  Last use March 13.  -tolerating full dose naltrexone.  If misses a dose, will have side effects following day.  Occasional cravings, manageable   -continue naltrexone, and gabapentin  - naltrexone (DEPADE/REVIA) 50 MG tablet; Take 1 tablet (50 mg) by mouth daily for 90 days  Dispense: 90 tablet; Refill: 0  - gabapentin (NEURONTIN) 600 MG tablet; Take 0.5-1 tablets (300-600 mg) by mouth 3 times daily  Dispense: 90 tablet; Refill: 0  -continue with IOP programming and follow recommendations  -continue with weekly psychotherapy  -continue with building and establishing sobriety network  -1 month follow up     Continued Complex Management  The longitudinal plan of care for Alcohol Use Disorder (AUD) was addressed during this visit. Due to the added complexity in care, I will continue to support Kayla in the subsequent management and with ongoing continuity of care.    2. Cannabis use disorder, severe, dependence (H)  -needs improvement  -reduced use to afternoons only    3. Recurrent major depressive disorder, in partial remission (H24)  -no change in medications  -continue with [facility]       Apr 8, 2024  - IOP started March 13  -continue naltrexone and gabapentin            Last encounter A/P  1. Alcohol use disorder, severe, dependence (H)  -needs improvement.    -has replaced some of her alcohol with NA  -Completed MISBAH assessment and has recommendations for treatment.  Is not able to attend residential  "treatment due to financial barriers and  issues.  Has information for IOP programming but also has a cost prohibiting barrier   -started naltrexone at half dose at night.  Not noticing side effects, and minimal results regarding alcohol cessation. -increase to full 50 mg dose when able, if noticing increased side effects can decrease dose to 25 mg  -encouraging building and establishing recovery support networks such as AA or smart [email or web address].    -encouraged more \"mindful\" use of alcohol, evaluate thoughts and feelings prior to starting to drink.  Continue to replace more drinks with NA  -continue with gabapentin  -Using shared decision making regarding the chronic nature of alcohol use, and needs for ongoing support and treatment.  The longitudinal plan of care for the diagnosis(es)/condition(s) as documented were addressed during this visit. Due to the added complexity in care, I will continue to support Kayla in the subsequent management and with ongoing continuity of care.  -continue with gabapentin 300-600 mg daily as needed to support alcohol cessation and anxiety  -follow up one month     2. Cannabis use disorder, severe, dependence (H)  --needs improvement     3. Recurrent major depressive disorder, in partial remission (H24)  -needs improvement  -continue with prescribed lexapro and buproprion  -considering psychotherapy         PDMP Review         Value Time User    State PDMP site checked  Yes 3/8/2024 12:58 PM Kaci Prather NP              RTC  No follow-ups on file.      Counseled the patient on the importance of having a recovery program in addition to medication to manage recovery.  Components include avoiding isolating, having willingness to change, avoiding triggers and managing cravings. Encouraged having some type of sober network and practicing honesty with trusted support person(s). Encouraged other services such as counseling, 12 step or other self-help organizations.  " "            SUBJECTIVE                                                    Lanie Martin is a 33 year old female who presents to clinic today for follow up    Visit performed virtual video       MISBAH History:  MISBAH History:  Substance Use History:   \"Have you ever had any history with [...] use?\" And \"When was your last use?  ALCOHOL - daily  CANNABIS - daily  PRESCRIPTION STIMULANTS (includes Ritalin, Adderall, Vyvanse) - denies  COCAINE/CRACK - denies  METH/AMPHETAMINES (includes ecstacy, MDMA/alona) - denies  OPIATES - denies  BENZODIAZEPINES (includes Ativan, Klonopin, Xanax) - denies  KRATOM (mild opioid and stimulant effects) - denies  KETAMINE - denies  HALLUCINOGENS (includes DXM) - denies  BEHAVIORAL (Gambling, Eating d/o, Compulsivity) -   History of treatment -   NICOTINE  Cigarettes:   Chew/snus:   Vaping:   Past NRT/medication use:         Previous withdrawal treatment episodes (e.g. detox):   Previous MISBAH treatment programs: none  Hospitalizations or overdose: denies  Medical complications from substance use: denies  IV Drug use?: denies  Previous Medication for Addiction Tx: naltrexone  Longest period of full abstinence:   Activities that have previously supported abstinence:   Current Recovery Activities:     Recent HPI Details:  HPI Mar 8, 2024  1/2 tablet naltrexone tablet at night.    Same alcohol use,   No change in cannabis.  Had one day that she used one beer  Trying NA beer.    TODAY'S VISIT  HPI Apr 8, 2024  - Working on painting model trucks.   Started naltrexone full dose in the am.  \"Been ok\" Sober since March 13. \"Hyperactivity is through the roof\"   Cravings here and there but overall manageable.     Used to do \"fireball Friday's\" friends tried to be sneaky and hide the fireball.  Friends overall supportive of Kayla's goals  Enjoy's bloody jd's.  Judges bloody jd festival.   Work has been slow.     [facility] to [facility], outpatient treatment started March 13.  Going 3 X " week, and 1X individual counseling. M-W-Th 8-12, and Tuesday individual.     Trying to set up sober support. Making connections through group 1-2 .       Answers submitted by the patient for this visit:  Patient Health Questionnaire (Submitted on 4/8/2024)  If you checked off any problems, how difficult have these problems made it for you to do your work, take care of things at home, or get along with other people?: Somewhat difficult  PHQ9 TOTAL SCORE: 7        OBJECTIVE  PHYSICAL EXAM:  There were no vitals taken for this visit.    GENERAL: healthy, alert and no distress  EYES: Eyes grossly normal to inspection, PERRL and conjunctivae and sclerae normal  RESP: No respiratory distress  MENTAL STATUS EXAM  Appearance/Behavior: No appearant distress  Speech: Normal  Mood/Affect: normal affect  Insight: Adequate      PHQ-9 Score:       2/19/2024     7:43 AM 2/23/2024     1:46 PM 4/8/2024     7:43 AM   PHQ   PHQ-9 Total Score 8 6 7   Q9: Thoughts of better off dead/self-harm past 2 weeks Several days Several days Not at all   F/U: Thoughts of suicide or self-harm No     F/U: Safety concerns No         TRINITY-7 Score:      8/28/2022     1:11 PM 12/20/2023     3:25 PM 1/24/2024     8:15 AM   TRINITY-7 SCORE   Total Score 4 (minimal anxiety) 5 (mild anxiety)    Total Score 4 5 4       LABS (may not contain today's labs)                                                        Today's lab data  No results found for any visits on 04/08/24.        HISTORY                                                    Problem list reviewed & adjusted, as indicated.  Patient Active Problem List   Diagnosis    Other depression    Family history of hypertrophic cardiomyopathy    Recurrent major depressive disorder, in partial remission (H24)    Alcohol use disorder, severe, dependence (H)    Cannabis use disorder, severe, dependence (H)         MEDICATION LIST (prior to visit)  Current Outpatient Medications   Medication Sig Dispense Refill     buPROPion (WELLBUTRIN) 100 MG tablet Take 1 tablet (100 mg) by mouth daily 90 tablet 3    escitalopram (LEXAPRO) 20 MG tablet Take 1 tablet (20 mg) by mouth daily 90 tablet 3    gabapentin (NEURONTIN) 600 MG tablet Take 0.5-1 tablets (300-600 mg) by mouth daily 90 tablet 0    naltrexone (DEPADE/REVIA) 50 MG tablet Take 1 tablet (50 mg) by mouth daily 30 tablet 1     No current facility-administered medications for this visit.       MEDICATION LIST (after visit)  Current Outpatient Medications   Medication Sig Dispense Refill    buPROPion (WELLBUTRIN) 100 MG tablet Take 1 tablet (100 mg) by mouth daily 90 tablet 3    escitalopram (LEXAPRO) 20 MG tablet Take 1 tablet (20 mg) by mouth daily 90 tablet 3    gabapentin (NEURONTIN) 600 MG tablet Take 0.5-1 tablets (300-600 mg) by mouth daily 90 tablet 0    naltrexone (DEPADE/REVIA) 50 MG tablet Take 1 tablet (50 mg) by mouth daily 30 tablet 1     No current facility-administered medications for this visit.         Allergies   Allergen Reactions    Tylenol W/Codeine [Acetaminophen-Codeine] Other (See Comments)     Causes lower back pain       Additional MDM Details:      Kaci Prather NP  [facility]  [phone]

## 2024-04-08 NOTE — NURSING NOTE
Is the patient currently in the state of [place]? YES    Visit mode:VIDEO    If the visit is dropped, the patient can be reconnected by: VIDEO VISIT: Text to cell phone:   Telephone Information:   Mobile [phone]       Will anyone else be joining the visit? NO  (If patient encounters technical issues they should call [phone]    How would you like to obtain your AVS? MyChart    Are changes needed to the allergy or medication list? No    Reason for visit: OSWALDO SOLANO

## 2024-05-10 ENCOUNTER — VIRTUAL VISIT (OUTPATIENT)
Dept: ADDICTION MEDICINE | Facility: CLINIC | Age: 34
End: 2024-05-10
Payer: COMMERCIAL

## 2024-05-10 VITALS — BODY MASS INDEX: 25.76 KG/M2 | HEIGHT: 62 IN | WEIGHT: 140 LBS

## 2024-05-10 DIAGNOSIS — F10.20 ALCOHOL USE DISORDER, SEVERE, DEPENDENCE (H): Primary | ICD-10-CM

## 2024-05-10 DIAGNOSIS — F12.20 CANNABIS USE DISORDER, SEVERE, DEPENDENCE (H): ICD-10-CM

## 2024-05-10 PROCEDURE — 99214 OFFICE O/P EST MOD 30 MIN: CPT | Mod: 95

## 2024-05-10 PROCEDURE — G2211 COMPLEX E/M VISIT ADD ON: HCPCS | Mod: 95

## 2024-05-10 RX ORDER — GABAPENTIN 600 MG/1
300-600 TABLET ORAL 3 TIMES DAILY
Qty: 90 TABLET | Refills: 1 | Status: SHIPPED | OUTPATIENT
Start: 2024-05-10 | End: 2024-06-13

## 2024-05-10 RX ORDER — NALTREXONE HYDROCHLORIDE 50 MG/1
50 TABLET, FILM COATED ORAL DAILY
Qty: 90 TABLET | Refills: 1 | Status: SHIPPED | OUTPATIENT
Start: 2024-05-10 | End: 2024-07-10

## 2024-05-10 ASSESSMENT — PATIENT HEALTH QUESTIONNAIRE - PHQ9
10. IF YOU CHECKED OFF ANY PROBLEMS, HOW DIFFICULT HAVE THESE PROBLEMS MADE IT FOR YOU TO DO YOUR WORK, TAKE CARE OF THINGS AT HOME, OR GET ALONG WITH OTHER PEOPLE: SOMEWHAT DIFFICULT
SUM OF ALL RESPONSES TO PHQ QUESTIONS 1-9: 4
SUM OF ALL RESPONSES TO PHQ QUESTIONS 1-9: 4

## 2024-05-10 ASSESSMENT — PAIN SCALES - GENERAL: PAINLEVEL: NO PAIN (0)

## 2024-05-10 NOTE — NURSING NOTE
Is the patient currently in the state of [place]? YES    Visit mode:VIDEO    If the visit is dropped, the patient can be reconnected by: VIDEO VISIT: Text to cell phone:   Telephone Information:   Mobile [phone]       Will anyone else be joining the visit? NO  (If patient encounters technical issues they should call [phone])    How would you like to obtain your AVS? MyChart    Are changes needed to the allergy or medication list? No    Are refills needed on medications prescribed by this physician? NO  Patient will talk to provider about medications    Reason for visit: OSWALDO FONGF

## 2024-05-10 NOTE — PROGRESS NOTES
Virtual Visit Details    Type of service:  Video Visit   Joined the call at 5/10/2024, 8:50:13 am.  Left the call at 5/10/2024, 9:14:54 am    Originating Location (pt. Location): Other work    Distant Location (provider location):  Off-site  Platform used for Video Visit: [facility]    A/P                                                    ASSESSMENT/PLAN  1. Alcohol use disorder, severe, dependence (H)  -showing improvement.  60 days without use  - gabapentin (NEURONTIN) 600 MG tablet; Take 0.5-1 tablets (300-600 mg) by mouth 3 times daily  Dispense: 90 tablet; Refill: 1  - naltrexone (DEPADE/REVIA) 50 MG tablet; Take 1 tablet (50 mg) by mouth daily  Dispense: 90 tablet; Refill: 1  -continue with IOP programming and following recommendations  -continue with 1:1 counseling   -1 month follow up    2. Cannabis use disorder, severe, dependence (H)  -needs improvement, Ongoing use in afternoons  -strongly encouraged cessation.  -continue with IOP programming and follow all recommendations     Continued Complex Management  The longitudinal plan of care for Alcohol Use Disorder (AUD) was addressed during this visit. Due to the added complexity in care, I will continue to support Kayla in the subsequent management and with ongoing continuity of care.    May 10, 2024  - naltrexone  Discontinue acamprosate  IOP programming, beginning phase two             Last encounter A/P from 4/8/24 visit  1. Alcohol use disorder, severe, dependence (H)  -showing improvement.  Last use March 13.  -tolerating full dose naltrexone.  If misses a dose, will have side effects following day.  Occasional cravings, manageable   -continue naltrexone, and gabapentin  - naltrexone (DEPADE/REVIA) 50 MG tablet; Take 1 tablet (50 mg) by mouth daily for 90 days  Dispense: 90 tablet; Refill: 0  - gabapentin (NEURONTIN) 600 MG tablet; Take 0.5-1 tablets (300-600 mg) by mouth 3 times daily  Dispense: 90 tablet;  "Refill: 0  -continue with IOP programming and follow recommendations  -continue with weekly psychotherapy  -continue with building and establishing sobriety network  -1 month follow up      Continued Complex Management  The longitudinal plan of care for Alcohol Use Disorder (AUD) was addressed during this visit. Due to the added complexity in care, I will continue to support Kayla in the subsequent management and with ongoing continuity of care.     2. Cannabis use disorder, severe, dependence (H)  -needs improvement  -reduced use to afternoons only     3. Recurrent major depressive disorder, in partial remission (H24)  -no change in medications  -continue with [facility]otherapy [facility]         Apr 8, 2024  - IOP started March 13  -continue naltrexone and gabapentin         PDMP Review         Value Time User    State PDMP site checked  Yes 3/8/2024 12:58 PM Kaci Prather, [facility]              RTC  No follow-ups on file.      Counseled the patient on the importance of having a recovery program in addition to medication to manage recovery.  Components include avoiding isolating, having willingness to change, avoiding triggers and managing cravings. Encouraged having some type of sober network and practicing honesty with trusted support person(s). Encouraged other services such as counseling, 12 step or other self-help organizations.              SUBJECTIVE                                                    Lanie Martin is a 33 year old female who presents to clinic today for follow up    Visit performed virtual video       MISBAH History:  Substance Use History:   \"Have you ever had any history with [...] use?\" And \"When was your last use?  ALCOHOL - daily  CANNABIS - daily  PRESCRIPTION STIMULANTS (includes Ritalin, Adderall, Vyvanse) - denies  COCAINE/CRACK - denies  METH/AMPHETAMINES (includes ecstacy, MDMA/alona) - denies  OPIATES - denies  BENZODIAZEPINES (includes Ativan, " "Klonopin, Xanax) - denies  KRATOM (mild opioid and stimulant effects) - denies  KETAMINE - denies  HALLUCINOGENS (includes DXM) - denies  BEHAVIORAL (Gambling, Eating d/o, Compulsivity) -   History of treatment -   NICOTINE  Cigarettes:   Chew/snus:   Vaping:   Past NRT/medication use:         Previous withdrawal treatment episodes (e.g. detox):   Previous MISBAH treatment programs: none  Hospitalizations or overdose: denies  Medical complications from substance use: denies  IV Drug use?: denies  Previous Medication for Addiction Tx: naltrexone  Longest period of full abstinence:   Activities that have previously supported abstinence:   Current Recovery Activities:        Recent HPI Details:  HPI Apr 8, 2024  - Working on painting model trucks.   Started naltrexone full dose in the am.  \"Been ok\" Sober since March 13. \"Hyperactivity is through the roof\"   Cravings here and there but overall manageable.     Used to do \"fireball Friday's\" friends tried to be sneaky and hide the fireball.  Friends overall supportive of Kayla's goals  Enjoy's bloodCloud Amenitys.  Judges bloodOwlTing ??? festival.   Work has been slow.      Going to [facility], outpatient treatment started March 13.  Going 3 X week, and 1X individual counseling. M-W-Th 8-12, and Tuesday individual.      Trying to set up sober support. Making connections through group 1-2 .     TODAY'S VISIT  HPI May 10, 2024  - IOP going well.  Almost to next step to decrease 1 day a week and 1 individual.   No alcohol use, almost to 60 days. Occasional urges with stress.  Reached out to peer group and was \"talked off the ledge\".  Has three group members she talks to regularly    Mood, better since car stress resolving  Sleeping-normal   No change in cannabis.  \"Maybe reduced\"  Will talk to PCP regarding ADHD.  Psych referral as needed.       OBJECTIVE  PHYSICAL EXAM:  Ht 1.575 m (5' 2\")   Wt 63.5 kg (140 lb)   BMI 25.61 kg/m      GENERAL: healthy, alert and no " distress  EYES: Eyes grossly normal to inspection, PERRL and conjunctivae and sclerae normal  RESP: No respiratory distress  MENTAL STATUS EXAM  Appearance/Behavior: No appearant distress  Speech: Normal  Mood/Affect: normal affect  Insight: Adequate      PHQ-9 Score:       2/23/2024     1:46 PM 4/8/2024     7:43 AM 5/10/2024     8:35 AM   PHQ   PHQ-9 Total Score 6 7 4   Q9: Thoughts of better off dead/self-harm past 2 weeks Several days Not at all Not at all       TRINITY-7 Score:      8/28/2022     1:11 PM 12/20/2023     3:25 PM 1/24/2024     8:15 AM   TRINITY-7 SCORE   Total Score 4 (minimal anxiety) 5 (mild anxiety)    Total Score 4 5 4       LABS (may not contain today's labs)                                                        Today's lab data  No results found for any visits on 05/10/24.        HISTORY                                                    Problem list reviewed & adjusted, as indicated.  Patient Active Problem List   Diagnosis    Other depression    Family history of hypertrophic cardiomyopathy    Recurrent major depressive disorder, in partial remission (H24)    Alcohol use disorder, severe, dependence (H)    Cannabis use disorder, severe, dependence (H)         MEDICATION LIST (prior to visit)  Current Outpatient Medications   Medication Sig Dispense Refill    buPROPion (WELLBUTRIN) 100 MG tablet Take 1 tablet (100 mg) by mouth daily 90 tablet 3    escitalopram (LEXAPRO) 20 MG tablet Take 1 tablet (20 mg) by mouth daily 90 tablet 3    gabapentin (NEURONTIN) 600 MG tablet Take 0.5-1 tablets (300-600 mg) by mouth 3 times daily 90 tablet 0    naltrexone (DEPADE/REVIA) 50 MG tablet Take 1 tablet (50 mg) by mouth daily for 90 days 90 tablet 0     No current facility-administered medications for this visit.       MEDICATION LIST (after visit)  Current Outpatient Medications   Medication Sig Dispense Refill    buPROPion (WELLBUTRIN) 100 MG tablet Take 1 tablet (100 mg) by mouth daily 90 tablet 3     escitalopram (LEXAPRO) 20 MG tablet Take 1 tablet (20 mg) by mouth daily 90 tablet 3    gabapentin (NEURONTIN) 600 MG tablet Take 0.5-1 tablets (300-600 mg) by mouth 3 times daily 90 tablet 0    naltrexone (DEPADE/REVIA) 50 MG tablet Take 1 tablet (50 mg) by mouth daily for 90 days 90 tablet 0     No current facility-administered medications for this visit.         Allergies   Allergen Reactions    Tylenol W/Codeine [Acetaminophen-Codeine] Other (See Comments)     Causes lower back pain         Kaci Prather NP  [facility]  [phone]

## 2024-06-13 ENCOUNTER — VIRTUAL VISIT (OUTPATIENT)
Dept: ADDICTION MEDICINE | Facility: CLINIC | Age: 34
End: 2024-06-13
Payer: COMMERCIAL

## 2024-06-13 VITALS — WEIGHT: 140 LBS | BODY MASS INDEX: 25.76 KG/M2 | HEIGHT: 62 IN

## 2024-06-13 DIAGNOSIS — F33.41 RECURRENT MAJOR DEPRESSIVE DISORDER, IN PARTIAL REMISSION (H): ICD-10-CM

## 2024-06-13 DIAGNOSIS — F90.9 ATTENTION DEFICIT HYPERACTIVITY DISORDER (ADHD), UNSPECIFIED ADHD TYPE: ICD-10-CM

## 2024-06-13 DIAGNOSIS — F10.21 ALCOHOL USE DISORDER, SEVERE, IN EARLY REMISSION, DEPENDENCE (H): Primary | ICD-10-CM

## 2024-06-13 PROBLEM — F10.20 ALCOHOL USE DISORDER, SEVERE, DEPENDENCE (H): Status: RESOLVED | Noted: 2024-02-20 | Resolved: 2024-06-13

## 2024-06-13 PROCEDURE — G2211 COMPLEX E/M VISIT ADD ON: HCPCS | Mod: 95

## 2024-06-13 PROCEDURE — 99214 OFFICE O/P EST MOD 30 MIN: CPT | Mod: 95

## 2024-06-13 RX ORDER — BUPROPION HYDROCHLORIDE 300 MG/1
300 TABLET ORAL EVERY MORNING
Qty: 30 TABLET | Refills: 1 | Status: SHIPPED | OUTPATIENT
Start: 2024-07-12 | End: 2024-08-07

## 2024-06-13 RX ORDER — BUPROPION HYDROCHLORIDE 150 MG/1
TABLET ORAL
Qty: 53 TABLET | Refills: 0 | Status: SHIPPED | OUTPATIENT
Start: 2024-06-13 | End: 2024-08-07 | Stop reason: DRUGHIGH

## 2024-06-13 RX ORDER — BUPROPION HYDROCHLORIDE 150 MG/1
TABLET ORAL
Qty: 53 TABLET | Refills: 0 | Status: SHIPPED | OUTPATIENT
Start: 2024-07-12 | End: 2024-06-13

## 2024-06-13 ASSESSMENT — PATIENT HEALTH QUESTIONNAIRE - PHQ9
SUM OF ALL RESPONSES TO PHQ QUESTIONS 1-9: 5
SUM OF ALL RESPONSES TO PHQ QUESTIONS 1-9: 5
10. IF YOU CHECKED OFF ANY PROBLEMS, HOW DIFFICULT HAVE THESE PROBLEMS MADE IT FOR YOU TO DO YOUR WORK, TAKE CARE OF THINGS AT HOME, OR GET ALONG WITH OTHER PEOPLE: SOMEWHAT DIFFICULT

## 2024-06-13 ASSESSMENT — PAIN SCALES - GENERAL: PAINLEVEL: NO PAIN (0)

## 2024-06-13 NOTE — NURSING NOTE
Is the patient currently in the state of [place]? YES    Visit mode:VIDEO    If the visit is dropped, the patient can be reconnected by: VIDEO VISIT: Text to cell phone:   Telephone Information:   Mobile [phone]       Will anyone else be joining the visit? NO  (If patient encounters technical issues they should call [phone]    How would you like to obtain your AVS? MyChart    Are changes needed to the allergy or medication list? No    Are refills needed on medications prescribed by this physician? NO    Reason for visit: RECHECK    Della SOLANO

## 2024-06-13 NOTE — PROGRESS NOTES
Virtual Visit Details    Type of service:  Video Visit     Joined the call at 6/13/2024, 9:00:39 am.  Left the call at 6/13/2024, 9:26:31 am.    Originating Location (pt. Location): Other work    Distant Location (provider location):  Off-site  Platform used for Video Visit: [employer] [facility]    A/P                                                    ASSESSMENT/PLAN  1. Alcohol use disorder, severe, in early remission, dependence (H)  -improving.  90 days without use  -continue naltrexone 50 mg day  -continue IOP programming and weekly counseling and follow all recommendations  -establish 12 step meeting home group  -1 month followup     Continued Complex Management  The longitudinal plan of care for Alcohol Use Disorder (AUD) was addressed during this visit. Due to the added complexity in care, I will continue to support Kayla in the subsequent management and with ongoing continuity of care.    2. Recurrent major depressive disorder, in partial remission (H24)  -controlled.  Increase in dose for ADHD symptom management  - buPROPion (WELLBUTRIN XL) 300 MG 24 hr tablet; Take 1 tablet (300 mg) by mouth every morning  Dispense: 30 tablet; Refill: 1  - buPROPion (WELLBUTRIN XL) 150 MG 24 hr tablet; Take 1 tablet (150 mg) by mouth every morning for 7 days, THEN 2 tablets (300 mg) every morning for 23 days.  Dispense: 53 tablet; Refill: 0  -monitor for signs of increased anxiety and/or suicidal thoughts    3. Attention deficit hyperactivity disorder (ADHD), unspecified ADHD type  -needs improvement  - buPROPion (WELLBUTRIN XL) 300 MG 24 hr tablet; Take 1 tablet (300 mg) by mouth every morning  Dispense: 30 tablet; Refill: 1  - buPROPion (WELLBUTRIN XL) 150 MG 24 hr tablet; Take 1 tablet (150 mg) by mouth every morning for 7 days, THEN 2 tablets (300 mg) every morning for 23 days.  Dispense: 53 tablet; Refill: 0  1 month follow up      Jun 13, 2024  - continue naltrexone. IOP establish  "12 step home group  -increase bupropion for ADHD symptoms         Last encounter A/P  1. Alcohol use disorder, severe, dependence (H)  -showing improvement.  60 days without use  - gabapentin (NEURONTIN) 600 MG tablet; Take 0.5-1 tablets (300-600 mg) by mouth 3 times daily  Dispense: 90 tablet; Refill: 1  - naltrexone (DEPADE/REVIA) 50 MG tablet; Take 1 tablet (50 mg) by mouth daily  Dispense: 90 tablet; Refill: 1  -continue with IOP programming and following recommendations  -continue with 1:1 counseling   -1 month follow up     2. Cannabis use disorder, severe, dependence (H)  -needs improvement, Ongoing use in afternoons  -strongly encouraged cessation.  -continue with IOP programming and follow all recommendations      Continued Complex Management  The longitudinal plan of care for Alcohol Use Disorder (AUD) was addressed during this visit. Due to the added complexity in care, I will continue to support Kayla in the subsequent management and with ongoing continuity of care.     May 10, 2024  - naltrexone  Discontinue acamprosate  IOP programming, beginning phase two       PDMP Review         Value Time User    State PDMP site checked  Yes 3/8/2024 12:58 PM Kaci Prather, S[facility]              RTC  Return in about 1 month (around 7/13/2024).      Counseled the patient on the importance of having a recovery program in addition to medication to manage recovery.  Components include avoiding isolating, having willingness to change, avoiding triggers and managing cravings. Encouraged having some type of sober network and practicing honesty with trusted support person(s). Encouraged other services such as counseling, 12 step or other self-help organizations.              SUBJECTIVE                                                    Lanie Martin is a 34 year old female who presents to clinic today for follow up    Visit performed virtual video         MISBAH History:  Substance Use History:   \"Have you ever had " "any history with [...] use?\" And \"When was your last use?  ALCOHOL - daily  CANNABIS - daily  PRESCRIPTION STIMULANTS (includes Ritalin, Adderall, Vyvanse) - denies  COCAINE/CRACK - denies  METH/AMPHETAMINES (includes ecstacy, MDMA/alona) - denies  OPIATES - denies  BENZODIAZEPINES (includes Ativan, Klonopin, Xanax) - denies  KRATOM (mild opioid and stimulant effects) - denies  KETAMINE - denies  HALLUCINOGENS (includes DXM) - denies  BEHAVIORAL (Gambling, Eating d/o, Compulsivity) -   History of treatment -   NICOTINE  Cigarettes:   Chew/snus:   Vaping:   Past NRT/medication use:         Previous withdrawal treatment episodes (e.g. detox):   Previous MISBAH treatment programs: none  Hospitalizations or overdose: denies  Medical complications from substance use: denies  IV Drug use?: denies  Previous Medication for Addiction Tx: naltrexone  Longest period of full abstinence:   Activities that have previously supported abstinence:   Current Recovery Activities:     Recent HPI Details:  HPI May 10, 2024  - IOP going well.  Almost to next step to decrease 1 day a week and 1 individual.   No alcohol use, almost to 60 days. Occasional urges with stress.  Reached out to peer group and was \"talked off the ledge\".  Has three group members she talks to regularly     Mood, better since car stress resolving  Sleeping-normal   No change in cannabis.  \"Maybe reduced\"  Will talk to PCP regarding ADHD.  Psych referral as needed.     TODAY'S VISIT  HPI Jun 13, 2024  - Boys kicked out of  for behaviors.  Needs to find another  in two weeks.    Still in IOP programming, now next step down.  1 day week/ 1 individual.  90 days abstinent on Tuesday.   Small vacation in [facility], went to [employer] and camping with  and kids.    Has not seen PCP for ADHD symptoms \"It's on my to do list\"   Discontinued gabapentin, was forgetting doses and didn't notice a change in cravings.  Originally started due to naltrexone " intolerance.    No use, positive about IOP programming and recovery. Has episodes of cravings during extreme stress, but able to control    OBJECTIVE  PHYSICAL EXAM:  There were no vitals taken for this visit.    GENERAL: healthy, alert and no distress  EYES: Eyes grossly normal to inspection, PERRL and conjunctivae and sclerae normal  RESP: No respiratory distress  MENTAL STATUS EXAM  Appearance/Behavior: No appearant distress  Speech: Normal  Mood/Affect: normal affect  Insight: Adequate      PHQ-9 Score:       2/23/2024     1:46 PM 4/8/2024     7:43 AM 5/10/2024     8:35 AM   PHQ   PHQ-9 Total Score 6 7 4   Q9: Thoughts of better off dead/self-harm past 2 weeks Several days Not at all Not at all       TRINITY-7 Score:      8/28/2022     1:11 PM 12/20/2023     3:25 PM 1/24/2024     8:15 AM   TRINITY-7 SCORE   Total Score 4 (minimal anxiety) 5 (mild anxiety)    Total Score 4 5 4       LABS (may not contain today's labs)                                                        Today's lab data  No results found for any visits on 06/13/24.        HISTORY                                                    Problem list reviewed & adjusted, as indicated.  Patient Active Problem List   Diagnosis    Other depression    Family history of hypertrophic cardiomyopathy    Recurrent major depressive disorder, in partial remission (H24)    Alcohol use disorder, severe, dependence (H)    Cannabis use disorder, severe, dependence (H)         MEDICATION LIST (prior to visit)  Current Outpatient Medications   Medication Sig Dispense Refill    buPROPion (WELLBUTRIN) 100 MG tablet Take 1 tablet (100 mg) by mouth daily 90 tablet 3    escitalopram (LEXAPRO) 20 MG tablet Take 1 tablet (20 mg) by mouth daily 90 tablet 3    gabapentin (NEURONTIN) 600 MG tablet Take 0.5-1 tablets (300-600 mg) by mouth 3 times daily 90 tablet 1    naltrexone (DEPADE/REVIA) 50 MG tablet Take 1 tablet (50 mg) by mouth daily 90 tablet 1     No current  facility-administered medications for this visit.       MEDICATION LIST (after visit)  Current Outpatient Medications   Medication Sig Dispense Refill    buPROPion (WELLBUTRIN) 100 MG tablet Take 1 tablet (100 mg) by mouth daily 90 tablet 3    escitalopram (LEXAPRO) 20 MG tablet Take 1 tablet (20 mg) by mouth daily 90 tablet 3    gabapentin (NEURONTIN) 600 MG tablet Take 0.5-1 tablets (300-600 mg) by mouth 3 times daily 90 tablet 1    naltrexone (DEPADE/REVIA) 50 MG tablet Take 1 tablet (50 mg) by mouth daily 90 tablet 1     No current facility-administered medications for this visit.         Allergies   Allergen Reactions    Tylenol W/Codeine [Acetaminophen-Codeine] Other (See Comments)     Causes lower back pain         Kaci Prather NP  [facility]  [phone]

## 2024-07-10 ENCOUNTER — VIRTUAL VISIT (OUTPATIENT)
Dept: ADDICTION MEDICINE | Facility: CLINIC | Age: 34
End: 2024-07-10
Payer: COMMERCIAL

## 2024-07-10 DIAGNOSIS — F90.9 ATTENTION DEFICIT HYPERACTIVITY DISORDER (ADHD), UNSPECIFIED ADHD TYPE: ICD-10-CM

## 2024-07-10 DIAGNOSIS — F10.20 ALCOHOL USE DISORDER, SEVERE, DEPENDENCE (H): Primary | ICD-10-CM

## 2024-07-10 DIAGNOSIS — F33.41 RECURRENT MAJOR DEPRESSIVE DISORDER, IN PARTIAL REMISSION (H): ICD-10-CM

## 2024-07-10 PROCEDURE — G2211 COMPLEX E/M VISIT ADD ON: HCPCS | Mod: 95

## 2024-07-10 PROCEDURE — 99214 OFFICE O/P EST MOD 30 MIN: CPT | Mod: 95

## 2024-07-10 RX ORDER — NALTREXONE HYDROCHLORIDE 50 MG/1
50 TABLET, FILM COATED ORAL DAILY
Qty: 90 TABLET | Refills: 1 | Status: SHIPPED | OUTPATIENT
Start: 2024-07-10 | End: 2024-08-07

## 2024-07-10 NOTE — PROGRESS NOTES
[facility]    A/P                                                    ASSESSMENT/PLAN  There are no diagnoses linked to this encounter.  No orders of the defined types were placed in this encounter.    1. Alcohol use disorder, severe, dependence (H)  -controlled in early remission   -with increased stress, has increase in cravings.  Has not returned to use  - naltrexone (DEPADE/REVIA) 50 MG tablet; Take 1 tablet (50 mg) by mouth daily  Dispense: 90 tablet; Refill: 1  -continue with IOP programming and follow all recommendations.   -1 month follow up       Continued Complex Management  The longitudinal plan of care for Alcohol Use Disorder (AUD) was addressed during this visit. Due to the added complexity in care, I will continue to support Kayla in the subsequent management and with ongoing continuity of care.    2. Recurrent major depressive disorder, in partial remission (H24)  3. Attention deficit hyperactivity disorder (ADHD), unspecified ADHD type  -needs improvement   -taking lexapro, started buproprion, with increased stress difficult to know if finding effective.    -continue with lexapro, and buproprion  -referral for therapist.  Kayla to Call [phone] to schedule   - Adult Mental Health  Referral; Future        Jul 10, 2024  - IOP  Therapist referral         Last encounter A/P  1. Alcohol use disorder, severe, in early remission, dependence (H)  -improving.  90 days without use  -continue naltrexone 50 mg day  -continue IOP programming and weekly counseling and follow all recommendations  -establish 12 step meeting home group  -1 month followup      Continued Complex Management  The longitudinal plan of care for Alcohol Use Disorder (AUD) was addressed during this visit. Due to the added complexity in care, I will continue to support Kayla in the subsequent management and with ongoing continuity of care.     2. Recurrent major depressive disorder, in  "partial remission (H24)  -controlled.  Increase in dose for ADHD symptom management  - buPROPion (WELLBUTRIN XL) 300 MG 24 hr tablet; Take 1 tablet (300 mg) by mouth every morning  Dispense: 30 tablet; Refill: 1  - buPROPion (WELLBUTRIN XL) 150 MG 24 hr tablet; Take 1 tablet (150 mg) by mouth every morning for 7 days, THEN 2 tablets (300 mg) every morning for 23 days.  Dispense: 53 tablet; Refill: 0  -monitor for signs of increased anxiety and/or suicidal thoughts     3. Attention deficit hyperactivity disorder (ADHD), unspecified ADHD type  -needs improvement  - buPROPion (WELLBUTRIN XL) 300 MG 24 hr tablet; Take 1 tablet (300 mg) by mouth every morning  Dispense: 30 tablet; Refill: 1  - buPROPion (WELLBUTRIN XL) 150 MG 24 hr tablet; Take 1 tablet (150 mg) by mouth every morning for 7 days, THEN 2 tablets (300 mg) every morning for 23 days.  Dispense: 53 tablet; Refill: 0  1 month follow up        Jun 13, 2024  - continue naltrexone. IOP establish 12 step home group  -increase bupropion for ADHD symptoms       PDMP Review         Value Time User    State PDMP site checked  Yes 6/13/2024 10:05 AM Kaci Prather NP          Call [phone] to schedule       RTC  No follow-ups on file.      Counseled the patient on the importance of having a recovery program in addition to medication to manage recovery.  Components include avoiding isolating, having willingness to change, avoiding triggers and managing cravings. Encouraged having some type of sober network and practicing honesty with trusted support person(s). Encouraged other services such as counseling, 12 step or other self-help organizations.              SUBJECTIVE                                                    Lanie Martin is a 34 year old female who presents to clinic today for follow up    Visit performed virtual video       MISBAH History:  Substance Use History:   \"Have you ever had any history with [...] use?\" And \"When was your last " "use?  ALCOHOL -   CANNABIS - daily  PRESCRIPTION STIMULANTS (includes Ritalin, Adderall, Vyvanse) - denies  COCAINE/CRACK - denies  METH/AMPHETAMINES (includes ecstacy, MDMA/alona) - denies  OPIATES - denies  BENZODIAZEPINES (includes Ativan, Klonopin, Xanax) - denies  KRATOM (mild opioid and stimulant effects) - denies  KETAMINE - denies  HALLUCINOGENS (includes DXM) - denies  BEHAVIORAL (Gambling, Eating d/o, Compulsivity) -   History of treatment -   NICOTINE  Cigarettes:   Chew/snus:   Vaping:   Past NRT/medication use:         Previous withdrawal treatment episodes (e.g. detox):   Previous MISBAH treatment programs: none  Hospitalizations or overdose: denies  Medical complications from substance use: denies  IV Drug use?: denies  Previous Medication for Addiction Tx: naltrexone  Longest period of full abstinence:   Activities that have previously supported abstinence:   Current Recovery Activities:     Recent HPI Details:  HPI 2024  - Boys kicked out of  for behaviors.  Needs to find another  in two weeks.    Still in IOP programming, now next step down.  1 day week/ 1 individual.  90 days abstinent on Tuesday.   Small vacation in [facility], went to [place] and camping with  and kids.    Has not seen PCP for ADHD symptoms \"It's on my to do list\"   Discontinued gabapentin, was forgetting doses and didn't notice a change in cravings.  Originally started due to naltrexone intolerance.    No use, positive about IOP programming and recovery. Has episodes of cravings during extreme stress, but able to control    TODAY'S VISIT  HPI Jul 10, 2024  - IOP finishing this   Increased  stress in the last month, was able to find temporary . Friend from treatment  from medical complications \"The family pulled the plug too soon\"   not being supportive.  Processing through with group.  IOP counselor suggesting therapist.     Job stress, considered quitting several times in " last month  With increased stress, has considered drinking, but has not returned to use.        OBJECTIVE  PHYSICAL EXAM:  There were no vitals taken for this visit.    GENERAL: healthy, alert and no distress  EYES: Eyes grossly normal to inspection, PERRL and conjunctivae and sclerae normal  RESP: No respiratory distress  MENTAL STATUS EXAM  Appearance/Behavior: No appearant distress  Speech: Normal  Mood/Affect: normal affect  Insight: Adequate      PHQ-9 Score:       4/8/2024     7:43 AM 5/10/2024     8:35 AM 6/13/2024     8:46 AM   PHQ   PHQ-9 Total Score 7 4 5   Q9: Thoughts of better off dead/self-harm past 2 weeks Not at all Not at all Not at all       TRINITY-7 Score:      8/28/2022     1:11 PM 12/20/2023     3:25 PM 1/24/2024     8:15 AM   TRINITY-7 SCORE   Total Score 4 (minimal anxiety) 5 (mild anxiety)    Total Score 4 5 4       LABS (may not contain today's labs)                                                        Today's lab data  No results found for any visits on 07/10/24.        HISTORY                                                    Problem list reviewed & adjusted, as indicated.  Patient Active Problem List   Diagnosis    Other depression    Family history of hypertrophic cardiomyopathy    Recurrent major depressive disorder, in partial remission (H24)    Cannabis use disorder, severe, dependence (H)    Alcohol use disorder, severe, in early remission, dependence (H)         MEDICATION LIST (prior to visit)  Current Outpatient Medications   Medication Sig Dispense Refill    buPROPion (WELLBUTRIN XL) 150 MG 24 hr tablet Take 1 tablet (150 mg) by mouth every morning for 7 days, THEN 2 tablets (300 mg) every morning for 23 days. 53 tablet 0    [START ON 7/12/2024] buPROPion (WELLBUTRIN XL) 300 MG 24 hr tablet Take 1 tablet (300 mg) by mouth every morning 30 tablet 1    escitalopram (LEXAPRO) 20 MG tablet Take 1 tablet (20 mg) by mouth daily 90 tablet 3    naltrexone (DEPADE/REVIA) 50 MG tablet Take 1  tablet (50 mg) by mouth daily 90 tablet 1     No current facility-administered medications for this visit.       MEDICATION LIST (after visit)  Current Outpatient Medications   Medication Sig Dispense Refill    buPROPion (WELLBUTRIN XL) 150 MG 24 hr tablet Take 1 tablet (150 mg) by mouth every morning for 7 days, THEN 2 tablets (300 mg) every morning for 23 days. 53 tablet 0    [START ON 7/12/2024] buPROPion (WELLBUTRIN XL) 300 MG 24 hr tablet Take 1 tablet (300 mg) by mouth every morning 30 tablet 1    escitalopram (LEXAPRO) 20 MG tablet Take 1 tablet (20 mg) by mouth daily 90 tablet 3    naltrexone (DEPADE/REVIA) 50 MG tablet Take 1 tablet (50 mg) by mouth daily 90 tablet 1     No current facility-administered medications for this visit.         Allergies   Allergen Reactions    Tylenol W/Codeine [Acetaminophen-Codeine] Other (See Comments)     Causes lower back pain           Kaci Prather NP  [facility]  [phone]

## 2024-07-10 NOTE — PROGRESS NOTES
Virtual Visit Details    Type of service:  Video Visit     Joined the call at 7/10/2024, 10:44:48 am.  Left the call at 7/10/2024, 10:59:08 am.    Originating Location (pt. Location): Other work    Distant Location (provider location):  On-site  Platform used for Video Visit: [employer]

## 2024-07-10 NOTE — NURSING NOTE
Is the patient currently in the state of [place]? YES    Current patient location:  Pt currently at work    Visit mode:VIDEO    If the visit is dropped, the patient can be reconnected by: VIDEO VISIT: Text to cell phone:   Telephone Information:   Mobile [phone]       Will anyone else be joining the visit? No  (If patient encounters technical issues they should call [phone])    How would you like to obtain your AVS? MyChart    Are changes needed to the allergy or medication list? No    Are refills needed on medications prescribed by this physician? NO    Rooming Documentation: Assigned questionnaire(s) completed .    Reason for visit: RUSS Smith

## 2024-07-22 ASSESSMENT — ANXIETY QUESTIONNAIRES
7. FEELING AFRAID AS IF SOMETHING AWFUL MIGHT HAPPEN: NOT AT ALL
5. BEING SO RESTLESS THAT IT IS HARD TO SIT STILL: SEVERAL DAYS
4. TROUBLE RELAXING: MORE THAN HALF THE DAYS
8. IF YOU CHECKED OFF ANY PROBLEMS, HOW DIFFICULT HAVE THESE MADE IT FOR YOU TO DO YOUR WORK, TAKE CARE OF THINGS AT HOME, OR GET ALONG WITH OTHER PEOPLE?: SOMEWHAT DIFFICULT
6. BECOMING EASILY ANNOYED OR IRRITABLE: MORE THAN HALF THE DAYS
IF YOU CHECKED OFF ANY PROBLEMS ON THIS QUESTIONNAIRE, HOW DIFFICULT HAVE THESE PROBLEMS MADE IT FOR YOU TO DO YOUR WORK, TAKE CARE OF THINGS AT HOME, OR GET ALONG WITH OTHER PEOPLE: SOMEWHAT DIFFICULT
GAD7 TOTAL SCORE: 8
2. NOT BEING ABLE TO STOP OR CONTROL WORRYING: SEVERAL DAYS
GAD7 TOTAL SCORE: 8
7. FEELING AFRAID AS IF SOMETHING AWFUL MIGHT HAPPEN: NOT AT ALL
1. FEELING NERVOUS, ANXIOUS, OR ON EDGE: SEVERAL DAYS
3. WORRYING TOO MUCH ABOUT DIFFERENT THINGS: SEVERAL DAYS

## 2024-07-25 ENCOUNTER — VIRTUAL VISIT (OUTPATIENT)
Dept: BEHAVIORAL HEALTH | Facility: CLINIC | Age: 34
End: 2024-07-25
Payer: COMMERCIAL

## 2024-07-25 DIAGNOSIS — F33.41 RECURRENT MAJOR DEPRESSIVE DISORDER, IN PARTIAL REMISSION (H): ICD-10-CM

## 2024-07-25 DIAGNOSIS — F41.1 GENERALIZED ANXIETY DISORDER: ICD-10-CM

## 2024-07-25 PROCEDURE — 90791 PSYCH DIAGNOSTIC EVALUATION: CPT | Mod: 95

## 2024-07-25 ASSESSMENT — COLUMBIA-SUICIDE SEVERITY RATING SCALE - C-SSRS
1. HAVE YOU WISHED YOU WERE DEAD OR WISHED YOU COULD GO TO SLEEP AND NOT WAKE UP?: NO
2. HAVE YOU ACTUALLY HAD ANY THOUGHTS OF KILLING YOURSELF?: NO
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
## 2024-08-01 ENCOUNTER — VIRTUAL VISIT (OUTPATIENT)
Dept: BEHAVIORAL HEALTH | Facility: CLINIC | Age: 34
End: 2024-08-01
Payer: COMMERCIAL

## 2024-08-01 DIAGNOSIS — F41.1 GENERALIZED ANXIETY DISORDER: ICD-10-CM

## 2024-08-01 DIAGNOSIS — F33.41 RECURRENT MAJOR DEPRESSIVE DISORDER, IN PARTIAL REMISSION (H): ICD-10-CM

## 2024-08-01 PROCEDURE — 90837 PSYTX W PT 60 MINUTES: CPT | Mod: 95

## 2024-08-01 NOTE — PROGRESS NOTES
[facility]                                     Progress Note    Patient Name: Lanie Martin  Date: 24           Service Type: Individual      Session Start Time: 2.59  Session End Time: 3.54     Session Length: 53+ minutes    Session #: 1    Attendees: Client attended alone    Service Modality:  Video Visit:      Provider verified identity through the following two step process.  Patient provided:  Patient  and Patient address    Telemedicine Visit: The patient's condition can be safely assessed and treated via synchronous audio and visual telemedicine encounter.      Reason for Telemedicine Visit: Patient has requested telehealth visit    Originating Site (Patient Location): Patient's place of employment    Distant Site (Provider Location): [facility]    Consent:  The patient/guardian has verbally consented to: the potential risks and benefits of telemedicine (video visit) versus in person care; bill my insurance or make self-payment for services provided; and responsibility for payment of non-covered services.     Patient would like the video invitation sent by:  My Chart    Mode of Communication:  Video Conference via [employer]well    Distant Location (Provider):  On-site    As the provider I attest to compliance with applicable laws and regulations related to telemedicine.    DATA  Interactive Complexity: No  Crisis: No  Extended Session (53+ minutes):   - Patient's presenting concerns require more intensive intervention than could be completed within the usual service  Interactive Complexity: No  Crisis: No         Progress Since Last Session (Related to Symptoms / Goals / Homework):   Symptoms: No change . Symptoms remain the same.    Homework: Partially completed      Episode of Care Goals: Satisfactory progress - CONTEMPLATION (Considering change and yet undecided); Intervened by assessing the negative and positive thinking  (ambivalence) about behavior change     Current / Ongoing Stressors and Concerns:   Patient discussed raising birds. Patient discussed relationship dynamics with her 2 young children. Patient discussed finishing up chemical dependency treatment at [facility] this week and has been sober from alcohol use since February 2024. Patient discussed her cannabis use. Patient discussed noticing some anger and narcissistic traits in her . Patient discussed her conflict-avoidant communication style. Patient discussed workplace stressors. Patient discussed her hobbies including raising chickens and rajiv art. Patient discussed eating an average of 1.5-2 meals/ day. Patient discussed a lack of appetite.        Treatment Objective(s) Addressed in This Session:   Increase interest, engagement, and pleasure in doing things  Decrease frequency and intensity of feeling anxious, nervous, worried       Intervention:   Motivational Interviewing    MI Intervention: Expressed Empathy/Understanding, Supported Autonomy, Collaboration, Evocation, Open-ended questions, and Reflections: simple and complex     Change Talk Expressed by the Patient: Desire to change    Provider Response to Change Talk: E - Evoked more info from patient about behavior change, A - Affirmed patient's thoughts, decisions, or attempts at behavior change, and R - Reflected patient's change talk    Assessments completed prior to visit:  The following assessments were completed by patient for this visit:  PHQ9:       2/1/2024    11:02 AM 2/19/2024     7:43 AM 2/23/2024     1:46 PM 4/8/2024     7:43 AM 5/10/2024     8:35 AM 6/13/2024     8:46 AM 7/25/2024    11:29 AM   PHQ-9 SCORE   PHQ-9 Total Score MyChart 5 (Mild depression) 8 (Mild depression)  7 (Mild depression) 4 (Minimal depression) 5 (Mild depression) 4 (Minimal depression)   PHQ-9 Total Score 5 8 6 7 4 5 4     GAD7:       3/19/2019     2:54 PM 3/5/2021    11:14 AM 5/10/2022     7:54 AM  8/28/2022     1:11 PM 12/20/2023     3:25 PM 1/24/2024     8:15 AM 7/22/2024    10:49 AM   TRINITY-7 SCORE   Total Score    4 (minimal anxiety) 5 (mild anxiety)  8 (mild anxiety)   Total Score 0 2 3 4 5 4 8     PROMIS 10-Global Health (all questions and answers displayed):       2/1/2024    11:03 AM 2/14/2024     1:01 PM 2/23/2024     1:46 PM 6/10/2024    11:56 AM 7/18/2024     8:01 AM   PROMIS 10   In general, would you say your health is:     Good   In general, would you say your quality of life is:     Fair   In general, how would you rate your physical health?     Good   In general, how would you rate your mental health, including your mood and your ability to think?     Poor   In general, how would you rate your satisfaction with your social activities and relationships?     Good   In general, please rate how well you carry out your usual social activities and roles     Fair   To what extent are you able to carry out your everyday physical activities such as walking, climbing stairs, carrying groceries, or moving a chair?     Completely   In the past 7 days, how often have you been bothered by emotional problems such as feeling anxious, depressed, or irritable?     Sometimes   In the past 7 days, how would you rate your fatigue on average?     Mild   In the past 7 days, how would you rate your pain on average, where 0 means no pain, and 10 means worst imaginable pain?     0   In general, would you say your health is:     3   In general, would you say your quality of life is:     2   In general, how would you rate your physical health?     3   In general, how would you rate your mental health, including your mood and your ability to think?     1   In general, how would you rate your satisfaction with your social activities and relationships?     3   In general, please rate how well you carry out your usual social activities and roles. (This includes activities at home, at work and in your community, and  "responsibilities as a parent, child, spouse, employee, friend, etc.)     2   To what extent are you able to carry out your everyday physical activities such as walking, climbing stairs, carrying groceries, or moving a chair?     5   In the past 7 days, how often have you been bothered by emotional problems such as feeling anxious, depressed, or irritable?     3   In the past 7 days, how would you rate your fatigue on average?     2   In the past 7 days, how would you rate your pain on average, where 0 means no pain, and 10 means worst imaginable pain?     0   Global Mental Health Score     9   Global Physical Health Score     17   PROMIS TOTAL - SUBSCORES     26       Information is confidential and restricted. Go to Review Flowsheets to unlock data.     [place] Suicide Severity Rating Scale (Lifetime/Recent)      2/3/2019     3:50 AM 1/20/2021     7:53 PM 2/20/2024    10:00 AM 7/25/2024    11:54 AM   [place] Suicide Severity Rating (Lifetime/Recent)   Q1 Wished to be Dead (Past Month) no no     Q2 Suicidal Thoughts (Past Month) no no     Q3 Suicidal Thought Method  no     Q4 Suicidal Intent without Specific Plan  no     Q5 Suicide Intent with Specific Plan  no     Q6 Suicide Behavior (Lifetime) no no     Q1 Wish to be Dead (Lifetime)   Y N   Wish to be Dead Description (Lifetime)   The patient denied having any history of suicide attempts.    1. Wish to be Dead (Past 1 Month)   Y    Wish to be Dead Description (Past 1 Month)   The patient reported she had ONLY had passive suicide ideation, with no plan or intent to harm herself.  The thoughts had been along the lines of \"thongs would be easier if she were not around\".    Q2 Non-Specific Active Suicidal Thoughts (Lifetime)   Y N   Non-Specific Active Suicidal Thought Description (Lifetime)   See above    2. Non-Specific Active Suicidal Thoughts (Past 1 Month)   Y    Non-Specific Active Suicidal Thought Description (Past 1 Month)   See above    3. Active Suicidal " Ideation with any Methods (Not Plan) Without Intent to Act (Lifetime)   N    Active Suicidal Ideation with any Methods (Not Plan) Description (Lifetime)   NA    Q3 Active Suicidal Ideation with any Methods (Not Plan) Without Intent to Act (Past 1 Month)   N    Q4 Active Suicidal Ideation with Some Intent to Act, Without Specific Plan (Lifetime)   N    Active Suicidal Ideation with Some Intent to Act, Without Specific Plan Description (Lifetime)   NA    4. Active Suicidal Ideation with Some Intent to Act, Without Specific Plan (Past 1 Month)   N    Q5 Active Suicidal Ideation with Specific Plan and Intent (Lifetime)   N    Active Suicidal Ideation with Specific Plan and Intent Description (Lifetime)   NA    5. Active Suicidal Ideation with Specific Plan and Intent (Past 1 Month)   N    Most Severe Ideation Rating (Lifetime)   3    Description of Most Severe Ideation (Lifetime)   See abobe    Most Severe Ideation Rating (Past 1 Month)   2    Description of Most Severe Ideation (Past 1 Month)   See above    Frequency (Lifetime)   1    Frequency (Past 1 Month)   1    Duration (Lifetime)   3    Duration (Past 1 Month)   3    Controllability (Lifetime)   3    Controllability (Past 1 Month)   3    Deterrents (Lifetime)   1    Deterrents (Past 1 Month)   1    Reasons for Ideation (Lifetime)   5    Reasons for Ideation (Past 1 Month)   5    Actual Attempt (Lifetime)   N    Has subject engaged in non-suicidal self-injurious behavior? (Lifetime)   N    Interrupted Attempts (Lifetime)   N    Aborted or Self-Interrupted Attempt (Lifetime)   N    Preparatory Acts or Behavior (Lifetime)   N    Calculated C-SSRS Risk Score (Lifetime/Recent)   Low Risk          ASSESSMENT: Current Emotional / Mental Status (status of significant symptoms):   Risk status (Self / Other harm or suicidal ideation)   Patient denies current fears or concerns for personal safety.   Patient denies current or recent suicidal ideation or behaviors.   Patient  denies current or recent homicidal ideation or behaviors.   Patient denies current or recent self injurious behavior or ideation.   Patient denies other safety concerns.   Patient reports there has been no change in risk factors since their last session.     Patient reports there has been no change in protective factors since their last session.     Recommended that patient call 911 or go to the local ED should there be a change in any of these risk factors.     Appearance:   Appropriate    Eye Contact:   Good    Psychomotor Behavior: Normal    Attitude:   Cooperative  Friendly   Orientation:   All   Speech    Rate / Production: Normal/ Responsive Talkative    Volume:  Normal    Mood:    Anxious    Affect:    Appropriate    Thought Content:  Clear    Thought Form:  Coherent  Logical    Insight:    Fair      Medication Review:   No changes to current psychiatric medication(s)     Medication Compliance:   Yes     Changes in Health Issues:   None reported     Chemical Use Review:   Substance Use: Chemical use reviewed, no active concerns identified .  However, patient's daily cannabis use will continue to be evaluated for Cannabis Use Disorder. Patient reported successfully completing chemical dependency treatment at [facility] and has reportedly been sober from alcohol use since February 2024.      Tobacco Use: No current tobacco use.      Diagnosis:  1. Recurrent major depressive disorder, in partial remission (H24)    2. Generalized anxiety disorder    Other Diagnoses that is relevant to services: Alcohol Use Disorder, currently in partial remission   Provisional Diagnoses in the process of being ruled-out: ADHD & PTSD      Collateral Reports Completed:   Not Applicable    PLAN: (Patient Tasks / Therapist Tasks / Other)  Patient will return in 1 week for next session. Patient will engage in self-care activities. Patient will work on mindfulness skills to increase awareness of triggers to anxiety and  depression. Patient will work on open and direct communication skills with her .         Melanie Lei, [facility]                                                         ______________________________________________________________________    Individual Treatment Plan    Patient's Name: Lanie Martin  YOB: 1990    Date of Creation: 8/1/24  Date Treatment Plan Last Reviewed/Revised: 8/1/24    DSM5 Diagnoses:   Encounter Diagnoses   Name Primary?    Recurrent major depressive disorder, in partial remission (H24)     Generalized anxiety disorder        Psychosocial / Contextual Factors: Substance use, relationship stressors, occupational dynamics.  PROMIS (reviewed every 90 days):   PROMIS-10 as of 7/25/24    Global Mental Health Score (P) 9   Global Physical Health Score (P) 17   PROMIS TOTAL - SUBSCORES (P) 26       Referral / Collaboration:  Referral to another professional/service is not indicated at this time..    Anticipated number of session for this episode of care: 9-12 sessions  Anticipation frequency of session: Biweekly  Anticipated Duration of each session: 38-52 minutes  Treatment plan will be reviewed in 90 days or when goals have been changed.       MeasurableTreatment Goal(s) related to diagnosis / functional impairment(s)  Goal 1: Patient will reduce depression symptoms as evidenced by a reduction in their PHQ9 score to a 2 or less in the next 2 months.     I will know I've met my goal when I'm smiling more.      Objective #A (Patient Action)    Patient will Decrease frequency and intensity of feeling down, depressed, hopeless.  Status: New - Date: 8/1/24      Intervention(s)  Therapist will teach  distress tolerance and coping skills .    Objective #B  Patient will Increase interest, engagement, and pleasure in doing things.  Status: New - Date: 8/1/24     Intervention(s)  Therapist will  teach self-care skills .    Objective #C  Patient will Improve diet, appetite, mindful  eating, and / or meal planning.  Status: New - Date: 8/1/24     Intervention(s)  Therapist will  provide psycho-education on the impact and effects of nutrition on mental health.  .        Goal 2: Patient will reduce anxiety symptoms as evidenced by a reduction in their GAD7 score to a 4 or less in the next 2 months.     I will know I've met my goal when I don't panic when someone walks in for their shift & I'm not stressing out when my  sighs.      Objective #A (Patient Action)    Status: New - Date: 8/1/24     Patient will identify at least 2 triggers for anxiety.    Intervention(s)  Therapist will  teach mindfulness skills .    Objective #B  Patient will use thought-stopping strategy daily to reduce intrusive thoughts.    Status: New - Date: 8/1/24     Intervention(s)  Therapist will  teach thought-stopping and thought-challenging techniques .            Patient has reviewed and agreed to the above plan.      Melanie Lei, [facility]  August 1, 2024

## 2024-08-07 ENCOUNTER — VIRTUAL VISIT (OUTPATIENT)
Dept: ADDICTION MEDICINE | Facility: CLINIC | Age: 34
End: 2024-08-07
Payer: COMMERCIAL

## 2024-08-07 DIAGNOSIS — F90.9 ATTENTION DEFICIT HYPERACTIVITY DISORDER (ADHD), UNSPECIFIED ADHD TYPE: ICD-10-CM

## 2024-08-07 DIAGNOSIS — F10.20 ALCOHOL USE DISORDER, SEVERE, DEPENDENCE (H): Primary | ICD-10-CM

## 2024-08-07 DIAGNOSIS — F33.41 RECURRENT MAJOR DEPRESSIVE DISORDER, IN PARTIAL REMISSION (H): ICD-10-CM

## 2024-08-07 DIAGNOSIS — F12.20 CANNABIS USE DISORDER, SEVERE, DEPENDENCE (H): ICD-10-CM

## 2024-08-07 PROCEDURE — G2211 COMPLEX E/M VISIT ADD ON: HCPCS | Mod: 95

## 2024-08-07 PROCEDURE — 99214 OFFICE O/P EST MOD 30 MIN: CPT | Mod: 95

## 2024-08-07 RX ORDER — BUPROPION HYDROCHLORIDE 300 MG/1
300 TABLET ORAL EVERY MORNING
Qty: 90 TABLET | Refills: 0 | Status: SHIPPED | OUTPATIENT
Start: 2024-08-07

## 2024-08-07 RX ORDER — NALTREXONE HYDROCHLORIDE 50 MG/1
50 TABLET, FILM COATED ORAL DAILY
Qty: 90 TABLET | Refills: 1 | Status: SHIPPED | OUTPATIENT
Start: 2024-08-07

## 2024-08-07 ASSESSMENT — PAIN SCALES - GENERAL: PAINLEVEL: NO PAIN (0)

## 2024-08-07 ASSESSMENT — PATIENT HEALTH QUESTIONNAIRE - PHQ9
SUM OF ALL RESPONSES TO PHQ QUESTIONS 1-9: 2
10. IF YOU CHECKED OFF ANY PROBLEMS, HOW DIFFICULT HAVE THESE PROBLEMS MADE IT FOR YOU TO DO YOUR WORK, TAKE CARE OF THINGS AT HOME, OR GET ALONG WITH OTHER PEOPLE: NOT DIFFICULT AT ALL
SUM OF ALL RESPONSES TO PHQ QUESTIONS 1-9: 2
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
## 2024-08-07 NOTE — NURSING NOTE
Current patient location:  work    Is the patient currently in the state [facility]? YES    Visit mode:VIDEO    If the visit is dropped, the patient can be reconnected by: VIDEO VISIT: Text to cell phone:   Telephone Information:   Mobile [phone]    and VIDEO VISIT: Send to e-mail at: [email or web address]    Will anyone else be joining the visit? NO  (If patient encounters technical issues they should call [phone])    How would you like to obtain your AVS? MyChart    Are changes needed to the allergy or medication list? Pt stated no changes to allergies and Pt stated no med changes    Are refills needed on medications prescribed by this physician? NO    Rooming Documentation:  Assigned questionnaire(s) completed      Reason for visit: RECHECK    Varsha SOLANO

## 2024-08-08 ENCOUNTER — VIRTUAL VISIT (OUTPATIENT)
Dept: BEHAVIORAL HEALTH | Facility: CLINIC | Age: 34
End: 2024-08-08
Payer: COMMERCIAL

## 2024-08-08 DIAGNOSIS — F33.41 RECURRENT MAJOR DEPRESSIVE DISORDER, IN PARTIAL REMISSION (H): ICD-10-CM

## 2024-08-08 DIAGNOSIS — F41.1 GENERALIZED ANXIETY DISORDER: Primary | ICD-10-CM

## 2024-08-08 PROCEDURE — 90837 PSYTX W PT 60 MINUTES: CPT | Mod: 95
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
## 2024-08-22 ENCOUNTER — VIRTUAL VISIT (OUTPATIENT)
Dept: BEHAVIORAL HEALTH | Facility: CLINIC | Age: 34
End: 2024-08-22
Payer: COMMERCIAL

## 2024-08-22 DIAGNOSIS — F41.1 GENERALIZED ANXIETY DISORDER: Primary | ICD-10-CM

## 2024-08-22 DIAGNOSIS — F33.41 RECURRENT MAJOR DEPRESSIVE DISORDER, IN PARTIAL REMISSION (H): ICD-10-CM

## 2024-08-22 PROCEDURE — 90834 PSYTX W PT 45 MINUTES: CPT | Mod: 95

## 2024-08-22 NOTE — PROGRESS NOTES
[facility]                                     Progress Note    Patient Name: Lanie Martin  Date: 24           Service Type: Individual      Session Start Time: 2.51  Session End Time: 3.30     Session Length: 38-52 minutes    Session #: 3    Attendees: Client attended alone    Service Modality:  Video Visit:      Provider verified identity through the following two step process.  Patient provided:  Patient  and Patient address    Telemedicine Visit: The patient's condition can be safely assessed and treated via synchronous audio and visual telemedicine encounter.      Reason for Telemedicine Visit: Patient has requested telehealth visit    Originating Site (Patient Location): Patient's place of employment    Distant Site (Provider Location): [facility]    Consent:  The patient/guardian has verbally consented to: the potential risks and benefits of telemedicine (video visit) versus in person care; bill my insurance or make self-payment for services provided; and responsibility for payment of non-covered services.     Patient would like the video invitation sent by:  My Chart    Mode of Communication:  Video Conference via [employer]    Distant Location (Provider):  On-site    As the provider I attest to compliance with applicable laws and regulations related to telemedicine.    DATA  Interactive Complexity: No  Crisis: No        Progress Since Last Session (Related to Symptoms / Goals / Homework):   Symptoms: Improving . Patient's PHQ9 score has been decreasing.    Homework: Partially completed      Episode of Care Goals: Satisfactory progress - CONTEMPLATION (Considering change and yet undecided); Intervened by assessing the negative and positive thinking (ambivalence) about behavior change     Current / Ongoing Stressors and Concerns:  Patient discussed her two sons (ages 3 and 5). Patient discussed her eldest son starting  " soon. Patient discussed communication struggles with her . Patient discussed her 's struggles with irritability. Patient discussed deer hunting season starting in November. Patient discussed a lack of \"me time\". Patient discussed a lack of down time on the weekends to recharge. Patient discussed stressors with her  wanting her to go to bed at the same time as him. Patient discussed past trauma with gonsalez attacks on her birds.       Treatment Objective(s) Addressed in This Session:   Increase interest, engagement, and pleasure in doing things  Decrease frequency and intensity of feeling anxious, nervous, worried       Intervention:   Motivational Interviewing    MI Intervention: Expressed Empathy/Understanding, Supported Autonomy, Collaboration, Evocation, Open-ended questions, and Reflections: simple and complex     Change Talk Expressed by the Patient: Desire to change    Provider Response to Change Talk: E - Evoked more info from patient about behavior change, A - Affirmed patient's thoughts, decisions, or attempts at behavior change, and R - Reflected patient's change talk    Assessments completed prior to visit:  The following assessments were completed by patient for this visit:  PHQ9:       2/19/2024     7:43 AM 2/23/2024     1:46 PM 4/8/2024     7:43 AM 5/10/2024     8:35 AM 6/13/2024     8:46 AM 7/25/2024    11:29 AM 8/7/2024    12:18 PM   PHQ-9 SCORE   PHQ-9 Total Score MyChart 8 (Mild depression)  7 (Mild depression) 4 (Minimal depression) 5 (Mild depression) 4 (Minimal depression) 2 (Minimal depression)   PHQ-9 Total Score 8 6 7 4 5 4 2     GAD7:       3/19/2019     2:54 PM 3/5/2021    11:14 AM 5/10/2022     7:54 AM 8/28/2022     1:11 PM 12/20/2023     3:25 PM 1/24/2024     8:15 AM 7/22/2024    10:49 AM   TRINITY-7 SCORE   Total Score    4 (minimal anxiety) 5 (mild anxiety)  8 (mild anxiety)   Total Score 0 2 3 4 5 4 8     PROMIS 10-Global Health (all questions and answers " displayed):       2/1/2024    11:03 AM 2/14/2024     1:01 PM 2/23/2024     1:46 PM 6/10/2024    11:56 AM 7/18/2024     8:01 AM   PROMIS 10   In general, would you say your health is:     Good   In general, would you say your quality of life is:     Fair   In general, how would you rate your physical health?     Good   In general, how would you rate your mental health, including your mood and your ability to think?     Poor   In general, how would you rate your satisfaction with your social activities and relationships?     Good   In general, please rate how well you carry out your usual social activities and roles     Fair   To what extent are you able to carry out your everyday physical activities such as walking, climbing stairs, carrying groceries, or moving a chair?     Completely   In the past 7 days, how often have you been bothered by emotional problems such as feeling anxious, depressed, or irritable?     Sometimes   In the past 7 days, how would you rate your fatigue on average?     Mild   In the past 7 days, how would you rate your pain on average, where 0 means no pain, and 10 means worst imaginable pain?     0   In general, would you say your health is:     3   In general, would you say your quality of life is:     2   In general, how would you rate your physical health?     3   In general, how would you rate your mental health, including your mood and your ability to think?     1   In general, how would you rate your satisfaction with your social activities and relationships?     3   In general, please rate how well you carry out your usual social activities and roles. (This includes activities at home, at work and in your community, and responsibilities as a parent, child, spouse, employee, friend, etc.)     2   To what extent are you able to carry out your everyday physical activities such as walking, climbing stairs, carrying groceries, or moving a chair?     5   In the past 7 days, how often have  "you been bothered by emotional problems such as feeling anxious, depressed, or irritable?     3   In the past 7 days, how would you rate your fatigue on average?     2   In the past 7 days, how would you rate your pain on average, where 0 means no pain, and 10 means worst imaginable pain?     0   Global Mental Health Score     9   Global Physical Health Score     17   PROMIS TOTAL - SUBSCORES     26       Information is confidential and restricted. Go to Review Flowsheets to unlock data.     [place] Suicide Severity Rating Scale (Lifetime/Recent)      2/3/2019     3:50 AM 1/20/2021     7:53 PM 2/20/2024    10:00 AM 7/25/2024    11:54 AM   [place] Suicide Severity Rating (Lifetime/Recent)   Q1 Wished to be Dead (Past Month) no no     Q2 Suicidal Thoughts (Past Month) no no     Q3 Suicidal Thought Method  no     Q4 Suicidal Intent without Specific Plan  no     Q5 Suicide Intent with Specific Plan  no     Q6 Suicide Behavior (Lifetime) no no     Q1 Wish to be Dead (Lifetime)   Y N   Wish to be Dead Description (Lifetime)   The patient denied having any history of suicide attempts.    1. Wish to be Dead (Past 1 Month)   Y    Wish to be Dead Description (Past 1 Month)   The patient reported she had ONLY had passive suicide ideation, with no plan or intent to harm herself.  The thoughts had been along the lines of \"thongs would be easier if she were not around\".    Q2 Non-Specific Active Suicidal Thoughts (Lifetime)   Y N   Non-Specific Active Suicidal Thought Description (Lifetime)   See above    2. Non-Specific Active Suicidal Thoughts (Past 1 Month)   Y    Non-Specific Active Suicidal Thought Description (Past 1 Month)   See above    3. Active Suicidal Ideation with any Methods (Not Plan) Without Intent to Act (Lifetime)   N    Active Suicidal Ideation with any Methods (Not Plan) Description (Lifetime)   NA    Q3 Active Suicidal Ideation with any Methods (Not Plan) Without Intent to Act (Past 1 Month)   N    Q4 " Active Suicidal Ideation with Some Intent to Act, Without Specific Plan (Lifetime)   N    Active Suicidal Ideation with Some Intent to Act, Without Specific Plan Description (Lifetime)   NA    4. Active Suicidal Ideation with Some Intent to Act, Without Specific Plan (Past 1 Month)   N    Q5 Active Suicidal Ideation with Specific Plan and Intent (Lifetime)   N    Active Suicidal Ideation with Specific Plan and Intent Description (Lifetime)   NA    5. Active Suicidal Ideation with Specific Plan and Intent (Past 1 Month)   N    Most Severe Ideation Rating (Lifetime)   3    Description of Most Severe Ideation (Lifetime)   See abobe    Most Severe Ideation Rating (Past 1 Month)   2    Description of Most Severe Ideation (Past 1 Month)   See above    Frequency (Lifetime)   1    Frequency (Past 1 Month)   1    Duration (Lifetime)   3    Duration (Past 1 Month)   3    Controllability (Lifetime)   3    Controllability (Past 1 Month)   3    Deterrents (Lifetime)   1    Deterrents (Past 1 Month)   1    Reasons for Ideation (Lifetime)   5    Reasons for Ideation (Past 1 Month)   5    Actual Attempt (Lifetime)   N    Has subject engaged in non-suicidal self-injurious behavior? (Lifetime)   N    Interrupted Attempts (Lifetime)   N    Aborted or Self-Interrupted Attempt (Lifetime)   N    Preparatory Acts or Behavior (Lifetime)   N    Calculated C-SSRS Risk Score (Lifetime/Recent)   Low Risk          ASSESSMENT: Current Emotional / Mental Status (status of significant symptoms):   Risk status (Self / Other harm or suicidal ideation)   Patient denies current fears or concerns for personal safety.   Patient denies current or recent suicidal ideation or behaviors.   Patient denies current or recent homicidal ideation or behaviors.   Patient denies current or recent self injurious behavior or ideation.   Patient denies other safety concerns.   Patient reports there has been no change in risk factors since their last session.      Patient reports there has been no change in protective factors since their last session.     Recommended that patient call 911 or go to the local ED should there be a change in any of these risk factors.     Appearance:   Appropriate    Eye Contact:   Good    Psychomotor Behavior: Normal    Attitude:   Cooperative  Friendly   Orientation:   All   Speech    Rate / Production: Normal/ Responsive Talkative    Volume:  Normal    Mood:    Anxious    Affect:    Appropriate    Thought Content:  Clear    Thought Form:  Coherent  Logical    Insight:    Fair      Medication Review:   No changes to current psychiatric medication(s)     Medication Compliance:   Yes     Changes in Health Issues:   None reported     Chemical Use Review:   Substance Use: Chemical use reviewed, no active concerns identified .  However, patient's daily cannabis use will continue to be evaluated for Cannabis Use Disorder. Patient reported successfully completing chemical dependency treatment at [facility] and has reportedly been sober from alcohol use since February 2024.      Tobacco Use: No current tobacco use.      Diagnosis:  1. Generalized anxiety disorder    2. Recurrent major depressive disorder, in partial remission (H24)    Other Diagnoses that is relevant to services: Alcohol Use Disorder, currently in partial remission   Provisional Diagnoses in the process of being ruled-out: ADHD & PTSD      Collateral Reports Completed:   Not Applicable    PLAN: (Patient Tasks / Therapist Tasks / Other)  Patient will return in 3 weeks for next session. Patient will engage in self-care activities. Patient will work on mindfulness skills to increase awareness of triggers to irritability, anxiety, and depression. Patient will work on open and direct communication skills with her . Patient will work on setting clear parameters and expectations with her spouse. Patient will work on mindfulness skills of her hunger cues and tracking  whether she is consuming enough daily calories. Patient will consider increasing to 3 consistent meals throughout the day.      Melanie Lei, [facility]                                                         ______________________________________________________________________    Individual Treatment Plan    Patient's Name: Lanie Martin  YOB: 1990    Date of Creation: 8/1/24  Date Treatment Plan Last Reviewed/Revised: 8/1/24    DSM5 Diagnoses:   Encounter Diagnoses   Name Primary?    Generalized anxiety disorder Yes    Recurrent major depressive disorder, in partial remission (H24)        Psychosocial / Contextual Factors: Substance use, relationship stressors, occupational dynamics.  PROMIS (reviewed every 90 days):   PROMIS-10 as of 7/25/24    Global Mental Health Score (P) 9   Global Physical Health Score (P) 17   PROMIS TOTAL - SUBSCORES (P) 26       Referral / Collaboration:  Referral to another professional/service is not indicated at this time..    Anticipated number of session for this episode of care: 9-12 sessions  Anticipation frequency of session: Biweekly  Anticipated Duration of each session: 38-52 minutes  Treatment plan will be reviewed in 90 days or when goals have been changed.       MeasurableTreatment Goal(s) related to diagnosis / functional impairment(s)  Goal 1: Patient will reduce depression symptoms as evidenced by a reduction in their PHQ9 score to a 2 or less in the next 2 months.     I will know I've met my goal when I'm smiling more.      Objective #A (Patient Action)    Patient will Decrease frequency and intensity of feeling down, depressed, hopeless.  Status: New - Date: 8/1/24      Intervention(s)  Therapist will teach  distress tolerance and coping skills .    Objective #B  Patient will Increase interest, engagement, and pleasure in doing things.  Status: New - Date: 8/1/24     Intervention(s)  Therapist will  teach self-care skills .    Objective #C  Patient will  Improve diet, appetite, mindful eating, and / or meal planning.  Status: New - Date: 8/1/24     Intervention(s)  Therapist will  provide psycho-education on the impact and effects of nutrition on mental health.  .        Goal 2: Patient will reduce anxiety symptoms as evidenced by a reduction in their GAD7 score to a 4 or less in the next 2 months.     I will know I've met my goal when I don't panic when someone walks in for their shift & I'm not stressing out when my  sighs.      Objective #A (Patient Action)    Status: New - Date: 8/1/24     Patient will identify at least 2 triggers for anxiety.    Intervention(s)  Therapist will  teach mindfulness skills .    Objective #B  Patient will use thought-stopping strategy daily to reduce intrusive thoughts.    Status: New - Date: 8/1/24     Intervention(s)  Therapist will  teach thought-stopping and thought-challenging techniques .            Patient has reviewed and agreed to the above plan.      Melanie Lei, [facility]  August 1, 2024

## 2024-09-09 ENCOUNTER — VIRTUAL VISIT (OUTPATIENT)
Dept: BEHAVIORAL HEALTH | Facility: CLINIC | Age: 34
End: 2024-09-09
Payer: COMMERCIAL

## 2024-09-09 DIAGNOSIS — F41.1 GENERALIZED ANXIETY DISORDER: Primary | ICD-10-CM

## 2024-09-09 DIAGNOSIS — F33.41 RECURRENT MAJOR DEPRESSIVE DISORDER, IN PARTIAL REMISSION (H): ICD-10-CM

## 2024-09-09 PROCEDURE — 90834 PSYTX W PT 45 MINUTES: CPT | Mod: 95

## 2024-09-09 NOTE — PROGRESS NOTES
[facility]                                     Progress Note    Patient Name: Lanie Martin  Date: 24           Service Type: Individual      Session Start Time: 2.51  Session End Time: 3.36     Session Length: 38-52 minutes     Session #: 4    Attendees: Client attended alone    Service Modality:  Video Visit:      Provider verified identity through the following two step process.  Patient provided:  Patient  and Patient address    Telemedicine Visit: The patient's condition can be safely assessed and treated via synchronous audio and visual telemedicine encounter.      Reason for Telemedicine Visit: Patient has requested telehealth visit    Originating Site (Patient Location): Patient's place of employment    Distant Site (Provider Location): [facility]    Consent:  The patient/guardian has verbally consented to: the potential risks and benefits of telemedicine (video visit) versus in person care; bill my insurance or make self-payment for services provided; and responsibility for payment of non-covered services.     Patient would like the video invitation sent by:  My Chart    Mode of Communication:  Video Conference via [employer]well    Distant Location (Provider):  Off-site    As the provider I attest to compliance with applicable laws and regulations related to telemedicine.    DATA  Interactive Complexity: No  Crisis: No         Progress Since Last Session (Related to Symptoms / Goals / Homework):   Symptoms: Improving . Patient's PHQ9 score has been decreasing.    Homework: Partially completed      Episode of Care Goals: Satisfactory progress - CONTEMPLATION (Considering change and yet undecided); Intervened by assessing the negative and positive thinking (ambivalence) about behavior change     Current / Ongoing Stressors and Concerns:  Patient discussed her eldest son starting  recently. Patient discussed themes of  "change and transitions. Patient discussed an imbalance in the division of household and childrearing tasks between herself and her . Patient discussed her 's rigidity and struggles with irritability. Patient discussed deer hunting season starting in November. Patient discussed her affinity for hunting. Patient discussed a lack of \"me time\".        Treatment Objective(s) Addressed in This Session:   Increase interest, engagement, and pleasure in doing things  Decrease frequency and intensity of feeling anxious, nervous, worried       Intervention:   Motivational Interviewing    MI Intervention: Expressed Empathy/Understanding, Supported Autonomy, Collaboration, Evocation, Open-ended questions, and Reflections: simple and complex     Change Talk Expressed by the Patient: Desire to change    Provider Response to Change Talk: E - Evoked more info from patient about behavior change, A - Affirmed patient's thoughts, decisions, or attempts at behavior change, and R - Reflected patient's change talk    Assessments completed prior to visit:  The following assessments were completed by patient for this visit:  PHQ9:       2/23/2024     1:46 PM 4/8/2024     7:43 AM 5/10/2024     8:35 AM 6/13/2024     8:46 AM 7/25/2024    11:29 AM 8/7/2024    12:18 PM 9/9/2024     2:46 PM   PHQ-9 SCORE   PHQ-9 Total Score MyChart  7 (Mild depression) 4 (Minimal depression) 5 (Mild depression) 4 (Minimal depression) 2 (Minimal depression) 2 (Minimal depression)   PHQ-9 Total Score 6 7 4 5 4 2 2     GAD7:       3/19/2019     2:54 PM 3/5/2021    11:14 AM 5/10/2022     7:54 AM 8/28/2022     1:11 PM 12/20/2023     3:25 PM 1/24/2024     8:15 AM 7/22/2024    10:49 AM   TRINITY-7 SCORE   Total Score    4 (minimal anxiety) 5 (mild anxiety)  8 (mild anxiety)   Total Score 0 2 3 4 5 4 8     PROMIS 10-Global Health (all questions and answers displayed):       2/1/2024    11:03 AM 2/14/2024     1:01 PM 2/23/2024     1:46 PM 6/10/2024    11:56 AM " 7/18/2024     8:01 AM   PROMIS 10   In general, would you say your health is:     Good   In general, would you say your quality of life is:     Fair   In general, how would you rate your physical health?     Good   In general, how would you rate your mental health, including your mood and your ability to think?     Poor   In general, how would you rate your satisfaction with your social activities and relationships?     Good   In general, please rate how well you carry out your usual social activities and roles     Fair   To what extent are you able to carry out your everyday physical activities such as walking, climbing stairs, carrying groceries, or moving a chair?     Completely   In the past 7 days, how often have you been bothered by emotional problems such as feeling anxious, depressed, or irritable?     Sometimes   In the past 7 days, how would you rate your fatigue on average?     Mild   In the past 7 days, how would you rate your pain on average, where 0 means no pain, and 10 means worst imaginable pain?     0   In general, would you say your health is:     3   In general, would you say your quality of life is:     2   In general, how would you rate your physical health?     3   In general, how would you rate your mental health, including your mood and your ability to think?     1   In general, how would you rate your satisfaction with your social activities and relationships?     3   In general, please rate how well you carry out your usual social activities and roles. (This includes activities at home, at work and in your community, and responsibilities as a parent, child, spouse, employee, friend, etc.)     2   To what extent are you able to carry out your everyday physical activities such as walking, climbing stairs, carrying groceries, or moving a chair?     5   In the past 7 days, how often have you been bothered by emotional problems such as feeling anxious, depressed, or irritable?     3   In the  "past 7 days, how would you rate your fatigue on average?     2   In the past 7 days, how would you rate your pain on average, where 0 means no pain, and 10 means worst imaginable pain?     0   Global Mental Health Score     9   Global Physical Health Score     17   PROMIS TOTAL - SUBSCORES     26       Information is confidential and restricted. Go to Review Flowsheets to unlock data.     Phelps Suicide Severity Rating Scale (Lifetime/Recent)      2/3/2019     3:50 AM 1/20/2021     7:53 PM 2/20/2024    10:00 AM 7/25/2024    11:54 AM   Phelps Suicide Severity Rating (Lifetime/Recent)   Q1 Wished to be Dead (Past Month) no no     Q2 Suicidal Thoughts (Past Month) no no     Q3 Suicidal Thought Method  no     Q4 Suicidal Intent without Specific Plan  no     Q5 Suicide Intent with Specific Plan  no     Q6 Suicide Behavior (Lifetime) no no     Q1 Wish to be Dead (Lifetime)   Y N   Wish to be Dead Description (Lifetime)   The patient denied having any history of suicide attempts.    1. Wish to be Dead (Past 1 Month)   Y    Wish to be Dead Description (Past 1 Month)   The patient reported she had ONLY had passive suicide ideation, with no plan or intent to harm herself.  The thoughts had been along the lines of \"thongs would be easier if she were not around\".    Q2 Non-Specific Active Suicidal Thoughts (Lifetime)   Y N   Non-Specific Active Suicidal Thought Description (Lifetime)   See above    2. Non-Specific Active Suicidal Thoughts (Past 1 Month)   Y    Non-Specific Active Suicidal Thought Description (Past 1 Month)   See above    3. Active Suicidal Ideation with any Methods (Not Plan) Without Intent to Act (Lifetime)   N    Active Suicidal Ideation with any Methods (Not Plan) Description (Lifetime)   NA    Q3 Active Suicidal Ideation with any Methods (Not Plan) Without Intent to Act (Past 1 Month)   N    Q4 Active Suicidal Ideation with Some Intent to Act, Without Specific Plan (Lifetime)   N    Active Suicidal " Ideation with Some Intent to Act, Without Specific Plan Description (Lifetime)   NA    4. Active Suicidal Ideation with Some Intent to Act, Without Specific Plan (Past 1 Month)   N    Q5 Active Suicidal Ideation with Specific Plan and Intent (Lifetime)   N    Active Suicidal Ideation with Specific Plan and Intent Description (Lifetime)   NA    5. Active Suicidal Ideation with Specific Plan and Intent (Past 1 Month)   N    Most Severe Ideation Rating (Lifetime)   3    Description of Most Severe Ideation (Lifetime)   See abobe    Most Severe Ideation Rating (Past 1 Month)   2    Description of Most Severe Ideation (Past 1 Month)   See above    Frequency (Lifetime)   1    Frequency (Past 1 Month)   1    Duration (Lifetime)   3    Duration (Past 1 Month)   3    Controllability (Lifetime)   3    Controllability (Past 1 Month)   3    Deterrents (Lifetime)   1    Deterrents (Past 1 Month)   1    Reasons for Ideation (Lifetime)   5    Reasons for Ideation (Past 1 Month)   5    Actual Attempt (Lifetime)   N    Has subject engaged in non-suicidal self-injurious behavior? (Lifetime)   N    Interrupted Attempts (Lifetime)   N    Aborted or Self-Interrupted Attempt (Lifetime)   N    Preparatory Acts or Behavior (Lifetime)   N    Calculated C-SSRS Risk Score (Lifetime/Recent)   Low Risk          ASSESSMENT: Current Emotional / Mental Status (status of significant symptoms):   Risk status (Self / Other harm or suicidal ideation)   Patient denies current fears or concerns for personal safety.   Patient denies current or recent suicidal ideation or behaviors.   Patient denies current or recent homicidal ideation or behaviors.   Patient denies current or recent self injurious behavior or ideation.   Patient denies other safety concerns.   Patient reports there has been no change in risk factors since their last session.     Patient reports there has been no change in protective factors since their last session.     Recommended that  patient call 911 or go to the local ED should there be a change in any of these risk factors.     Appearance:   Appropriate    Eye Contact:   Good    Psychomotor Behavior: Normal    Attitude:   Cooperative  Friendly   Orientation:   All   Speech    Rate / Production: Normal/ Responsive Talkative    Volume:  Normal    Mood:    Anxious    Affect:    Appropriate    Thought Content:  Clear    Thought Form:  Coherent  Logical    Insight:    Fair      Medication Review:   No changes to current psychiatric medication(s)     Medication Compliance:   Yes     Changes in Health Issues:   None reported     Chemical Use Review:   Substance Use: Chemical use reviewed, no active concerns identified .  However, patient's daily cannabis use will continue to be evaluated for Cannabis Use Disorder. Patient reported successfully completing chemical dependency treatment at [facility] and has reportedly been sober from alcohol use since February 2024.      Tobacco Use: No current tobacco use.      Diagnosis:  1. Generalized anxiety disorder    2. Recurrent major depressive disorder, in partial remission (H24)    Other Diagnoses that is relevant to services: Alcohol Use Disorder, currently in partial remission   Provisional Diagnoses in the process of being ruled-out: ADHD & PTSD      Collateral Reports Completed:   Not Applicable    PLAN: (Patient Tasks / Therapist Tasks / Other)  Patient will return in 2 weeks for next session. Patient will engage in self-care activities. Patient will work on mindfulness skills to increase awareness of triggers to irritability, anxiety, and depression. Patient will work on open and direct communication skills with her . Patient will work on setting clear parameters and expectations with her spouse. Patient will work on mindfulness skills of her hunger cues and tracking whether she is consuming enough daily calories. Patient will consider increasing to 3 consistent meals throughout the  day.       Melanie Lei, [facility]                                                         ______________________________________________________________________    Individual Treatment Plan    Patient's Name: Lanie Martin  YOB: 1990    Date of Creation: 8/1/24  Date Treatment Plan Last Reviewed/Revised: 8/1/24    DSM5 Diagnoses:   Encounter Diagnoses   Name Primary?    Generalized anxiety disorder Yes    Recurrent major depressive disorder, in partial remission (H24)        Psychosocial / Contextual Factors: Substance use, relationship stressors, occupational dynamics.  PROMIS (reviewed every 90 days):   PROMIS-10 as of 7/25/24    Global Mental Health Score (P) 9   Global Physical Health Score (P) 17   PROMIS TOTAL - SUBSCORES (P) 26       Referral / Collaboration:  Referral to another professional/service is not indicated at this time..    Anticipated number of session for this episode of care: 9-12 sessions  Anticipation frequency of session: Biweekly  Anticipated Duration of each session: 38-52 minutes  Treatment plan will be reviewed in 90 days or when goals have been changed.       MeasurableTreatment Goal(s) related to diagnosis / functional impairment(s)  Goal 1: Patient will reduce depression symptoms as evidenced by a reduction in their PHQ9 score to a 2 or less in the next 2 months.     I will know I've met my goal when I'm smiling more.      Objective #A (Patient Action)    Patient will Decrease frequency and intensity of feeling down, depressed, hopeless.  Status: New - Date: 8/1/24      Intervention(s)  Therapist will teach  distress tolerance and coping skills .    Objective #B  Patient will Increase interest, engagement, and pleasure in doing things.  Status: New - Date: 8/1/24     Intervention(s)  Therapist will  teach self-care skills .    Objective #C  Patient will Improve diet, appetite, mindful eating, and / or meal planning.  Status: New - Date: 8/1/24      Intervention(s)  Therapist will  provide psycho-education on the impact and effects of nutrition on mental health.  .        Goal 2: Patient will reduce anxiety symptoms as evidenced by a reduction in their GAD7 score to a 4 or less in the next 2 months.     I will know I've met my goal when I don't panic when someone walks in for their shift & I'm not stressing out when my  sighs.      Objective #A (Patient Action)    Status: New - Date: 8/1/24     Patient will identify at least 2 triggers for anxiety.    Intervention(s)  Therapist will  teach mindfulness skills .    Objective #B  Patient will use thought-stopping strategy daily to reduce intrusive thoughts.    Status: New - Date: 8/1/24     Intervention(s)  Therapist will  teach thought-stopping and thought-challenging techniques .            Patient has reviewed and agreed to the above plan.      Melanie Lei, [facility]  August 1, 2024

## 2024-09-23 ENCOUNTER — VIRTUAL VISIT (OUTPATIENT)
Dept: BEHAVIORAL HEALTH | Facility: CLINIC | Age: 34
End: 2024-09-23
Payer: COMMERCIAL

## 2024-09-23 DIAGNOSIS — F41.1 GENERALIZED ANXIETY DISORDER: Primary | ICD-10-CM

## 2024-09-23 DIAGNOSIS — F33.41 RECURRENT MAJOR DEPRESSIVE DISORDER, IN PARTIAL REMISSION (H): ICD-10-CM

## 2024-09-23 PROCEDURE — 90834 PSYTX W PT 45 MINUTES: CPT | Mod: 95

## 2024-09-23 NOTE — PROGRESS NOTES
[facility]                                     Progress Note    Patient Name: Lanie Martin  Date: 24           Service Type: Individual      Session Start Time: 9.56  Session End Time: 10.40     Session Length: 38-52 minutes     Session #: 5    Attendees: Client attended alone    Service Modality:  Video Visit:      Provider verified identity through the following two step process.  Patient provided:  Patient  and Patient address    Telemedicine Visit: The patient's condition can be safely assessed and treated via synchronous audio and visual telemedicine encounter.      Reason for Telemedicine Visit: Patient has requested telehealth visit    Originating Site (Patient Location): Patient's place of employment    Distant Site (Provider Location): [facility]    Consent:  The patient/guardian has verbally consented to: the potential risks and benefits of telemedicine (video visit) versus in person care; bill my insurance or make self-payment for services provided; and responsibility for payment of non-covered services.     Patient would like the video invitation sent by:  My Chart    Mode of Communication:  Video Conference via Amwell    Distant Location (Provider):  Off-site    As the provider I attest to compliance with applicable laws and regulations related to telemedicine.    DATA  Interactive Complexity: No  Crisis: No         Progress Since Last Session (Related to Symptoms / Goals / Homework):   Symptoms: Improving . Patient's PHQ9 score has been decreasing, but patient's GAD7 score has increased.    Homework: Partially completed      Episode of Care Goals: Satisfactory progress - CONTEMPLATION (Considering change and yet undecided); Intervened by assessing the negative and positive thinking (ambivalence) about behavior change     Current / Ongoing Stressors and Concerns:  Patient discussed her eldest son starting  ". Patient discussed her son having behavioral struggles in school and thoughts about him possibly having ADHD. Patient discussed thoughts about getting her son into therapy. Patient discussed an imbalance in the division of household and childrearing tasks between herself and her . Patient discussed being a \"[place] for the [place] [employer]. Patient discussed deer hunting season starting in November. Patient discussed a lack of \"me time\". Patient discussed her younger sister's dislike of the patient's . Patient discussed her  managing family finances.        Treatment Objective(s) Addressed in This Session:   Increase interest, engagement, and pleasure in doing things  Decrease frequency and intensity of feeling anxious, nervous, worried       Intervention:   Motivational Interviewing    MI Intervention: Expressed Empathy/Understanding, Supported Autonomy, Collaboration, Evocation, Open-ended questions, and Reflections: simple and complex     Change Talk Expressed by the Patient: Desire to change    Provider Response to Change Talk: E - Evoked more info from patient about behavior change, A - Affirmed patient's thoughts, decisions, or attempts at behavior change, and R - Reflected patient's change talk    Assessments completed prior to visit:  The following assessments were completed by patient for this visit:  PHQ9:       2/23/2024     1:46 PM 4/8/2024     7:43 AM 5/10/2024     8:35 AM 6/13/2024     8:46 AM 7/25/2024    11:29 AM 8/7/2024    12:18 PM 9/9/2024     2:46 PM   PHQ-9 SCORE   [facility]  7 (Mild depression) 4 (Minimal depression) 5 (Mild depression) 4 (Minimal depression) 2 (Minimal depression) 2 (Minimal depression)   PHQ-9 Total Score 6 7 4 5 4 2 2     GAD7:       3/19/2019     2:54 PM 3/5/2021    11:14 AM 5/10/2022     7:54 AM 8/28/2022     1:11 PM 12/20/2023     3:25 PM 1/24/2024     8:15 AM 7/22/2024    10:49 AM   TRINITY-7 SCORE   Total Score   "  4 (minimal anxiety) 5 (mild anxiety)  8 (mild anxiety)   Total Score 0 2 3 4 5 4 8     PROMIS 10-Global Health (all questions and answers displayed):       2/1/2024    11:03 AM 2/14/2024     1:01 PM 2/23/2024     1:46 PM 6/10/2024    11:56 AM 7/18/2024     8:01 AM   PROMIS 10   In general, would you say your health is:     Good   In general, would you say your quality of life is:     Fair   In general, how would you rate your physical health?     Good   In general, how would you rate your mental health, including your mood and your ability to think?     Poor   In general, how would you rate your satisfaction with your social activities and relationships?     Good   In general, please rate how well you carry out your usual social activities and roles     Fair   To what extent are you able to carry out your everyday physical activities such as walking, climbing stairs, carrying groceries, or moving a chair?     Completely   In the past 7 days, how often have you been bothered by emotional problems such as feeling anxious, depressed, or irritable?     Sometimes   In the past 7 days, how would you rate your fatigue on average?     Mild   In the past 7 days, how would you rate your pain on average, where 0 means no pain, and 10 means worst imaginable pain?     0   In general, would you say your health is:     3   In general, would you say your quality of life is:     2   In general, how would you rate your physical health?     3   In general, how would you rate your mental health, including your mood and your ability to think?     1   In general, how would you rate your satisfaction with your social activities and relationships?     3   In general, please rate how well you carry out your usual social activities and roles. (This includes activities at home, at work and in your community, and responsibilities as a parent, child, spouse, employee, friend, etc.)     2   To what extent are you able to carry out your everyday  "physical activities such as walking, climbing stairs, carrying groceries, or moving a chair?     5   In the past 7 days, how often have you been bothered by emotional problems such as feeling anxious, depressed, or irritable?     3   In the past 7 days, how would you rate your fatigue on average?     2   In the past 7 days, how would you rate your pain on average, where 0 means no pain, and 10 means worst imaginable pain?     0   Global Mental Health Score     9   Global Physical Health Score     17   PROMIS TOTAL - SUBSCORES     26       Information is confidential and restricted. Go to Review Flowsheets to unlock data.     [place] Suicide Severity Rating Scale (Lifetime/Recent)      2/3/2019     3:50 AM 1/20/2021     7:53 PM 2/20/2024    10:00 AM 7/25/2024    11:54 AM   [place] Suicide Severity Rating (Lifetime/Recent)   Q1 Wished to be Dead (Past Month) no no     Q2 Suicidal Thoughts (Past Month) no no     Q3 Suicidal Thought Method  no     Q4 Suicidal Intent without Specific Plan  no     Q5 Suicide Intent with Specific Plan  no     Q6 Suicide Behavior (Lifetime) no no     Q1 Wish to be Dead (Lifetime)   Y N   Wish to be Dead Description (Lifetime)   The patient denied having any history of suicide attempts.    1. Wish to be Dead (Past 1 Month)   Y    Wish to be Dead Description (Past 1 Month)   The patient reported she had ONLY had passive suicide ideation, with no plan or intent to harm herself.  The thoughts had been along the lines of \"thongs would be easier if she were not around\".    Q2 Non-Specific Active Suicidal Thoughts (Lifetime)   Y N   Non-Specific Active Suicidal Thought Description (Lifetime)   See above    2. Non-Specific Active Suicidal Thoughts (Past 1 Month)   Y    Non-Specific Active Suicidal Thought Description (Past 1 Month)   See above    3. Active Suicidal Ideation with any Methods (Not Plan) Without Intent to Act (Lifetime)   N    Active Suicidal Ideation with any Methods (Not Plan) " Description (Lifetime)   NA    Q3 Active Suicidal Ideation with any Methods (Not Plan) Without Intent to Act (Past 1 Month)   N    Q4 Active Suicidal Ideation with Some Intent to Act, Without Specific Plan (Lifetime)   N    Active Suicidal Ideation with Some Intent to Act, Without Specific Plan Description (Lifetime)   NA    4. Active Suicidal Ideation with Some Intent to Act, Without Specific Plan (Past 1 Month)   N    Q5 Active Suicidal Ideation with Specific Plan and Intent (Lifetime)   N    Active Suicidal Ideation with Specific Plan and Intent Description (Lifetime)   NA    5. Active Suicidal Ideation with Specific Plan and Intent (Past 1 Month)   N    Most Severe Ideation Rating (Lifetime)   3    Description of Most Severe Ideation (Lifetime)   See abobe    Most Severe Ideation Rating (Past 1 Month)   2    Description of Most Severe Ideation (Past 1 Month)   See above    Frequency (Lifetime)   1    Frequency (Past 1 Month)   1    Duration (Lifetime)   3    Duration (Past 1 Month)   3    Controllability (Lifetime)   3    Controllability (Past 1 Month)   3    Deterrents (Lifetime)   1    Deterrents (Past 1 Month)   1    Reasons for Ideation (Lifetime)   5    Reasons for Ideation (Past 1 Month)   5    Actual Attempt (Lifetime)   N    Has subject engaged in non-suicidal self-injurious behavior? (Lifetime)   N    Interrupted Attempts (Lifetime)   N    Aborted or Self-Interrupted Attempt (Lifetime)   N    Preparatory Acts or Behavior (Lifetime)   N    Calculated C-SSRS Risk Score (Lifetime/Recent)   Low Risk          ASSESSMENT: Current Emotional / Mental Status (status of significant symptoms):   Risk status (Self / Other harm or suicidal ideation)   Patient denies current fears or concerns for personal safety.   Patient denies current or recent suicidal ideation or behaviors.   Patient denies current or recent homicidal ideation or behaviors.   Patient denies current or recent self injurious behavior or  ideation.   Patient denies other safety concerns.   Patient reports there has been no change in risk factors since their last session.     Patient reports there has been no change in protective factors since their last session.     Recommended that patient call 911 or go to the local ED should there be a change in any of these risk factors.     Appearance:   Appropriate    Eye Contact:   Good    Psychomotor Behavior: Normal    Attitude:   Cooperative  Friendly   Orientation:   All   Speech    Rate / Production: Normal/ Responsive Talkative    Volume:  Normal    Mood:    Anxious    Affect:    Appropriate    Thought Content:  Clear    Thought Form:  Coherent  Logical    Insight:    Fair      Medication Review:   No changes to current psychiatric medication(s)     Medication Compliance:   Yes     Changes in Health Issues:   None reported     Chemical Use Review:   Substance Use: Chemical use reviewed, no active concerns identified .  However, patient's daily cannabis use will continue to be evaluated for Cannabis Use Disorder. Patient reported successfully completing chemical dependency treatment at [facility] and has reportedly been sober from alcohol use since February 2024.      Tobacco Use: No current tobacco use.      Diagnosis:  1. Generalized anxiety disorder    2. Recurrent major depressive disorder, in partial remission (H24)    Other Diagnoses that is relevant to services: Alcohol Use Disorder, currently in partial remission   Provisional Diagnoses in the process of being ruled-out: ADHD & PTSD      Collateral Reports Completed:   Not Applicable    PLAN: (Patient Tasks / Therapist Tasks / Other)  Patient will return in 2 weeks for next session. Patient will engage in self-care activities. Patient will work on mindfulness skills to increase awareness of triggers to anxiety, irritability, and depression. Patient will work on open and direct communication skills with her . Patient will work on  setting clear parameters and expectations with her spouse. Patient will work on mindfulness skills of her hunger cues and tracking whether she is consuming enough daily calories. Patient will consider increasing to 3 consistent meals throughout the day.      Melanie Lei, [facility]                                                         ______________________________________________________________________    Individual Treatment Plan    Patient's Name: Lanie Martin  YOB: 1990    Date of Creation: 8/1/24  Date Treatment Plan Last Reviewed/Revised: 8/1/24    DSM5 Diagnoses:   Encounter Diagnoses   Name Primary?    Generalized anxiety disorder Yes    Recurrent major depressive disorder, in partial remission (H24)        Psychosocial / Contextual Factors: Substance use, relationship stressors, occupational dynamics.  PROMIS (reviewed every 90 days):   PROMIS-10 as of 7/25/24    Global Mental Health Score (P) 9   Global Physical Health Score (P) 17   PROMIS TOTAL - SUBSCORES (P) 26       Referral / Collaboration:  Referral to another professional/service is not indicated at this time..    Anticipated number of session for this episode of care: 9-12 sessions  Anticipation frequency of session: Biweekly  Anticipated Duration of each session: 38-52 minutes  Treatment plan will be reviewed in 90 days or when goals have been changed.       MeasurableTreatment Goal(s) related to diagnosis / functional impairment(s)  Goal 1: Patient will reduce depression symptoms as evidenced by a reduction in their PHQ9 score to a 2 or less in the next 2 months.     I will know I've met my goal when I'm smiling more.      Objective #A (Patient Action)    Patient will Decrease frequency and intensity of feeling down, depressed, hopeless.  Status: New - Date: 8/1/24      Intervention(s)  Therapist will teach  distress tolerance and coping skills .    Objective #B  Patient will Increase interest, engagement, and pleasure in  doing things.  Status: New - Date: 8/1/24     Intervention(s)  Therapist will  teach self-care skills .    Objective #C  Patient will Improve diet, appetite, mindful eating, and / or meal planning.  Status: New - Date: 8/1/24     Intervention(s)  Therapist will  provide psycho-education on the impact and effects of nutrition on mental health.  .        Goal 2: Patient will reduce anxiety symptoms as evidenced by a reduction in their GAD7 score to a 4 or less in the next 2 months.     I will know I've met my goal when I don't panic when someone walks in for their shift & I'm not stressing out when my  sighs.      Objective #A (Patient Action)    Status: New - Date: 8/1/24     Patient will identify at least 2 triggers for anxiety.    Intervention(s)  Therapist will  teach mindfulness skills .    Objective #B  Patient will use thought-stopping strategy daily to reduce intrusive thoughts.    Status: New - Date: 8/1/24     Intervention(s)  Therapist will  teach thought-stopping and thought-challenging techniques .            Patient has reviewed and agreed to the above plan.      Melanie Lei, [facility]  August 1, 2024

## 2024-10-07 ENCOUNTER — VIRTUAL VISIT (OUTPATIENT)
Dept: BEHAVIORAL HEALTH | Facility: CLINIC | Age: 34
End: 2024-10-07
Payer: COMMERCIAL

## 2024-10-07 DIAGNOSIS — F33.41 RECURRENT MAJOR DEPRESSIVE DISORDER, IN PARTIAL REMISSION (H): ICD-10-CM

## 2024-10-07 DIAGNOSIS — F41.1 GENERALIZED ANXIETY DISORDER: Primary | ICD-10-CM

## 2024-10-07 PROCEDURE — 90834 PSYTX W PT 45 MINUTES: CPT | Mod: 95

## 2024-10-07 NOTE — PROGRESS NOTES
[facility]                                     Progress Note    Patient Name: Lanie Martin  Date: 10/07/24           Service Type: Individual      Session Start Time: 1.59  Session End Time: 2.47     Session Length: 38-52 minutes     Session #: 6    Attendees: Client attended alone    Service Modality:  Video Visit:      Provider verified identity through the following two step process.  Patient provided:  Patient  and Patient address    Telemedicine Visit: The patient's condition can be safely assessed and treated via synchronous audio and visual telemedicine encounter.      Reason for Telemedicine Visit: Patient has requested telehealth visit    Originating Site (Patient Location): Patient's place of employment    Distant Site (Provider Location): [facility]    Consent:  The patient/guardian has verbally consented to: the potential risks and benefits of telemedicine (video visit) versus in person care; bill my insurance or make self-payment for services provided; and responsibility for payment of non-covered services.     Patient would like the video invitation sent by:  My Chart    Mode of Communication:  Video Conference via Amwell    Distant Location (Provider):  Off-site    As the provider I attest to compliance with applicable laws and regulations related to telemedicine.    DATA  Interactive Complexity: No  Crisis: No         Progress Since Last Session (Related to Symptoms / Goals / Homework):   Symptoms: Improving . Patient's PHQ9 score has been decreasing, but patient's GAD7 score has increased.    Homework: Partially completed      Episode of Care Goals: Satisfactory progress - CONTEMPLATION (Considering change and yet undecided); Intervened by assessing the negative and positive thinking (ambivalence) about behavior change     Current / Ongoing Stressors and Concerns:  Patient discussed getting prepared for deer hunting  "season starting in November. Patient discussed a desire to get into bear hunting. Patient discussed her eldest son starting . Patient discussed her son having behavioral struggles in school. Patient discussed attending a recent festival. Patient discussed noticing minimal alcohol cravings. Patient discussed stopping her Naltrexone medication. Patient discussed a lack of \"me time\".        Treatment Objective(s) Addressed in This Session:   Increase interest, engagement, and pleasure in doing things  Decrease frequency and intensity of feeling anxious, nervous, worried       Intervention:   Motivational Interviewing    MI Intervention: Expressed Empathy/Understanding, Supported Autonomy, Collaboration, Evocation, Open-ended questions, and Reflections: simple and complex     Change Talk Expressed by the Patient: Desire to change    Provider Response to Change Talk: E - Evoked more info from patient about behavior change, A - Affirmed patient's thoughts, decisions, or attempts at behavior change, and R - Reflected patient's change talk    Assessments completed prior to visit:  The following assessments were completed by patient for this visit:  PHQ9:       2/23/2024     1:46 PM 4/8/2024     7:43 AM 5/10/2024     8:35 AM 6/13/2024     8:46 AM 7/25/2024    11:29 AM 8/7/2024    12:18 PM 9/9/2024     2:46 PM   PHQ-9 SCORE   PHQ-9 Total Score MyChart  7 (Mild depression) 4 (Minimal depression) 5 (Mild depression) 4 (Minimal depression) 2 (Minimal depression) 2 (Minimal depression)   PHQ-9 Total Score 6 7 4 5 4 2 2     GAD7:       3/19/2019     2:54 PM 3/5/2021    11:14 AM 5/10/2022     7:54 AM 8/28/2022     1:11 PM 12/20/2023     3:25 PM 1/24/2024     8:15 AM 7/22/2024    10:49 AM   TRINITY-7 SCORE   Total Score    4 (minimal anxiety) 5 (mild anxiety)  8 (mild anxiety)   Total Score 0 2 3 4 5 4 8     PROMIS 10-Global Health (all questions and answers displayed):       2/1/2024    11:03 AM 2/14/2024     1:01 PM " 2/23/2024     1:46 PM 6/10/2024    11:56 AM 7/18/2024     8:01 AM   PROMIS 10   In general, would you say your health is:     Good   In general, would you say your quality of life is:     Fair   In general, how would you rate your physical health?     Good   In general, how would you rate your mental health, including your mood and your ability to think?     Poor   In general, how would you rate your satisfaction with your social activities and relationships?     Good   In general, please rate how well you carry out your usual social activities and roles     Fair   To what extent are you able to carry out your everyday physical activities such as walking, climbing stairs, carrying groceries, or moving a chair?     Completely   In the past 7 days, how often have you been bothered by emotional problems such as feeling anxious, depressed, or irritable?     Sometimes   In the past 7 days, how would you rate your fatigue on average?     Mild   In the past 7 days, how would you rate your pain on average, where 0 means no pain, and 10 means worst imaginable pain?     0   In general, would you say your health is:     3   In general, would you say your quality of life is:     2   In general, how would you rate your physical health?     3   In general, how would you rate your mental health, including your mood and your ability to think?     1   In general, how would you rate your satisfaction with your social activities and relationships?     3   In general, please rate how well you carry out your usual social activities and roles. (This includes activities at home, at work and in your community, and responsibilities as a parent, child, spouse, employee, friend, etc.)     2   To what extent are you able to carry out your everyday physical activities such as walking, climbing stairs, carrying groceries, or moving a chair?     5   In the past 7 days, how often have you been bothered by emotional problems such as feeling  "anxious, depressed, or irritable?     3   In the past 7 days, how would you rate your fatigue on average?     2   In the past 7 days, how would you rate your pain on average, where 0 means no pain, and 10 means worst imaginable pain?     0   Global Mental Health Score     9   Global Physical Health Score     17   PROMIS TOTAL - SUBSCORES     26       Information is confidential and restricted. Go to Review Flowsheets to unlock data.     Callahan Suicide Severity Rating Scale (Lifetime/Recent)      2/3/2019     3:50 AM 1/20/2021     7:53 PM 2/20/2024    10:00 AM 7/25/2024    11:54 AM   Callahan Suicide Severity Rating (Lifetime/Recent)   Q1 Wished to be Dead (Past Month) no no     Q2 Suicidal Thoughts (Past Month) no no     Q3 Suicidal Thought Method  no     Q4 Suicidal Intent without Specific Plan  no     Q5 Suicide Intent with Specific Plan  no     Q6 Suicide Behavior (Lifetime) no no     Q1 Wish to be Dead (Lifetime)   Y N   Wish to be Dead Description (Lifetime)   The patient denied having any history of suicide attempts.    1. Wish to be Dead (Past 1 Month)   Y    Wish to be Dead Description (Past 1 Month)   The patient reported she had ONLY had passive suicide ideation, with no plan or intent to harm herself.  The thoughts had been along the lines of \"thongs would be easier if she were not around\".    Q2 Non-Specific Active Suicidal Thoughts (Lifetime)   Y N   Non-Specific Active Suicidal Thought Description (Lifetime)   See above    2. Non-Specific Active Suicidal Thoughts (Past 1 Month)   Y    Non-Specific Active Suicidal Thought Description (Past 1 Month)   See above    3. Active Suicidal Ideation with any Methods (Not Plan) Without Intent to Act (Lifetime)   N    Active Suicidal Ideation with any Methods (Not Plan) Description (Lifetime)   NA    Q3 Active Suicidal Ideation with any Methods (Not Plan) Without Intent to Act (Past 1 Month)   N    Q4 Active Suicidal Ideation with Some Intent to Act, Without " Specific Plan (Lifetime)   N    Active Suicidal Ideation with Some Intent to Act, Without Specific Plan Description (Lifetime)   NA    4. Active Suicidal Ideation with Some Intent to Act, Without Specific Plan (Past 1 Month)   N    Q5 Active Suicidal Ideation with Specific Plan and Intent (Lifetime)   N    Active Suicidal Ideation with Specific Plan and Intent Description (Lifetime)   NA    5. Active Suicidal Ideation with Specific Plan and Intent (Past 1 Month)   N    Most Severe Ideation Rating (Lifetime)   3    Description of Most Severe Ideation (Lifetime)   See abobe    Most Severe Ideation Rating (Past 1 Month)   2    Description of Most Severe Ideation (Past 1 Month)   See above    Frequency (Lifetime)   1    Frequency (Past 1 Month)   1    Duration (Lifetime)   3    Duration (Past 1 Month)   3    Controllability (Lifetime)   3    Controllability (Past 1 Month)   3    Deterrents (Lifetime)   1    Deterrents (Past 1 Month)   1    Reasons for Ideation (Lifetime)   5    Reasons for Ideation (Past 1 Month)   5    Actual Attempt (Lifetime)   N    Has subject engaged in non-suicidal self-injurious behavior? (Lifetime)   N    Interrupted Attempts (Lifetime)   N    Aborted or Self-Interrupted Attempt (Lifetime)   N    Preparatory Acts or Behavior (Lifetime)   N    Calculated C-SSRS Risk Score (Lifetime/Recent)   Low Risk          ASSESSMENT: Current Emotional / Mental Status (status of significant symptoms):   Risk status (Self / Other harm or suicidal ideation)   Patient denies current fears or concerns for personal safety.   Patient denies current or recent suicidal ideation or behaviors.   Patient denies current or recent homicidal ideation or behaviors.   Patient denies current or recent self injurious behavior or ideation.   Patient denies other safety concerns.   Patient reports there has been no change in risk factors since their last session.     Patient reports there has been no change in protective factors  since their last session.     Recommended that patient call 911 or go to the local ED should there be a change in any of these risk factors.     Appearance:   Appropriate    Eye Contact:   Good    Psychomotor Behavior: Normal    Attitude:   Cooperative  Friendly   Orientation:   All   Speech    Rate / Production: Normal/ Responsive Talkative    Volume:  Normal    Mood:    Anxious    Affect:    Appropriate    Thought Content:  Clear    Thought Form:  Coherent  Logical    Insight:    Fair      Medication Review:   No changes to current psychiatric medication(s)     Medication Compliance:   Yes     Changes in Health Issues:   None reported     Chemical Use Review:   Substance Use: Chemical use reviewed, no active concerns identified .  However, patient's daily cannabis use will continue to be evaluated for Cannabis Use Disorder. Patient reported successfully completing chemical dependency treatment at [facility] and has reportedly been sober from alcohol use since February 2024.      Tobacco Use: No current tobacco use.      Diagnosis:  1. Generalized anxiety disorder    2. Recurrent major depressive disorder, in partial remission (H)    Other Diagnoses that is relevant to services: Alcohol Use Disorder, currently in partial remission   Provisional Diagnoses in the process of being ruled-out: ADHD & PTSD      Collateral Reports Completed:   Not Applicable    PLAN: (Patient Tasks / Therapist Tasks / Other)  Patient will return in 2 weeks for next session. Patient will engage in self-care activities. Patient will work on mindfulness skills to increase awareness of triggers to anxiety, irritability, and depression. Patient will work on open and direct communication skills with her . Patient will work on setting clear parameters and expectations with her spouse. Patient will work on mindfulness skills of her hunger cues and tracking whether she is consuming enough daily calories. Patient will consider  increasing to 3 consistent meals throughout the day.      Melanie ENOCH Trianail, [facility]                                                         ______________________________________________________________________    Individual Treatment Plan    Patient's Name: Lanie Martin  YOB: 1990    Date of Creation: 8/1/24  Date Treatment Plan Last Reviewed/Revised: 8/1/24    DSM5 Diagnoses:   Encounter Diagnoses   Name Primary?    Generalized anxiety disorder Yes    Recurrent major depressive disorder, in partial remission (H)        Psychosocial / Contextual Factors: Substance use, relationship stressors, occupational dynamics.  PROMIS (reviewed every 90 days):   PROMIS-10 as of 7/25/24    Global Mental Health Score (P) 9   Global Physical Health Score (P) 17   PROMIS TOTAL - SUBSCORES (P) 26       Referral / Collaboration:  Referral to another professional/service is not indicated at this time..    Anticipated number of session for this episode of care: 9-12 sessions  Anticipation frequency of session: Biweekly  Anticipated Duration of each session: 38-52 minutes  Treatment plan will be reviewed in 90 days or when goals have been changed.       MeasurableTreatment Goal(s) related to diagnosis / functional impairment(s)  Goal 1: Patient will reduce depression symptoms as evidenced by a reduction in their PHQ9 score to a 2 or less in the next 2 months.     I will know I've met my goal when I'm smiling more.      Objective #A (Patient Action)    Patient will Decrease frequency and intensity of feeling down, depressed, hopeless.  Status: New - Date: 8/1/24      Intervention(s)  Therapist will teach  distress tolerance and coping skills .    Objective #B  Patient will Increase interest, engagement, and pleasure in doing things.  Status: New - Date: 8/1/24     Intervention(s)  Therapist will  teach self-care skills .    Objective #C  Patient will Improve diet, appetite, mindful eating, and / or meal planning.  Status:  New - Date: 8/1/24     Intervention(s)  Therapist will  provide psycho-education on the impact and effects of nutrition on mental health.  .        Goal 2: Patient will reduce anxiety symptoms as evidenced by a reduction in their GAD7 score to a 4 or less in the next 2 months.     I will know I've met my goal when I don't panic when someone walks in for their shift & I'm not stressing out when my  sighs.      Objective #A (Patient Action)    Status: New - Date: 8/1/24     Patient will identify at least 2 triggers for anxiety.    Intervention(s)  Therapist will  teach mindfulness skills .    Objective #B  Patient will use thought-stopping strategy daily to reduce intrusive thoughts.    Status: New - Date: 8/1/24     Intervention(s)  Therapist will  teach thought-stopping and thought-challenging techniques .            Patient has reviewed and agreed to the above plan.      Melanie Lei, [facility]  August 1, 2024

## 2024-10-09 ENCOUNTER — VIRTUAL VISIT (OUTPATIENT)
Dept: ADDICTION MEDICINE | Facility: CLINIC | Age: 34
End: 2024-10-09
Payer: COMMERCIAL

## 2024-10-09 DIAGNOSIS — F33.41 RECURRENT MAJOR DEPRESSIVE DISORDER, IN PARTIAL REMISSION (H): ICD-10-CM

## 2024-10-09 DIAGNOSIS — F12.20 CANNABIS USE DISORDER, SEVERE, DEPENDENCE (H): ICD-10-CM

## 2024-10-09 DIAGNOSIS — F90.9 ATTENTION DEFICIT HYPERACTIVITY DISORDER (ADHD), UNSPECIFIED ADHD TYPE: ICD-10-CM

## 2024-10-09 DIAGNOSIS — F10.21 ALCOHOL USE DISORDER, SEVERE, IN EARLY REMISSION, DEPENDENCE (H): Primary | ICD-10-CM

## 2024-10-09 PROCEDURE — 99214 OFFICE O/P EST MOD 30 MIN: CPT | Mod: 95

## 2024-10-09 PROCEDURE — G2211 COMPLEX E/M VISIT ADD ON: HCPCS | Mod: 95

## 2024-10-09 RX ORDER — BUPROPION HYDROCHLORIDE 300 MG/1
300 TABLET ORAL EVERY MORNING
Qty: 90 TABLET | Refills: 0 | Status: SHIPPED | OUTPATIENT
Start: 2024-10-09 | End: 2024-11-04

## 2024-10-09 ASSESSMENT — PATIENT HEALTH QUESTIONNAIRE - PHQ9
SUM OF ALL RESPONSES TO PHQ QUESTIONS 1-9: 3
10. IF YOU CHECKED OFF ANY PROBLEMS, HOW DIFFICULT HAVE THESE PROBLEMS MADE IT FOR YOU TO DO YOUR WORK, TAKE CARE OF THINGS AT HOME, OR GET ALONG WITH OTHER PEOPLE: NOT DIFFICULT AT ALL
SUM OF ALL RESPONSES TO PHQ QUESTIONS 1-9: 3

## 2024-10-09 NOTE — PATIENT INSTRUCTIONS
Patient Education   Addiction Medicine  What to Expect  Here's what to expect from our Addiction Medicine program.  About Addiction Medicine  Addiction Medicine clinics help you with substance use problems. You set your own goals. We try to help you reach your goals. Your care plan can include:  Medicine  Creating a recovery plan  Helping you find local resources  Helping with treatment options  Clinic phone number and addresses  Clinic Phone: [phone]  Mental Health and Addiction Clinic  [facility]  [address], [address]  [place] [address]  [facility]  [address] [facility], [address]  [place] [address]  Walk-in services  We offer walk-in care for patients at the Recovery Clinic. This is only for patients with Opioid Use Disorder (OUD). Anyone with OUD is welcome. Our providers will refer you to the Recovery Clinic if you're struggling to keep up with your medicines or appointments.  [facility] ([facility] [facility])  [address], [address]  [place] [address]  Phone: [phone]  The Recovery Clinic is open for walk-ins Monday to Friday 9 a.m. to 11:30 a.m. and 12:30 p.m. to 3 p.m.  How it works  Come to your visits every time. The treatment works better when you do.   You can have as many visits as you need. When you're better, we'll refer you back to being cared for by your family doctor.   If you need it, we'll send you to doctors, psychiatrists, therapists, and other providers. We focus on treating addiction. We don't treat other problems, like managing other medicines or non-addiction issues.  About visits  Urine drug testing  We'll often test your pee (urine) for drugs. This is the only way we can know for sure whether or not you're using drugs. It helps us treat you without judgement.   Suboxone (buprenorphine)  If you're taking buprenorphine, you'll have a lot of visits at first. If your problem is getting worse, or you're  "using substances, we may schedule you for extra visits.   Cancelling visits  If you can't come to your visit, please call us right away at [phone]. If you don't cancel at least 24 hours (1 full day) before your visit, that's \"late cancellation.\"  Being late to visits  If you come late, you may not be seen. This will count as a \"no-show.\"  Please call the clinic if you're running late. This will help us plan, but it doesn't mean you'll be seen.   Being late is:  More than 15 minutes late for a return visit.  More than 30 minutes late for your first visit.  If you cancel late or don't show up 2 times within 6 months, we may transfer you to another clinic.   Getting help between visits  If you need help between visits, you can call us Monday to Friday from 8 a.m. to 4:30 p.m. at [phone]. You can also send us a message on [employer].  Medicine refills  If you miss or cancel a visit, you can still ask for a refill. But we can only refill your medicines if you've made a new appointment.  Please call your pharmacy for medicine refills. If you have a question about your refill, call us at [phone].  It takes up to 2 business days to refill your drugs. Let us know 2 to 3 days before you run out. Don't call more than 1 week before you run out. That's too early.   Please make sure we have your right phone number.  If we have a problem with your refill, we'll call you. If we call you, please call us back right away. If you don't, you may not get your medicines quickly.   Call your pharmacy to find out if your medicines are ready.   Keep your medicines in a safe place. Keep them away from pets and children. If your medicines are lost or stolen, we usually don't replace them. We recommend you file a police report if your medicines are stolen. Your insurance may not pay for early refills, even if you have a prescription.  Forms  Please give us at least 3 business days to fill out any forms. Bring the forms to your " visits if you can. We may refer you to other members of your care team to complete the forms.   Emergency care   Call 911 or go to the nearest emergency room if your life or someone else's life is in danger.  Call 988 anytime for the Suicide and Crisis Lifeline.  If you need care when we're closed, call your family doctor to see if they can help. You can also go to urgent care or an emergency room. [facility] emergency rooms may be able to give you buprenorphine or other medicine refills.  Thank you for choosing us for your care.  For informational purposes only. Not to replace the advice of your health care provider. Copyright   2023 [facility]. All rights reserved. [employer] [id number] - REV 05/23.

## 2024-10-09 NOTE — NURSING NOTE
Current patient location:  pt at work    Is the patient currently in the state of [place]? YES    Visit mode:VIDEO    If the visit is dropped, the patient can be reconnected by: VIDEO VISIT: Text to cell phone:   Telephone Information:   Mobile [phone]       Will anyone else be joining the visit? NO  (If patient encounters technical issues they should call [phone] :[phone])    Are changes needed to the allergy or medication list? Yes pt not taking Naltrexone and Pt stated no changes to allergies    Are refills needed on medications prescribed by this physician? Discuss with provider    Rooming Documentation:  Questionnaire(s) completed    Reason for visit: RECHSHELLY SOLANO

## 2024-10-09 NOTE — PROGRESS NOTES
Virtual Visit Details    Type of service:  Video Visit     Joined the call at 10/9/2024, 12:25:19 pm.  Left the call at 10/9/2024, 12:49:20 pm.    Originating Location (pt. Location): Home    Distant Location (provider location):  Off-site  Platform used for Video Visit: [email or web address]

## 2024-10-09 NOTE — PROGRESS NOTES
[facility]    A/P                                                    ASSESSMENT/PLAN  1. Alcohol use disorder, severe, in early remission, dependence (H)  -controlled, no use, no cravings.  Last use March 13  Kayla would like to discontinue naltrexone due to frequent missed doses and once she resumes she experiences side effects.  She has completed IOP programming and followed all recommendations.She continues contact with her group members, and feels confident in her recovery. with maintained diligence it is reasonable to consider discontinuing and monitoring for returning of cravings.   -will continue to follow every two months     2. Recurrent major depressive disorder, in partial remission (H)  -controlled, no change   - buPROPion (WELLBUTRIN XL) 300 MG 24 hr tablet; Take 1 tablet (300 mg) by mouth every morning.  Dispense: 90 tablet; Refill: 0    3. Cannabis use disorder, severe, dependence (H)  -no change  -continue to ask, assess and advise     4. Attention deficit hyperactivity disorder (ADHD), unspecified ADHD type  - buPROPion (WELLBUTRIN XL) 300 MG 24 hr tablet; Take 1 tablet (300 mg) by mouth every morning.  Dispense: 90 tablet; Refill: 0      Oct 9, 2024  - discontinued naltrexone, will keep on med list in the event cravings resume.        Continued Complex Management  The longitudinal plan of care for Alcohol Use Disorder (AUD) was addressed during this visit. Due to the added complexity in care, I will continue to support Kayla in the subsequent management and with ongoing continuity of care.      Last encounter A/P  1. Alcohol use disorder, severe, dependence (H)  -controlled, in early remission  -continue with naltrexone  -encouraging starting AA meetings or involvement in Facebook sobriety groups early and often  - naltrexone (DEPADE/REVIA) 50 MG tablet; Take 1 tablet (50 mg) by mouth daily  Dispense: 90 tablet; Refill: 1  -continue with psychotherapy  -2 month  "follow up     2. Recurrent major depressive disorder, in partial remission (H24)  -improved \"I find myself smiling more\"    -continue with psychotherapy  - buPROPion (WELLBUTRIN XL) 300 MG 24 hr tablet; Take 1 tablet (300 mg) by mouth every morning  Dispense: 90 tablet; Refill: 0     3. Attention deficit hyperactivity disorder (ADHD), unspecified ADHD type  -no change   - buPROPion (WELLBUTRIN XL) 300 MG 24 hr tablet; Take 1 tablet (300 mg) by mouth every morning  Dispense: 90 tablet; Refill: 0     4. Cannabis use disorder, severe, dependence (H)  -moderate improvement.  Using less, has found that use has not been beneficial.  Considering discontinuing  -continue with psychotherapy         Aug 7, 2024  - naltrexone, buproprion         Continued Complex Management  The longitudinal plan of care for Alcohol Use Disorder (AUD) was addressed during this visit. Due to the added complexity in care, I will continue to support Kayla in the subsequent management and with ongoing continuity of care.      PDMP Review         Value Time User    State PDMP site checked  Yes 6/13/2024 10:05 AM Kaci Prather NP              RTC  Return in about 2 months (around 12/9/2024).      Counseled the patient on the importance of having a recovery program in addition to medication to manage recovery.  Components include avoiding isolating, having willingness to change, avoiding triggers and managing cravings. Encouraged having some type of sober network and practicing honesty with trusted support person(s). Encouraged other services such as counseling, 12 step or other self-help organizations.              SUBJECTIVE                                                    Lanie Martin is a 34 year old female who presents to clinic today for follow up    Visit performed Ivirtual video      MISBAH History:  Substance Use History:   \"Have you ever had any history with [...] use?\" And \"When was your last use?  ALCOHOL - March " "13  CANNABIS - daily  PRESCRIPTION STIMULANTS (includes Ritalin, Adderall, Vyvanse) - denies  COCAINE/CRACK - denies  METH/AMPHETAMINES (includes ecstacy, MDMA/alona) - denies  OPIATES - denies  BENZODIAZEPINES (includes Ativan, Klonopin, Xanax) - denies  KRATOM (mild opioid and stimulant effects) - denies  KETAMINE - denies  HALLUCINOGENS (includes DXM) - denies  BEHAVIORAL (Gambling, Eating d/o, Compulsivity) -   History of treatment -   NICOTINE  Cigarettes:   Chew/snus:   Vaping:   Past NRT/medication use:         Previous withdrawal treatment episodes (e.g. detox):   Previous MISBAH treatment programs: none  Hospitalizations or overdose: denies  Medical complications from substance use: denies  IV Drug use?: denies  Previous Medication for Addiction Tx: naltrexone  Longest period of full abstinence:   Activities that have previously supported abstinence:   Current Recovery Activities:     Recent HPI Details:  HPI Aug 7, 2024  - \"less crazy stuff\"  Neighbor is watching kids until Jan.    Graduated IOP, on a lot of facebook sobriety groups.  Online open chat.  Hasn't gone to AA, but has checked out available  meetings.  Kids get in the way of attendance. [employer] contest in October.  Considering going to .  Feels able to ask for non alcoholic. \"No drinking, good and smooth.\"  Sobriety March 13, 5 months, \"I can't believe it's been five months\" using less cannabis.  Working with therapist on goal of discontinuing.   has been abstinent for one year.     TODAY'S VISIT  HPI Oct 9, 2024  - Oldest struggled with starting .  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 No Cravings, no concerns did not use.    Having to decrease hours at work due to  needs.    Stopped naltrexone 1 week ago.    Sober since March 13.   No current recovery activities, facebook groups, kids busy schedule. Maintaining connection with [place].        OBJECTIVE  PHYSICAL EXAM:  There were no " vitals taken for this visit.    GENERAL: healthy, alert and no distress  EYES: Eyes grossly normal to inspection, PERRL and conjunctivae and sclerae normal  RESP: No respiratory distress  MENTAL STATUS EXAM  Appearance/Behavior: No appearant distress  Speech: Normal  Mood/Affect: normal affect  Insight: Adequate      PHQ-9 Score:       8/7/2024    12:18 PM 9/9/2024     2:46 PM 10/9/2024    12:16 PM   PHQ   PHQ-9 Total Score 2 2 3   Q9: Thoughts of better off dead/self-harm past 2 weeks Not at all Not at all Not at all       TRINITY-7 Score:      12/20/2023     3:25 PM 1/24/2024     8:15 AM 7/22/2024    10:49 AM   TRINITY-7 SCORE   Total Score 5 (mild anxiety)  8 (mild anxiety)   Total Score 5 4 8       LABS (may not contain today's labs)                                                      Today's lab data  No results found for any visits on 10/09/24.        HISTORY                                                    Problem list reviewed & adjusted, as indicated.  Patient Active Problem List   Diagnosis    Other depression    Family history of hypertrophic cardiomyopathy    Recurrent major depressive disorder, in partial remission (H)    Cannabis use disorder, severe, dependence (H)    Alcohol use disorder, severe, in early remission, dependence (H)    Generalized anxiety disorder         MEDICATION LIST (prior to visit)  Current Outpatient Medications   Medication Sig Dispense Refill    buPROPion (WELLBUTRIN XL) 300 MG 24 hr tablet Take 1 tablet (300 mg) by mouth every morning 90 tablet 0    escitalopram (LEXAPRO) 20 MG tablet Take 1 tablet (20 mg) by mouth daily 90 tablet 3    naltrexone (DEPADE/REVIA) 50 MG tablet Take 1 tablet (50 mg) by mouth daily 90 tablet 1     No current facility-administered medications for this visit.       MEDICATION LIST (after visit)  Current Outpatient Medications   Medication Sig Dispense Refill    buPROPion (WELLBUTRIN XL) 300 MG 24 hr tablet Take 1 tablet (300 mg) by mouth every morning 90  tablet 0    escitalopram (LEXAPRO) 20 MG tablet Take 1 tablet (20 mg) by mouth daily 90 tablet 3    naltrexone (DEPADE/REVIA) 50 MG tablet Take 1 tablet (50 mg) by mouth daily 90 tablet 1     No current facility-administered medications for this visit.         Allergies   Allergen Reactions    Tylenol W/Codeine [Acetaminophen-Codeine] Other (See Comments)     Causes lower back pain       Kaci Prather NP  [facility]  [phone]    Answers submitted by the patient for this visit:  Patient Health Questionnaire (Submitted on 10/9/2024)  If you checked off any problems, how difficult have these problems made it for you to do your work, take care of things at home, or get along with other people?: Not difficult at all  PHQ9 TOTAL SCORE: 3

## 2024-10-21 ASSESSMENT — PATIENT HEALTH QUESTIONNAIRE - PHQ9
10. IF YOU CHECKED OFF ANY PROBLEMS, HOW DIFFICULT HAVE THESE PROBLEMS MADE IT FOR YOU TO DO YOUR WORK, TAKE CARE OF THINGS AT HOME, OR GET ALONG WITH OTHER PEOPLE: SOMEWHAT DIFFICULT
SUM OF ALL RESPONSES TO PHQ QUESTIONS 1-9: 9
SUM OF ALL RESPONSES TO PHQ QUESTIONS 1-9: 9

## 2024-10-22 ENCOUNTER — VIRTUAL VISIT (OUTPATIENT)
Dept: BEHAVIORAL HEALTH | Facility: CLINIC | Age: 34
End: 2024-10-22
Payer: COMMERCIAL

## 2024-10-22 DIAGNOSIS — F33.41 RECURRENT MAJOR DEPRESSIVE DISORDER, IN PARTIAL REMISSION (H): ICD-10-CM

## 2024-10-22 DIAGNOSIS — F41.1 GENERALIZED ANXIETY DISORDER: ICD-10-CM

## 2024-10-22 PROCEDURE — 90834 PSYTX W PT 45 MINUTES: CPT | Mod: 95

## 2024-10-22 NOTE — PROGRESS NOTES
[facility]                                     Progress Note    Patient Name: Lanie Martin  Date: 10/22/24           Service Type: Individual      Session Start Time: 1.58  Session End Time: 2.37     Session Length: 38-52 minutes     Session #: 7    Attendees: Client attended alone    Service Modality:  Video Visit:      Provider verified identity through the following two step process.  Patient provided:  Patient  and Patient address    Telemedicine Visit: The patient's condition can be safely assessed and treated via synchronous audio and visual telemedicine encounter.      Reason for Telemedicine Visit: Patient has requested telehealth visit    Originating Site (Patient Location): Patient's place of employment    Distant Site (Provider Location): [facility]    Consent:  The patient/guardian has verbally consented to: the potential risks and benefits of telemedicine (video visit) versus in person care; bill my insurance or make self-payment for services provided; and responsibility for payment of non-covered services.     Patient would like the video invitation sent by:  My Chart    Mode of Communication:  Video Conference via [employer]well    Distant Location (Provider):  Off-site    As the provider I attest to compliance with applicable laws and regulations related to telemedicine.    DATA  Interactive Complexity: No  Crisis: No         Progress Since Last Session (Related to Symptoms / Goals / Homework):   Symptoms: Worsening . Patient's GAD7 and PHQ9 scores have increased.    Homework: Partially completed      Episode of Care Goals: Satisfactory progress - CONTEMPLATION (Considering change and yet undecided); Intervened by assessing the negative and positive thinking (ambivalence) about behavior change     Current / Ongoing Stressors and Concerns:  Patient discussed the workplace culture at her job. Patient discussed workplace stressors  "and looking for other employment opportunities. Patient discussed behavioral struggles with her sons. Patient discussed preparing for deer hunting season starting in November. Patient discussed challenging relationship dynamics with her , Bernabe. Patient discussed intimacy dynamics. Patient discussed thoughts about divorce. Patient discussed stopping her Naltrexone medication and not noticing any alcohol cravings. Patient discussed a lack of \"me time\".        Treatment Objective(s) Addressed in This Session:   Increase interest, engagement, and pleasure in doing things  Decrease frequency and intensity of feeling anxious, nervous, worried       Intervention:   Motivational Interviewing    MI Intervention: Expressed Empathy/Understanding, Supported Autonomy, Collaboration, Evocation, Open-ended questions, and Reflections: simple and complex     Change Talk Expressed by the Patient: Desire to change    Provider Response to Change Talk: E - Evoked more info from patient about behavior change, A - Affirmed patient's thoughts, decisions, or attempts at behavior change, and R - Reflected patient's change talk    Assessments completed prior to visit:  The following assessments were completed by patient for this visit:  PHQ9:       5/10/2024     8:35 AM 6/13/2024     8:46 AM 7/25/2024    11:29 AM 8/7/2024    12:18 PM 9/9/2024     2:46 PM 10/9/2024    12:16 PM 10/21/2024     7:33 PM   PHQ-9 SCORE   PHQ-9 Total Score MyChart 4 (Minimal depression) 5 (Mild depression) 4 (Minimal depression) 2 (Minimal depression) 2 (Minimal depression) 3 (Minimal depression) 9 (Mild depression)   PHQ-9 Total Score 4 5 4 2 2 3 9     GAD7:       3/19/2019     2:54 PM 3/5/2021    11:14 AM 5/10/2022     7:54 AM 8/28/2022     1:11 PM 12/20/2023     3:25 PM 1/24/2024     8:15 AM 7/22/2024    10:49 AM   TRINITY-7 SCORE   Total Score    4 (minimal anxiety) 5 (mild anxiety)  8 (mild anxiety)   Total Score 0 2 3 4 5 4 8     PROMIS 10-Global Health " (all questions and answers displayed):       2/1/2024    11:03 AM 2/14/2024     1:01 PM 2/23/2024     1:46 PM 6/10/2024    11:56 AM 7/18/2024     8:01 AM 10/21/2024     7:34 PM   PROMIS 10   In general, would you say your health is:     Good Fair   In general, would you say your quality of life is:     Fair Fair   In general, how would you rate your physical health?     Good Fair   In general, how would you rate your mental health, including your mood and your ability to think?     Poor Fair   In general, how would you rate your satisfaction with your social activities and relationships?     Good Poor   In general, please rate how well you carry out your usual social activities and roles     Fair Poor   To what extent are you able to carry out your everyday physical activities such as walking, climbing stairs, carrying groceries, or moving a chair?     Completely Completely   In the past 7 days, how often have you been bothered by emotional problems such as feeling anxious, depressed, or irritable?     Sometimes Sometimes   In the past 7 days, how would you rate your fatigue on average?     Mild Mild   In the past 7 days, how would you rate your pain on average, where 0 means no pain, and 10 means worst imaginable pain?     0 0   In general, would you say your health is:     3 2   In general, would you say your quality of life is:     2 2   In general, how would you rate your physical health?     3 2   In general, how would you rate your mental health, including your mood and your ability to think?     1 2   In general, how would you rate your satisfaction with your social activities and relationships?     3 1   In general, please rate how well you carry out your usual social activities and roles. (This includes activities at home, at work and in your community, and responsibilities as a parent, child, spouse, employee, friend, etc.)     2 1   To what extent are you able to carry out your everyday physical  "activities such as walking, climbing stairs, carrying groceries, or moving a chair?     5 5   In the past 7 days, how often have you been bothered by emotional problems such as feeling anxious, depressed, or irritable?     3 3   In the past 7 days, how would you rate your fatigue on average?     2 2   In the past 7 days, how would you rate your pain on average, where 0 means no pain, and 10 means worst imaginable pain?     0 0   Global Mental Health Score     9 8   Global Physical Health Score     17 16   PROMIS TOTAL - SUBSCORES     26 24       Information is confidential and restricted. Go to Review Flowsheets to unlock data.     [place] Suicide Severity Rating Scale (Lifetime/Recent)      2/3/2019     3:50 AM 1/20/2021     7:53 PM 2/20/2024    10:00 AM 7/25/2024    11:54 AM   [place] Suicide Severity Rating (Lifetime/Recent)   Q1 Wished to be Dead (Past Month) no no     Q2 Suicidal Thoughts (Past Month) no no     Q3 Suicidal Thought Method  no     Q4 Suicidal Intent without Specific Plan  no     Q5 Suicide Intent with Specific Plan  no     Q6 Suicide Behavior (Lifetime) no no     Q1 Wish to be Dead (Lifetime)   Y N   Wish to be Dead Description (Lifetime)   The patient denied having any history of suicide attempts.    1. Wish to be Dead (Past 1 Month)   Y    Wish to be Dead Description (Past 1 Month)   The patient reported she had ONLY had passive suicide ideation, with no plan or intent to harm herself.  The thoughts had been along the lines of \"thongs would be easier if she were not around\".    Q2 Non-Specific Active Suicidal Thoughts (Lifetime)   Y N   Non-Specific Active Suicidal Thought Description (Lifetime)   See above    2. Non-Specific Active Suicidal Thoughts (Past 1 Month)   Y    Non-Specific Active Suicidal Thought Description (Past 1 Month)   See above    3. Active Suicidal Ideation with any Methods (Not Plan) Without Intent to Act (Lifetime)   N    Active Suicidal Ideation with any Methods (Not " Plan) Description (Lifetime)   NA    Q3 Active Suicidal Ideation with any Methods (Not Plan) Without Intent to Act (Past 1 Month)   N    Q4 Active Suicidal Ideation with Some Intent to Act, Without Specific Plan (Lifetime)   N    Active Suicidal Ideation with Some Intent to Act, Without Specific Plan Description (Lifetime)   NA    4. Active Suicidal Ideation with Some Intent to Act, Without Specific Plan (Past 1 Month)   N    Q5 Active Suicidal Ideation with Specific Plan and Intent (Lifetime)   N    Active Suicidal Ideation with Specific Plan and Intent Description (Lifetime)   NA    5. Active Suicidal Ideation with Specific Plan and Intent (Past 1 Month)   N    Most Severe Ideation Rating (Lifetime)   3    Description of Most Severe Ideation (Lifetime)   See abobe    Most Severe Ideation Rating (Past 1 Month)   2    Description of Most Severe Ideation (Past 1 Month)   See above    Frequency (Lifetime)   1    Frequency (Past 1 Month)   1    Duration (Lifetime)   3    Duration (Past 1 Month)   3    Controllability (Lifetime)   3    Controllability (Past 1 Month)   3    Deterrents (Lifetime)   1    Deterrents (Past 1 Month)   1    Reasons for Ideation (Lifetime)   5    Reasons for Ideation (Past 1 Month)   5    Actual Attempt (Lifetime)   N    Has subject engaged in non-suicidal self-injurious behavior? (Lifetime)   N    Interrupted Attempts (Lifetime)   N    Aborted or Self-Interrupted Attempt (Lifetime)   N    Preparatory Acts or Behavior (Lifetime)   N    Calculated C-SSRS Risk Score (Lifetime/Recent)   Low Risk          ASSESSMENT: Current Emotional / Mental Status (status of significant symptoms):   Risk status (Self / Other harm or suicidal ideation)   Patient denies current fears or concerns for personal safety.   Patient reports the following current or recent suicidal ideation or behaviors: patient reported passive thoughts of self-harm, but denies any SI or plan or intent. Patient will call 911 or go to the  local ED if ever feeling unsafe .   Patient denies current or recent homicidal ideation or behaviors.   Patient denies current or recent self injurious behavior or ideation.   Patient denies other safety concerns.   Patient reports there has been no change in risk factors since their last session.     Patient reports there has been no change in protective factors since their last session.     Recommended that patient call 911 or go to the local ED should there be a change in any of these risk factors     Appearance:   Appropriate    Eye Contact:   Good    Psychomotor Behavior: Normal    Attitude:   Cooperative  Friendly   Orientation:   All   Speech    Rate / Production: Normal/ Responsive Talkative    Volume:  Normal    Mood:    Anxious  Irritable    Affect:    Appropriate    Thought Content:  Clear    Thought Form:  Coherent  Logical    Insight:    Fair      Medication Review:   No changes to current psychiatric medication(s)     Medication Compliance:   Yes     Changes in Health Issues:   None reported     Chemical Use Review:   Substance Use: Chemical use reviewed, no active concerns identified .  However, patient's daily cannabis use will continue to be evaluated for Cannabis Use Disorder. Patient reported successfully completing chemical dependency treatment at [facility] and has reportedly been sober from alcohol use since February 2024.      Tobacco Use: No current tobacco use.      Diagnosis:  1. Generalized anxiety disorder    2. Recurrent major depressive disorder, in partial remission (H)    Other Diagnoses that is relevant to services: Alcohol Use Disorder, currently in partial remission   Provisional Diagnoses in the process of being ruled-out: ADHD & PTSD      Collateral Reports Completed:   Not Applicable    PLAN: (Patient Tasks / Therapist Tasks / Other)  Patient will return in 2 weeks for next session. Patient will engage in self-care activities. Patient will work on mindfulness skills to  increase awareness of triggers to anxiety, irritability, and depression. Patient will work on open and direct communication skills with her . Patient will work on setting clear parameters, boundaries, and expectations with her spouse. Patient will work on mindfulness skills of her hunger cues and tracking whether she is consuming enough daily calories. Patient will consider increasing to 3 consistent meals throughout the day.      Melanie Lei, [facility]                                                         ______________________________________________________________________    Individual Treatment Plan    Patient's Name: Lanie Martin  YOB: 1990    Date of Creation: 8/1/24  Date Treatment Plan Last Reviewed/Revised: 8/1/24    DSM5 Diagnoses:   Encounter Diagnoses   Name Primary?    Generalized anxiety disorder     Recurrent major depressive disorder, in partial remission (H)        Psychosocial / Contextual Factors: Substance use, relationship stressors, occupational dynamics.  PROMIS (reviewed every 90 days):   PROMIS-10 from encounters over the past 365 days     10/22/24 7/25/24   Global Mental Health Score (P) 8 (P) 9   Global Physical Health Score (P) 16 (P) 17   PROMIS TOTAL - SUBSCORES (P) 24 (P) 26       Referral / Collaboration:  Referral to another professional/service is not indicated at this time..    Anticipated number of session for this episode of care: 9-12 sessions  Anticipation frequency of session: Biweekly  Anticipated Duration of each session: 38-52 minutes  Treatment plan will be reviewed in 90 days or when goals have been changed.       MeasurableTreatment Goal(s) related to diagnosis / functional impairment(s)  Goal 1: Patient will reduce depression symptoms as evidenced by a reduction in their PHQ9 score to a 2 or less in the next 2 months.     I will know I've met my goal when I'm smiling more.      Objective #A (Patient Action)    Patient will Decrease frequency  and intensity of feeling down, depressed, hopeless.  Status: New - Date: 8/1/24      Intervention(s)  Therapist will teach  distress tolerance and coping skills .    Objective #B  Patient will Increase interest, engagement, and pleasure in doing things.  Status: New - Date: 8/1/24     Intervention(s)  Therapist will  teach self-care skills .    Objective #C  Patient will Improve diet, appetite, mindful eating, and / or meal planning.  Status: New - Date: 8/1/24     Intervention(s)  Therapist will  provide psycho-education on the impact and effects of nutrition on mental health.  .        Goal 2: Patient will reduce anxiety symptoms as evidenced by a reduction in their GAD7 score to a 4 or less in the next 2 months.     I will know I've met my goal when I don't panic when someone walks in for their shift & I'm not stressing out when my  sighs.      Objective #A (Patient Action)    Status: New - Date: 8/1/24     Patient will identify at least 2 triggers for anxiety.    Intervention(s)  Therapist will  teach mindfulness skills .    Objective #B  Patient will use thought-stopping strategy daily to reduce intrusive thoughts.    Status: New - Date: 8/1/24     Intervention(s)  Therapist will  teach thought-stopping and thought-challenging techniques .            Patient has reviewed and agreed to the above plan.      Melanie Lei, [facility]  August 1, 2024

## 2024-11-04 ENCOUNTER — OFFICE VISIT (OUTPATIENT)
Dept: FAMILY MEDICINE | Facility: CLINIC | Age: 34
End: 2024-11-04
Payer: COMMERCIAL

## 2024-11-04 VITALS
HEART RATE: 98 BPM | BODY MASS INDEX: 21.79 KG/M2 | OXYGEN SATURATION: 99 % | TEMPERATURE: 98.4 F | HEIGHT: 63 IN | WEIGHT: 123 LBS | DIASTOLIC BLOOD PRESSURE: 60 MMHG | SYSTOLIC BLOOD PRESSURE: 120 MMHG | RESPIRATION RATE: 16 BRPM

## 2024-11-04 DIAGNOSIS — Z00.00 ROUTINE GENERAL MEDICAL EXAMINATION AT A HEALTH CARE FACILITY: Primary | ICD-10-CM

## 2024-11-04 DIAGNOSIS — L03.012 CELLULITIS OF LEFT FINGER: ICD-10-CM

## 2024-11-04 DIAGNOSIS — F33.41 RECURRENT MAJOR DEPRESSIVE DISORDER, IN PARTIAL REMISSION (H): ICD-10-CM

## 2024-11-04 DIAGNOSIS — Z23 NEED FOR PNEUMOCOCCAL VACCINE: ICD-10-CM

## 2024-11-04 DIAGNOSIS — Z13.220 LIPID SCREENING: ICD-10-CM

## 2024-11-04 DIAGNOSIS — Z11.59 NEED FOR HEPATITIS C SCREENING TEST: ICD-10-CM

## 2024-11-04 DIAGNOSIS — Z23 NEED FOR HEPATITIS B VACCINATION: ICD-10-CM

## 2024-11-04 LAB
CHOLEST SERPL-MCNC: 195 MG/DL
FASTING STATUS PATIENT QL REPORTED: NO
HCV AB SERPL QL IA: NONREACTIVE
HDLC SERPL-MCNC: 48 MG/DL
LDLC SERPL CALC-MCNC: 120 MG/DL
NONHDLC SERPL-MCNC: 147 MG/DL
TRIGL SERPL-MCNC: 136 MG/DL

## 2024-11-04 PROCEDURE — 36415 COLL VENOUS BLD VENIPUNCTURE: CPT

## 2024-11-04 PROCEDURE — 90472 IMMUNIZATION ADMIN EACH ADD: CPT

## 2024-11-04 PROCEDURE — 86803 HEPATITIS C AB TEST: CPT

## 2024-11-04 PROCEDURE — 80061 LIPID PANEL: CPT

## 2024-11-04 PROCEDURE — 99213 OFFICE O/P EST LOW 20 MIN: CPT | Mod: 25

## 2024-11-04 PROCEDURE — 90471 IMMUNIZATION ADMIN: CPT

## 2024-11-04 PROCEDURE — 99395 PREV VISIT EST AGE 18-39: CPT | Mod: 25

## 2024-11-04 PROCEDURE — 90746 HEPB VACCINE 3 DOSE ADULT IM: CPT

## 2024-11-04 PROCEDURE — 90677 PCV20 VACCINE IM: CPT

## 2024-11-04 PROCEDURE — 96127 BRIEF EMOTIONAL/BEHAV ASSMT: CPT

## 2024-11-04 RX ORDER — ESCITALOPRAM OXALATE 20 MG/1
20 TABLET ORAL DAILY
Qty: 90 TABLET | Refills: 3 | Status: SHIPPED | OUTPATIENT
Start: 2024-11-04

## 2024-11-04 RX ORDER — CEPHALEXIN 500 MG/1
500 CAPSULE ORAL 2 TIMES DAILY
Status: CANCELLED | OUTPATIENT
Start: 2024-11-04

## 2024-11-04 RX ORDER — BUPROPION HYDROCHLORIDE 300 MG/1
300 TABLET ORAL EVERY MORNING
Qty: 90 TABLET | Refills: 3 | Status: SHIPPED | OUTPATIENT
Start: 2024-11-04

## 2024-11-04 SDOH — HEALTH STABILITY: PHYSICAL HEALTH: ON AVERAGE, HOW MANY DAYS PER WEEK DO YOU ENGAGE IN MODERATE TO STRENUOUS EXERCISE (LIKE A BRISK WALK)?: 2 DAYS

## 2024-11-04 ASSESSMENT — ANXIETY QUESTIONNAIRES
8. IF YOU CHECKED OFF ANY PROBLEMS, HOW DIFFICULT HAVE THESE MADE IT FOR YOU TO DO YOUR WORK, TAKE CARE OF THINGS AT HOME, OR GET ALONG WITH OTHER PEOPLE?: SOMEWHAT DIFFICULT
2. NOT BEING ABLE TO STOP OR CONTROL WORRYING: NOT AT ALL
6. BECOMING EASILY ANNOYED OR IRRITABLE: SEVERAL DAYS
3. WORRYING TOO MUCH ABOUT DIFFERENT THINGS: SEVERAL DAYS
GAD7 TOTAL SCORE: 4
IF YOU CHECKED OFF ANY PROBLEMS ON THIS QUESTIONNAIRE, HOW DIFFICULT HAVE THESE PROBLEMS MADE IT FOR YOU TO DO YOUR WORK, TAKE CARE OF THINGS AT HOME, OR GET ALONG WITH OTHER PEOPLE: SOMEWHAT DIFFICULT
1. FEELING NERVOUS, ANXIOUS, OR ON EDGE: NOT AT ALL
7. FEELING AFRAID AS IF SOMETHING AWFUL MIGHT HAPPEN: NOT AT ALL
GAD7 TOTAL SCORE: 4
GAD7 TOTAL SCORE: 4
4. TROUBLE RELAXING: NOT AT ALL
5. BEING SO RESTLESS THAT IT IS HARD TO SIT STILL: MORE THAN HALF THE DAYS
7. FEELING AFRAID AS IF SOMETHING AWFUL MIGHT HAPPEN: NOT AT ALL

## 2024-11-04 ASSESSMENT — PAIN SCALES - GENERAL: PAINLEVEL_OUTOF10: NO PAIN (0)

## 2024-11-04 ASSESSMENT — PATIENT HEALTH QUESTIONNAIRE - PHQ9
10. IF YOU CHECKED OFF ANY PROBLEMS, HOW DIFFICULT HAVE THESE PROBLEMS MADE IT FOR YOU TO DO YOUR WORK, TAKE CARE OF THINGS AT HOME, OR GET ALONG WITH OTHER PEOPLE: SOMEWHAT DIFFICULT
10. IF YOU CHECKED OFF ANY PROBLEMS, HOW DIFFICULT HAVE THESE PROBLEMS MADE IT FOR YOU TO DO YOUR WORK, TAKE CARE OF THINGS AT HOME, OR GET ALONG WITH OTHER PEOPLE: SOMEWHAT DIFFICULT
SUM OF ALL RESPONSES TO PHQ QUESTIONS 1-9: 3

## 2024-11-04 ASSESSMENT — SOCIAL DETERMINANTS OF HEALTH (SDOH): HOW OFTEN DO YOU GET TOGETHER WITH FRIENDS OR RELATIVES?: PATIENT DECLINED

## 2024-11-04 NOTE — PATIENT INSTRUCTIONS
Patient Education   Preventive Care Advice   This is general advice given by our system to help you stay healthy. However, your care team may have specific advice just for you. Please talk to your care team about your preventive care needs.  Nutrition  Eat 5 or more servings of fruits and vegetables each day.  Try wheat bread, brown rice and whole grain pasta (instead of white bread, rice, and pasta).  Get enough calcium and vitamin D. Check the label on foods and aim for 100% of the RDA (recommended daily allowance).  Lifestyle  Exercise at least 150 minutes each week  (30 minutes a day, 5 days a week).  Do muscle strengthening activities 2 days a week. These help control your weight and prevent disease.  No smoking.  Wear sunscreen to prevent skin cancer.  Have a dental exam and cleaning every 6 months.  Yearly exams  See your health care team every year to talk about:  Any changes in your health.  Any medicines your care team has prescribed.  Preventive care, family planning, and ways to prevent chronic diseases.  Shots (vaccines)   HPV shots (up to age 26), if you've never had them before.  Hepatitis B shots (up to age 59), if you've never had them before.  COVID-19 shot: Get this shot when it's due.  Flu shot: Get a flu shot every year.  Tetanus shot: Get a tetanus shot every 10 years.  Pneumococcal, hepatitis A, and RSV shots: Ask your care team if you need these based on your risk.  Shingles shot (for age 50 and up)  General health tests  Diabetes screening:  Starting at age 35, Get screened for diabetes at least every 3 years.  If you are younger than age 35, ask your care team if you should be screened for diabetes.  Cholesterol test: At age 39, start having a cholesterol test every 5 years, or more often if advised.  Bone density scan (DEXA): At age 50, ask your care team if you should have this scan for osteoporosis (brittle bones).  Hepatitis C: Get tested at least once in your life.  STIs (sexually  transmitted infections)  Before age 24: Ask your care team if you should be screened for STIs.  After age 24: Get screened for STIs if you're at risk. You are at risk for STIs (including HIV) if:  You are sexually active with more than one person.  You don't use condoms every time.  You or a partner was diagnosed with a sexually transmitted infection.  If you are at risk for HIV, ask about PrEP medicine to prevent HIV.  Get tested for HIV at least once in your life, whether you are at risk for HIV or not.  Cancer screening tests  Cervical cancer screening: If you have a cervix, begin getting regular cervical cancer screening tests starting at age 21.  Breast cancer scan (mammogram): If you've ever had breasts, begin having regular mammograms starting at age 40. This is a scan to check for breast cancer.  Colon cancer screening: It is important to start screening for colon cancer at age 45.  Have a colonoscopy test every 10 years (or more often if you're at risk) Or, ask your provider about stool tests like a FIT test every year or Cologuard test every 3 years.  To learn more about your testing options, visit:   .  For help making a decision, visit:   https://bit.ly/qx56009.  Prostate cancer screening test: If you have a prostate, ask your care team if a prostate cancer screening test (PSA) at age 55 is right for you.  Lung cancer screening: If you are a current or former smoker ages 50 to 80, ask your care team if ongoing lung cancer screenings are right for you.  For informational purposes only. Not to replace the advice of your health care provider. Copyright   2023 [employer]. All rights reserved. Clinically reviewed by the [facility] Transitions Program. echoecho [id number] - REV 01/24.

## 2024-11-04 NOTE — NURSING NOTE
Prior to immunization administration, verified patients identity using patient s name and date of birth. Please see Immunization Activity for additional information.     Screening Questionnaire for Adult Immunization    Are you sick today?   No   Do you have allergies to medications, food, a vaccine component or latex?   No   Have you ever had a serious reaction after receiving a vaccination?   No   Do you have a long-term health problem with heart, lung, kidney, or metabolic disease (e.g., diabetes), asthma, a blood disorder, no spleen, complement component deficiency, a cochlear implant, or a spinal fluid leak?  Are you on long-term aspirin therapy?   No   Do you have cancer, leukemia, HIV/AIDS, or any other immune system problem?   No   Do you have a parent, brother, or sister with an immune system problem?   No   In the past 3 months, have you taken medications that affect  your immune system, such as prednisone, other steroids, or anticancer drugs; drugs for the treatment of rheumatoid arthritis, Crohn s disease, or psoriasis; or have you had radiation treatments?   No   Have you had a seizure, or a brain or other nervous system problem?   No   During the past year, have you received a transfusion of blood or blood    products, or been given immune (gamma) globulin or antiviral drug?   No   For women: Are you pregnant or is there a chance you could become       pregnant during the next month?   No   Have you received any vaccinations in the past 4 weeks?   No     Immunization questionnaire answers were all negative.      Patient instructed to remain in clinic for 15 minutes afterwards, and to report any adverse reactions.     Screening performed by Yany Schwartz MA on 11/4/2024 at 10:04 AM.

## 2024-11-04 NOTE — PROGRESS NOTES
Preventive Care Visit  [facility]  GAMALIEL Yen CNP, Family Medicine  Nov 4, 2024      Assessment & Plan     Routine general medical examination at a health care facility  Kayla is a 34 yr old here for physical exam. Discussed preventative screenings which are updated below. Counseled on immunizations and healthy lifestyle. Follow-up in 1 year for repeat physical exam.   Lab work is in process    Cellulitis of left finger  Due to cut from knife used for taxidermy on deer head. Return to clinic if symptoms worsen or do not improve. Discussed medication use and side effects. See handout.   - amoxicillin-clavulanate (AUGMENTIN) 875-125 MG tablet  Dispense: 10 tablet; Refill: 0    Recurrent major depressive disorder, in partial remission (H)  Stable. Following with behavioral health counselor. Continue current medication treatment.   - escitalopram (LEXAPRO) 20 MG tablet  Dispense: 90 tablet; Refill: 3  - buPROPion (WELLBUTRIN XL) 300 MG 24 hr tablet  Dispense: 90 tablet; Refill: 3    Need for hepatitis C screening test  - Hepatitis C Screen Reflex to HCV RNA Quant and Genotype    Need for pneumococcal vaccine  - Pneumococcal 20 Valent Conjugate (Prevnar 20)    Need for hepatitis B vaccination  - HEPATITIS B, ADULT 20+ (ENGERIX-B/RECOMBIVAX HB)    Lipid screening  - Lipid panel      Patient verbalizes understanding and is in agreement with the plan of care. All questions and concerns addressed.       Patient has been advised of split billing requirements and indicates understanding: Yes        Counseling  Appropriate preventive services were addressed with this patient via screening, questionnaire, or discussion as appropriate for fall prevention, nutrition, physical activity.   Checklist reviewing preventive services available has been given to the patient.  Reviewed patient's diet, addressing concerns and/or questions.   She is at risk for lack of exercise and has been provided with  information to increase physical activity for the benefit of her well-being.       FUTURE APPOINTMENTS:  Follow up as needed if symptoms worsen or do not improve.        - Follow-up for annual visit or as needed    Subjective   Kayla is a 34 year old, presenting for the following:  Physical and finger wound (Left Index finger injured on 11/2/2024.  Needs a wound check.)        11/4/2024     9:35 AM   Additional Questions   Roomed by Yany GREEN   Accompanied by none         11/4/2024     9:35 AM   Patient Reported Additional Medications   Patient reports taking the following new medications none          HPI  Left index finger injury: On 11/22/24 Kayla was working on [employer] on deer skull. The knife slipped and nicked middle left index finger. She cleansed the area. Today when she woke up there was swelling and erythema surrounding the area. Unable to bend at the PIP joint. Increased pain. She outlined the area of redness, has spread slightly down the proximal phalanx. Denies any fevers, chills, body aches, drainage from site, circulation to finger intact.     Depression and Anxiety   How are you doing with your depression since your last visit? No change  How are you doing with your anxiety since your last visit?  No change  Are you having other symptoms that might be associated with depression or anxiety? No  Have you had a significant life event? OTHER: Quit drinking in March 2024    Do you have any concerns with your use of alcohol or other drugs? No    Feeling better with lexapro and wellbutrin. Tolerating with no side effects.   Completed rehab program and treatment for depression anxiety and alcohol use. Following with counselor.   Noise for alcohol cravings has decreased. She has been sober since 3/13/2024  Social History     Tobacco Use    Smoking status: Former     Current packs/day: 0.00     Average packs/day: 1 pack/day for 7.0 years (7.0 ttl pk-yrs)     Types: Cigarettes     Start date: 2005     Quit  date:      Years since quittin.8    Smokeless tobacco: Never    Tobacco comments:     quit 5-6 year ago   Vaping Use    Vaping status: Never Used   Substance Use Topics    Alcohol use: Not Currently     Comment: sober since 3/13/2024    Drug use: Yes     Types: Marijuana     Comment: occasionally         10/9/2024    12:16 PM 10/21/2024     7:33 PM 2024     9:31 AM   PHQ   PHQ-9 Total Score 3 9 3    Q9: Thoughts of better off dead/self-harm past 2 weeks Not at all  Several days  Not at all    F/U: Thoughts of suicide or self-harm  No     F/U: Safety concerns  No         Patient-reported         2024     8:15 AM 2024    10:49 AM 2024     9:31 AM   TRINITY-7 SCORE   Total Score  8 (mild anxiety) 4 (minimal anxiety)   Total Score 4 8 4        Patient-reported     Health Care Directive  Patient does not have a Health Care Directive: Discussed advance care planning with patient; however, patient declined at this time.      2024   General Health   How would you rate your overall physical health? Good   Feel stress (tense, anxious, or unable to sleep) Not at all            2024   Nutrition   Three or more servings of calcium each day? Yes   Diet: Regular (no restrictions)   How many servings of fruit and vegetables per day? (!) 0-1   How many sweetened beverages each day? (!) 2            2024   Exercise   Days per week of moderate/strenous exercise 2 days      (!) EXERCISE CONCERN      2024   Social Factors   Frequency of gathering with friends or relatives Patient declined   Worry food won't last until get money to buy more No   Food not last or not have enough money for food? No   Do you have housing? (Housing is defined as stable permanent housing and does not include staying ouside in a car, in a tent, in an abandoned building, in an overnight shelter, or couch-surfing.) Yes   Are you worried about losing your housing? No   Lack of transportation? No   Unable to get  utilities (heat,electricity)? No            2024   Dental   Dentist two times every year? Yes            2024   TB Screening   Were you born outside of the [place]? No          Today's PHQ-9 Score:       2024     9:31 AM   PHQ-9 SCORE   PHQ-9 Total Score MyChart 3 (Minimal depression)   PHQ-9 Total Score 3        Patient-reported         2024   Substance Use   Alcohol more than 3/day or more than 7/wk No   Do you use any other substances recreationally? No        Social History     Tobacco Use    Smoking status: Former     Current packs/day: 0.00     Average packs/day: 1 pack/day for 7.0 years (7.0 ttl pk-yrs)     Types: Cigarettes     Start date:      Quit date:      Years since quittin.8    Smokeless tobacco: Never    Tobacco comments:     quit 5-6 year ago   Vaping Use    Vaping status: Never Used   Substance Use Topics    Alcohol use: Not Currently     Comment: sober since 3/13/2024    Drug use: Yes     Types: Marijuana     Comment: occasionally          Mammogram Screening - Patient under 40 years of age: Routine Mammogram Screening not recommended.         2024   STI Screening   New sexual partner(s) since last STI/HIV test? No        History of abnormal Pap smear: No - age 30-64 HPV with reflex Pap every 5 years recommended        Latest Ref Rng & Units 3/5/2021    11:50 AM 3/5/2021    10:58 AM 7/3/2018     1:17 PM   PAP / HPV   PAP (Historical)   NIL  NIL    HPV 16 DNA NEG^Negative Negative   Negative    HPV 18 DNA NEG^Negative Negative   Negative    Other HR HPV NEG^Negative Negative   Negative           Reviewed and updated as needed this visit by Provider   Tobacco  Allergies  Meds  Problems  Med Hx  Surg Hx  Fam Hx            Past Medical History:   Diagnosis Date    ADHD (attention deficit hyperactivity disorder)     Carpal tunnel syndrome     during pregnancy    Chickenpox     Depression      Past Surgical History:   Procedure Laterality Date    AS CREATE EARDRUM  OPENING,GEN ANESTH      LAPAROTOMY MINI, TUBAL LIGATION (POST PARTUM), COMBINED Bilateral 2021    Procedure: LIGATION, FALLOPIAN TUBE, POSTPARTUM;  Surgeon: Mellissa Torrez DO;  Location: [place] OR    [facility] SURGERY      wisdom teeth     OB History    Para Term  AB Living   3 3 3 0 0 3   SAB IAB Ectopic Multiple Live Births   0 0 0 0 3      # Outcome Date GA Lbr Antoine/2nd Weight Sex Type Anes PTL Lv   3 Term 21 38w6d 12:58 / 00:09 3.487 kg (7 lb 11 oz) M Vag-Spont EPI N RADHA      Name: ANGELO AMBRIZ-BHUPINDER      Apgar1: 8  Apgar5: 9   2 Term 19 39w2d / 00:48 3.81 kg (8 lb 6.4 oz) M Vag-Spont EPI N RADHA      Name: Hany      Apgar1: 9  Apgar5: 9   1 Term 10/28/06 40w0d  3.175 kg (7 lb) M    RADHA      Birth Comments: adopted out     BP Readings from Last 3 Encounters:   24 120/60   24 108/71   22 98/62    Wt Readings from Last 3 Encounters:   24 55.8 kg (123 lb)   24 64.9 kg (143 lb)   22 60.7 kg (133 lb 12.8 oz)                  Patient Active Problem List   Diagnosis    Other depression    Family history of hypertrophic cardiomyopathy    Recurrent major depressive disorder, in partial remission (H)    Cannabis use disorder, severe, dependence (H)    Alcohol use disorder, severe, in early remission, dependence (H)    Generalized anxiety disorder     Past Surgical History:   Procedure Laterality Date    AS CREATE EARDRUM OPENING,GEN ANESTH      LAPAROTOMY MINI, TUBAL LIGATION (POST PARTUM), COMBINED Bilateral 2021    Procedure: LIGATION, FALLOPIAN TUBE, POSTPARTUM;  Surgeon: Mellissa Torrez DO;  Location: [place] OR    [facility] SURGERY      wisdom teeth       Social History     Tobacco Use    Smoking status: Former     Current packs/day: 0.00     Average packs/day: 1 pack/day for 7.0 years (7.0 ttl pk-yrs)     Types: Cigarettes     Start date:      Quit date:      Years since quittin.8    Smokeless tobacco: Never    Tobacco comments:     quit  "5-6 year ago   Substance Use Topics    Alcohol use: Not Currently     Comment: sober since 3/13/2024     Family History   Problem Relation Age of Onset    Mental Illness Mother     Depression Mother     Hypertension Father     Hypertrophic cardiomyopathy Father     Coronary Artery Disease Maternal Grandmother         MI x3    Dementia Maternal Grandfather     Diabetes Maternal Grandfather     Cancer Paternal Grandmother     Cancer Paternal Grandfather     Supraventricular tachycardia Son     Substance Abuse Paternal Uncle     Substance Abuse Paternal Aunt          Current Outpatient Medications   Medication Sig Dispense Refill    amoxicillin-clavulanate (AUGMENTIN) 875-125 MG tablet Take 1 tablet by mouth 2 times daily for 5 days. 10 tablet 0    buPROPion (WELLBUTRIN XL) 300 MG 24 hr tablet Take 1 tablet (300 mg) by mouth every morning. 90 tablet 3    escitalopram (LEXAPRO) 20 MG tablet Take 1 tablet (20 mg) by mouth daily. 90 tablet 3    naltrexone (DEPADE/REVIA) 50 MG tablet Take 1 tablet (50 mg) by mouth daily (Patient not taking: Reported on 11/4/2024) 90 tablet 1     Allergies   Allergen Reactions    Tylenol W/Codeine [Acetaminophen-Codeine] Other (See Comments)     Causes lower back pain         Review of Systems  Constitutional, HEENT, cardiovascular, pulmonary, gi and gu systems are negative, except as otherwise noted.     Objective    Exam  /60 (BP Location: Right arm, Cuff Size: Adult Regular)   Pulse 98   Temp 98.4  F (36.9  C) (Tympanic)   Resp 16   Ht 1.6 m (5' 3\")   Wt 55.8 kg (123 lb)   LMP 10/21/2024   SpO2 99%   Breastfeeding No   BMI 21.79 kg/m     Estimated body mass index is 21.79 kg/m  as calculated from the following:    Height as of this encounter: 1.6 m (5' 3\").    Weight as of this encounter: 55.8 kg (123 lb).    Physical Exam  GENERAL: alert and no distress  EYES: Eyes grossly normal to inspection, and conjunctivae and sclerae normal  HENT: ear canals and TM's normal, nose " and mouth without ulcers or lesions  NECK: no adenopathy, no asymmetry, masses, or scars  RESP: lungs clear to auscultation - no rales, rhonchi or wheezes  BREAST: normal without masses, tenderness or nipple discharge and no palpable axillary masses or adenopathy  CV: regular rate and rhythm, normal S1 S2, no S3 or S4, no murmur, click or rub, no peripheral edema  ABDOMEN: soft, nontender, no hepatosplenomegaly, no masses and bowel sounds normal  MS: no gross musculoskeletal defects noted, no edema  SKIN: no suspicious lesions or rashes. Left index finger with erythema outlined mid digit, size approx 1 cm. Edema noted and decrease ROM to PIP joint due to edema and pain. No drainage. Skin with small scab.    NEURO: Normal strength and tone, mentation intact and speech normal  PSYCH: mentation appears normal, affect normal/bright        Signed Electronically by: GAMALIEL Yen CNP    Answers submitted by the patient for this visit:  Patient Health Questionnaire (Submitted on 11/4/2024)  If you checked off any problems, how difficult have these problems made it for you to do your work, take care of things at home, or get along with other people?: Somewhat difficult  PHQ9 TOTAL SCORE: 3  Patient Health Questionnaire (G7) (Submitted on 11/4/2024)  TRINITY 7 TOTAL SCORE: 4

## 2024-11-05 ENCOUNTER — VIRTUAL VISIT (OUTPATIENT)
Dept: BEHAVIORAL HEALTH | Facility: CLINIC | Age: 34
End: 2024-11-05
Payer: COMMERCIAL

## 2024-11-05 DIAGNOSIS — F41.1 GENERALIZED ANXIETY DISORDER: Primary | ICD-10-CM

## 2024-11-05 DIAGNOSIS — F33.41 RECURRENT MAJOR DEPRESSIVE DISORDER, IN PARTIAL REMISSION (H): ICD-10-CM

## 2024-11-05 PROCEDURE — 90834 PSYTX W PT 45 MINUTES: CPT | Mod: 95

## 2024-11-05 ASSESSMENT — COLUMBIA-SUICIDE SEVERITY RATING SCALE - C-SSRS
1. SINCE LAST CONTACT, HAVE YOU WISHED YOU WERE DEAD OR WISHED YOU COULD GO TO SLEEP AND NOT WAKE UP?: NO
2. HAVE YOU ACTUALLY HAD ANY THOUGHTS OF KILLING YOURSELF?: NO

## 2024-11-05 NOTE — PROGRESS NOTES
[facility]                                     Progress Note    Patient Name: Lanie Martin  Date: 24           Service Type: Individual      Session Start Time: 12.59  Session End Time: 1.39     Session Length: 38-52 minutes     Session #: 8    Attendees: Client attended alone    Service Modality:  Video Visit:      Provider verified identity through the following two step process.  Patient provided:  Patient  and Patient address    Telemedicine Visit: The patient's condition can be safely assessed and treated via synchronous audio and visual telemedicine encounter.      Reason for Telemedicine Visit: Patient has requested telehealth visit    Originating Site (Patient Location): Patient's place of employment    Distant Site (Provider Location): [facility]    Consent:  The patient/guardian has verbally consented to: the potential risks and benefits of telemedicine (video visit) versus in person care; bill my insurance or make self-payment for services provided; and responsibility for payment of non-covered services.     Patient would like the video invitation sent by:  My Chart    Mode of Communication:  Video Conference via [employer]    Distant Location (Provider):  Off-site    As the provider I attest to compliance with applicable laws and regulations related to telemedicine.    DATA  Interactive Complexity: No  Crisis: No         Progress Since Last Session (Related to Symptoms / Goals / Homework):   Symptoms: Improving . Patient's PHQ9 and GAD7 scores have decreased.     Homework: Partially completed      Episode of Care Goals: Satisfactory progress - PREPARATION (Decided to change - considering how); Intervened by negotiating a change plan and determining options / strategies for behavior change, identifying triggers, exploring social supports, and working towards setting a date to begin behavior change     Current / Ongoing  Stressors and Concerns:  Patient discussed her eldest son turning 18 years old. Patient discussed relationship dynamics with her , Bernabe. Patient discussed her  helping out more with the children lately. Provider provided psycho-education on positive and negative reinforcements as a part of Applied Behavioral Analysis theory. Patient discussed planning on going deer hunting this weekend. Patient discussed behavioral struggles with her sons. Patient discussed her affinity for chickens. Patient discussed noticing some alcohol cravings.        Treatment Objective(s) Addressed in This Session:   Increase interest, engagement, and pleasure in doing things  Decrease frequency and intensity of feeling anxious, nervous, worried       Intervention:   Motivational Interviewing    MI Intervention: Expressed Empathy/Understanding, Supported Autonomy, Collaboration, Evocation, Open-ended questions, and Reflections: simple and complex     Change Talk Expressed by the Patient: Desire to change    Provider Response to Change Talk: E - Evoked more info from patient about behavior change, A - Affirmed patient's thoughts, decisions, or attempts at behavior change, and R - Reflected patient's change talk    Assessments completed prior to visit:  The following assessments were completed by patient for this visit:  PHQ9:       6/13/2024     8:46 AM 7/25/2024    11:29 AM 8/7/2024    12:18 PM 9/9/2024     2:46 PM 10/9/2024    12:16 PM 10/21/2024     7:33 PM 11/4/2024     9:31 AM   PHQ-9 SCORE   PHQ-9 Total Score [facility] 5 (Mild depression) 4 (Minimal depression) 2 (Minimal depression) 2 (Minimal depression) 3 (Minimal depression) 9 (Mild depression) 3 (Minimal depression)   PHQ-9 Total Score 5 4 2 2 3 9 3        Patient-reported     GAD7:       3/5/2021    11:14 AM 5/10/2022     7:54 AM 8/28/2022     1:11 PM 12/20/2023     3:25 PM 1/24/2024     8:15 AM 7/22/2024    10:49 AM 11/4/2024     9:31 AM   TRINITY-7 SCORE   Total Score    4 (minimal anxiety) 5 (mild anxiety)  8 (mild anxiety) 4 (minimal anxiety)   Total Score 2 3 4 5 4 8 4        Patient-reported     PROMIS 10-Global Health (all questions and answers displayed):       2/1/2024    11:03 AM 2/14/2024     1:01 PM 2/23/2024     1:46 PM 6/10/2024    11:56 AM 7/18/2024     8:01 AM 10/21/2024     7:34 PM 11/5/2024    12:49 PM   PROMIS 10   In general, would you say your health is:     Good Fair Good   In general, would you say your quality of life is:     Fair Fair Good   In general, how would you rate your physical health?     Good Fair Fair   In general, how would you rate your mental health, including your mood and your ability to think?     Poor Fair Fair   In general, how would you rate your satisfaction with your social activities and relationships?     Good Poor Poor   In general, please rate how well you carry out your usual social activities and roles     Fair Poor Good   To what extent are you able to carry out your everyday physical activities such as walking, climbing stairs, carrying groceries, or moving a chair?     Completely Completely Completely   In the past 7 days, how often have you been bothered by emotional problems such as feeling anxious, depressed, or irritable?     Sometimes Sometimes Sometimes   In the past 7 days, how would you rate your fatigue on average?     Mild Mild Mild   In the past 7 days, how would you rate your pain on average, where 0 means no pain, and 10 means worst imaginable pain?     0 0 0   In general, would you say your health is:     3  2  3    In general, would you say your quality of life is:     2  2  3    In general, how would you rate your physical health?     3  2  2    In general, how would you rate your mental health, including your mood and your ability to think?     1  2  2    In general, how would you rate your satisfaction with your social activities and relationships?     3  1  1    In general, please rate how well you carry out  "your usual social activities and roles. (This includes activities at home, at work and in your community, and responsibilities as a parent, child, spouse, employee, friend, etc.)     2  1  3    To what extent are you able to carry out your everyday physical activities such as walking, climbing stairs, carrying groceries, or moving a chair?     5  5  5    In the past 7 days, how often have you been bothered by emotional problems such as feeling anxious, depressed, or irritable?     3  3  3    In the past 7 days, how would you rate your fatigue on average?     2  2  2    In the past 7 days, how would you rate your pain on average, where 0 means no pain, and 10 means worst imaginable pain?     0  0  0    Global Mental Health Score     9 8 9    Global Physical Health Score     17 16 16    PROMIS TOTAL - SUBSCORES     26 24 25        Information is confidential and restricted. Go to Review Flowsheets to unlock data.    Patient-reported     [place] Suicide Severity Rating Scale (Lifetime/Recent)      2/3/2019     3:50 AM 1/20/2021     7:53 PM 2/20/2024    10:00 AM 7/25/2024    11:54 AM   [place] Suicide Severity Rating (Lifetime/Recent)   Q1 Wished to be Dead (Past Month) no no     Q2 Suicidal Thoughts (Past Month) no no     Q3 Suicidal Thought Method  no     Q4 Suicidal Intent without Specific Plan  no     Q5 Suicide Intent with Specific Plan  no     Q6 Suicide Behavior (Lifetime) no no     Q1 Wish to be Dead (Lifetime)   Y N   Wish to be Dead Description (Lifetime)   The patient denied having any history of suicide attempts.    1. Wish to be Dead (Past 1 Month)   Y    Wish to be Dead Description (Past 1 Month)   The patient reported she had ONLY had passive suicide ideation, with no plan or intent to harm herself.  The thoughts had been along the lines of \"thongs would be easier if she were not around\".    Q2 Non-Specific Active Suicidal Thoughts (Lifetime)   Y N   Non-Specific Active Suicidal Thought Description " (Lifetime)   See above    2. Non-Specific Active Suicidal Thoughts (Past 1 Month)   Y    Non-Specific Active Suicidal Thought Description (Past 1 Month)   See above    3. Active Suicidal Ideation with any Methods (Not Plan) Without Intent to Act (Lifetime)   N    Active Suicidal Ideation with any Methods (Not Plan) Description (Lifetime)   NA    Q3 Active Suicidal Ideation with any Methods (Not Plan) Without Intent to Act (Past 1 Month)   N    Q4 Active Suicidal Ideation with Some Intent to Act, Without Specific Plan (Lifetime)   N    Active Suicidal Ideation with Some Intent to Act, Without Specific Plan Description (Lifetime)   NA    4. Active Suicidal Ideation with Some Intent to Act, Without Specific Plan (Past 1 Month)   N    Q5 Active Suicidal Ideation with Specific Plan and Intent (Lifetime)   N    Active Suicidal Ideation with Specific Plan and Intent Description (Lifetime)   NA    5. Active Suicidal Ideation with Specific Plan and Intent (Past 1 Month)   N    Most Severe Ideation Rating (Lifetime)   3    Description of Most Severe Ideation (Lifetime)   See abobe    Most Severe Ideation Rating (Past 1 Month)   2    Description of Most Severe Ideation (Past 1 Month)   See above    Frequency (Lifetime)   1    Frequency (Past 1 Month)   1    Duration (Lifetime)   3    Duration (Past 1 Month)   3    Controllability (Lifetime)   3    Controllability (Past 1 Month)   3    Deterrents (Lifetime)   1    Deterrents (Past 1 Month)   1    Reasons for Ideation (Lifetime)   5    Reasons for Ideation (Past 1 Month)   5    Actual Attempt (Lifetime)   N    Has subject engaged in non-suicidal self-injurious behavior? (Lifetime)   N    Interrupted Attempts (Lifetime)   N    Aborted or Self-Interrupted Attempt (Lifetime)   N    Preparatory Acts or Behavior (Lifetime)   N    Calculated C-SSRS Risk Score (Lifetime/Recent)   Low Risk          ASSESSMENT: Current Emotional / Mental Status (status of significant symptoms):   Risk  status (Self / Other harm or suicidal ideation)   Patient denies current fears or concerns for personal safety.   Patient denies current or recent suicidal ideation or behaviors.   Patient denies current or recent homicidal ideation or behaviors.   Patient denies current or recent self injurious behavior or ideation.   Patient denies other safety concerns.   Patient reports there has been no change in risk factors since their last session.     Patient reports there has been no change in protective factors since their last session.     Recommended that patient call 911 or go to the local ED should there be a change in any of these risk factors     Appearance:   Appropriate    Eye Contact:   Good    Psychomotor Behavior: Normal    Attitude:   Cooperative  Pleasant   Orientation:   All   Speech    Rate / Production: Normal/ Responsive Talkative    Volume:  Normal    Mood:    Anxious    Affect:    Appropriate    Thought Content:  Clear    Thought Form:  Coherent  Logical    Insight:    Fair      Medication Review:   No changes to current psychiatric medication(s)     Medication Compliance:   Yes     Changes in Health Issues:   None reported     Chemical Use Review:   Substance Use: Chemical use reviewed, no active concerns identified .  However, patient's daily cannabis use will continue to be evaluated for Cannabis Use Disorder. Patient reported successfully completing chemical dependency treatment at [facility] and has reportedly been sober from alcohol use since February 2024.      Tobacco Use: No current tobacco use.      Diagnosis:  1. Generalized anxiety disorder    2. Recurrent major depressive disorder, in partial remission (H)    Other Diagnoses that is relevant to services: Alcohol Use Disorder, currently in partial remission   Provisional Diagnoses in the process of being ruled-out: ADHD & PTSD      Collateral Reports Completed:   Not Applicable    PLAN: (Patient Tasks / Therapist Tasks /  Other)  Patient will return in 2 weeks for next session. Patient will engage in self-care activities. Patient will work on mindfulness skills to increase awareness of triggers to anxiety, irritability, and depression. Patient will work on open and direct communication skills with her . Patient will work on positive and negative reinforcement of her 's behaviors. Patient will work on setting clear boundaries and expectations with her spouse. Patient will consider increasing to 3 consistent meals throughout the day. Patient will continue abstaining from alcohol use.      Melanie Lei, [facility]                                                         ______________________________________________________________________    Individual Treatment Plan    Patient's Name: Lanie Martin  YOB: 1990    Date of Creation: 8/1/24  Date Treatment Plan Last Reviewed/Revised: 11/5/24    DSM5 Diagnoses:   Encounter Diagnoses   Name Primary?    Generalized anxiety disorder Yes    Recurrent major depressive disorder, in partial remission (H)        Psychosocial / Contextual Factors: Substance use, relationship stressors, occupational dynamics.  PROMIS (reviewed every 90 days):   PROMIS-10 from encounters over the past 365 days     11/5/24 10/22/24 7/25/24   Global Mental Health Score (P)  9 (P) 8 (P) 9   Global Physical Health Score (P)  16 (P) 16 (P) 17   PROMIS TOTAL - SUBSCORES (P)  25 (P) 24 (P) 26       Referral / Collaboration:  Referral to another professional/service is not indicated at this time..    Anticipated number of session for this episode of care: 9-12 sessions  Anticipation frequency of session: Biweekly  Anticipated Duration of each session: 38-52 minutes  Treatment plan will be reviewed in 90 days or when goals have been changed.       MeasurableTreatment Goal(s) related to diagnosis / functional impairment(s)  Goal 1: Patient will reduce depression symptoms as evidenced by a reduction  in their PHQ9 score to a 2 or less in the next 2 months.     I will know I've met my goal when I'm smiling more.      Objective #A (Patient Action)    Patient will Decrease frequency and intensity of feeling down, depressed, hopeless.  Status: Continued - Date(s): 11/5/24     Intervention(s)  Therapist will teach  distress tolerance and coping skills .    Objective #B  Patient will Increase interest, engagement, and pleasure in doing things.  Status: Continued - Date(s): 11/5/24     Intervention(s)  Therapist will  teach self-care skills .    Objective #C  Patient will Improve diet, appetite, mindful eating, and / or meal planning.  Status: Continued - Date(s): 11/5/24     Intervention(s)  Therapist will  provide psycho-education on the impact and effects of nutrition on mental health.  .        Goal 2: Patient will reduce anxiety symptoms as evidenced by a reduction in their GAD7 score to a 4 or less in the next 2 months.     I will know I've met my goal when I don't panic when someone walks in for their shift & I'm not stressing out when my  sighs.      Objective #A (Patient Action)    Status: Continued - Date(s): 11/5/24     Patient will identify at least 2 triggers for anxiety.    Intervention(s)  Therapist will  teach mindfulness skills .    Objective #B  Patient will use thought-stopping strategy daily to reduce intrusive thoughts.    Status: Continued - Date(s): 11/5/24     Intervention(s)  Therapist will  teach thought-stopping and thought-challenging techniques .        Patient has reviewed and agreed to the above plan.      Melanie Lei [facility] on 11/5/2024 at 1:07 PM

## 2024-11-07 ENCOUNTER — MYC MEDICAL ADVICE (OUTPATIENT)
Dept: FAMILY MEDICINE | Facility: CLINIC | Age: 34
End: 2024-11-07
Payer: COMMERCIAL

## 2024-11-07 NOTE — TELEPHONE ENCOUNTER
Forms/Letter Request    Type of form/letter: Proof of annual physical        Do we have the form/letter: Yes: placed in Berta's box for review     Who is the form from? [employer].       How would you like the form/letter returned:  E-mail: [email or web address]     SABINE Mclean

## 2024-11-19 ENCOUNTER — VIRTUAL VISIT (OUTPATIENT)
Dept: BEHAVIORAL HEALTH | Facility: CLINIC | Age: 34
End: 2024-11-19
Payer: COMMERCIAL

## 2024-11-19 DIAGNOSIS — F33.41 RECURRENT MAJOR DEPRESSIVE DISORDER, IN PARTIAL REMISSION (H): ICD-10-CM

## 2024-11-19 DIAGNOSIS — F41.1 GENERALIZED ANXIETY DISORDER: ICD-10-CM

## 2024-11-19 PROCEDURE — 99207 PR NO CHARGE LOS: CPT | Mod: 95

## 2024-11-19 NOTE — PROGRESS NOTES
Patient joined the session briefly to inform this provider that she would have to cancel today's session due to something coming up requiring her to  her son from school. Patient and provider rescheduled future appointments.

## 2024-12-10 ASSESSMENT — PATIENT HEALTH QUESTIONNAIRE - PHQ9
SUM OF ALL RESPONSES TO PHQ QUESTIONS 1-9: 7
10. IF YOU CHECKED OFF ANY PROBLEMS, HOW DIFFICULT HAVE THESE PROBLEMS MADE IT FOR YOU TO DO YOUR WORK, TAKE CARE OF THINGS AT HOME, OR GET ALONG WITH OTHER PEOPLE: SOMEWHAT DIFFICULT
SUM OF ALL RESPONSES TO PHQ QUESTIONS 1-9: 7

## 2024-12-11 ENCOUNTER — VIRTUAL VISIT (OUTPATIENT)
Dept: ADDICTION MEDICINE | Facility: CLINIC | Age: 34
End: 2024-12-11
Payer: COMMERCIAL

## 2024-12-11 DIAGNOSIS — F33.41 RECURRENT MAJOR DEPRESSIVE DISORDER, IN PARTIAL REMISSION (H): ICD-10-CM

## 2024-12-11 DIAGNOSIS — F10.21 ALCOHOL USE DISORDER, SEVERE, IN EARLY REMISSION, DEPENDENCE (H): Primary | ICD-10-CM

## 2024-12-11 DIAGNOSIS — F90.9 ATTENTION DEFICIT HYPERACTIVITY DISORDER (ADHD), UNSPECIFIED ADHD TYPE: ICD-10-CM

## 2024-12-11 DIAGNOSIS — F12.20 CANNABIS USE DISORDER, SEVERE, DEPENDENCE (H): ICD-10-CM

## 2024-12-11 ASSESSMENT — PAIN SCALES - GENERAL: PAINLEVEL_OUTOF10: NO PAIN (0)

## 2024-12-11 NOTE — PATIENT INSTRUCTIONS
Patient Education       NAC  I recommend using N-acetylcystine (aka NAC). This is a supplement that can be purchased at any [employer], [employer], or . If it works, it will help reduce cravings to use cannabis     The lowest recommended dose is 600mg twice daily. I suggest you start with this dose and see if you notice any effects, positive or otherwise. There is not a clear side-effect profile to warn you about. Please talk with a pharmacist where you  your medications to ask about any further side-effect concerns.     If you see results, we can stay at this dose. If not, we can increase the dose. The maximum recommended is 2400mg daily.      Addiction Medicine  What to Expect  Here's what to expect from our Addiction Medicine program.  About Addiction Medicine  Addiction Medicine clinics help you with substance use problems. You set your own goals. We try to help you reach your goals. Your care plan can include:  Medicine  Creating a recovery plan  Helping you find local resources  Helping with treatment options  Clinic phone number and addresses  Clinic Phone: [phone]  Mental Health and Addiction Clinic  [facility]  [address] , [address]  [place] [address]  [facility]  [address] [facility], [address]  [place] [address]  Walk-in services  We offer walk-in care for patients at the Recovery Clinic. This is only for patients with Opioid Use Disorder (OUD). Anyone with OUD is welcome. Our providers will refer you to the Recovery Clinic if you're struggling to keep up with your medicines or appointments.  Recovery Clinic ([facility])  [address], [address]  [place] [address]  Phone: [phone]  The Recovery Clinic is open for walk-ins Monday to Friday 9 a.m. to 11:30 a.m. and 12:30 p.m. to 3 p.m.  How it works  Come to your visits every time. The treatment works better when you do.   You can have as many visits  "as you need. When you're better, we'll refer you back to being cared for by your family doctor.   If you need it, we'll send you to doctors, psychiatrists, therapists, and other providers. We focus on treating addiction. We don't treat other problems, like managing other medicines or non-addiction issues.  About visits  Urine drug testing  We'll often test your pee (urine) for drugs. This is the only way we can know for sure whether or not you're using drugs. It helps us treat you without judgement.   Suboxone (buprenorphine)  If you're taking buprenorphine, you'll have a lot of visits at first. If your problem is getting worse, or you're using substances, we may schedule you for extra visits.   Cancelling visits  If you can't come to your visit, please call us right away at [phone]. If you don't cancel at least 24 hours (1 full day) before your visit, that's \"late cancellation.\"  Being late to visits  If you come late, you may not be seen. This will count as a \"no-show.\"  Please call the clinic if you're running late. This will help us plan, but it doesn't mean you'll be seen.   Being late is:  More than 15 minutes late for a return visit.  More than 30 minutes late for your first visit.  If you cancel late or don't show up 2 times within 6 months, we may transfer you to another clinic.   Getting help between visits  If you need help between visits, you can call us Monday to Friday from 8 a.m. to 4:30 p.m. at [phone]. You can also send us a message on [employer].  Medicine refills  If you miss or cancel a visit, you can still ask for a refill. But we can only refill your medicines if you've made a new appointment.  Please call your pharmacy for medicine refills. If you have a question about your refill, call us at [phone].  It takes up to 2 business days to refill your drugs. Let us know 2 to 3 days before you run out. Don't call more than 1 week before you run out. That's too early.   Please make " sure we have your right phone number.  If we have a problem with your refill, we'll call you. If we call you, please call us back right away. If you don't, you may not get your medicines quickly.   Call your pharmacy to find out if your medicines are ready.   Keep your medicines in a safe place. Keep them away from pets and children. If your medicines are lost or stolen, we usually don't replace them. We recommend you file a police report if your medicines are stolen. Your insurance may not pay for early refills, even if you have a prescription.  Forms  Please give us at least 3 business days to fill out any forms. Bring the forms to your visits if you can. We may refer you to other members of your care team to complete the forms.   Emergency care   Call 911 or go to the nearest emergency room if your life or someone else's life is in danger.  Call 988 anytime for the Suicide and Crisis Lifeline.  If you need care when we're closed, call your family doctor to see if they can help. You can also go to urgent care or an emergency room. [facility] emergency rooms may be able to give you buprenorphine or other medicine refills.  Thank you for choosing us for your care.  For informational purposes only. Not to replace the advice of your health care provider. Copyright   2023 [facility]. All rights reserved. [employer] [id number] - REV 05/23.

## 2024-12-11 NOTE — PROGRESS NOTES
Virtual Visit Details    Type of service:  Video Visit   Joined the call at 12/11/2024, 12:29:22 pm.  Left the call at 12/11/2024, 12:56:32 pm.    Distant Location (provider location):  Off-site  Platform used for Video Visit: "[employer]          Answers submitted by the patient for this visit:  Patient Health Questionnaire (Submitted on 12/10/2024)  If you checked off any problems, how difficult have these problems made it for you to do your work, take care of things at home, or get along with other people?: Somewhat difficult  PHQ9 TOTAL SCORE: 7         [facility]    A/P                                                    ASSESSMENT/PLAN  1. Alcohol use disorder, severe, in early remission, dependence (H) (Primary)  -controlled, no use, occasional manageable cravings.  Last use March 13  -encouraged recovery activities and increasing psychosocial supports  -has naltrexone available if alcohol cravings unmanageable  -2 month follow up    2. Recurrent major depressive disorder, in partial remission (H)  -controlled, no change  -has buproprion refills available     3. Cannabis use disorder, severe, dependence (H)  -needs improvement  Nightly cannabis use  -discussed NAC     NAC  I recommend using N-acetylcystine (aka NAC). This is a supplement that can be purchased at any "[employer], or . If it works, it will help reduce cravings to use cannabis     The lowest recommended dose is 600mg twice daily. I suggest you start with this dose and see if you notice any effects, positive or otherwise. There is not a clear side-effect profile to warn you about. Please talk with a pharmacist where you  your medications to ask about any further side-effect concerns.     If you see results, we can stay at this dose. If not, we can increase the dose. The maximum recommended is 2400mg daily.               Continued Complex Management  The longitudinal plan of care for Alcohol Use  Disorder (AUD) was addressed during this visit. Due to the added complexity in care, I will continue to support Kayla in the subsequent management and with ongoing continuity of care.      Last encounter A/P  1. Alcohol use disorder, severe, in early remission, dependence (H)  -controlled, no use, no cravings.  Last use March 13  Kayla would like to discontinue naltrexone due to frequent missed doses and once she resumes she experiences side effects.  She has completed IOP programming and followed all recommendations.She continues contact with her group members, and feels confident in her recovery. with maintained diligence it is reasonable to consider discontinuing and monitoring for returning of cravings.   -will continue to follow every two months      2. Recurrent major depressive disorder, in partial remission (H)  -controlled, no change   - buPROPion (WELLBUTRIN XL) 300 MG 24 hr tablet; Take 1 tablet (300 mg) by mouth every morning.  Dispense: 90 tablet; Refill: 0     3. Cannabis use disorder, severe, dependence (H)  -no change  -continue to ask, assess and advise      4. Attention deficit hyperactivity disorder (ADHD), unspecified ADHD type  - buPROPion (WELLBUTRIN XL) 300 MG 24 hr tablet; Take 1 tablet (300 mg) by mouth every morning.  Dispense: 90 tablet; Refill: 0        Oct 9, 2024  - discontinued naltrexone, will keep on med list in the event cravings resume.          PDMP Review         Value Time User    State PDMP site checked  Yes 6/13/2024 10:05 AM Kaci Prather NP              RTC  Return in about 2 months (around 2/11/2025).      Counseled the patient on the importance of having a recovery program in addition to medication to manage recovery.  Components include avoiding isolating, having willingness to change, avoiding triggers and managing cravings. Encouraged having some type of sober network and practicing honesty with trusted support person(s). Encouraged other services such as  "counseling, 12 step or other self-help organizations.          SUBJECTIVE                                                    Lanie Martin is a 34 year old female who presents to clinic today for follow up    Visit performed virtual video     MISBAH History:  Substance Use History:   \"Have you ever had any history with [...] use?\" And \"When was your last use?  ALCOHOL - March 13  CANNABIS - daily  PRESCRIPTION STIMULANTS (includes Ritalin, Adderall, Vyvanse) - denies  COCAINE/CRACK - denies  METH/AMPHETAMINES (includes ecstacy, MDMA/alona) - denies  OPIATES - denies  BENZODIAZEPINES (includes Ativan, Klonopin, Xanax) - denies  KRATOM (mild opioid and stimulant effects) - denies  KETAMINE - denies  HALLUCINOGENS (includes DXM) - denies  BEHAVIORAL (Gambling, Eating d/o, Compulsivity) -   History of treatment -   NICOTINE  Cigarettes:   Chew/snus:   Vaping:   Past NRT/medication use:         Previous withdrawal treatment episodes (e.g. detox):   Previous MISBAH treatment programs: none  Hospitalizations or overdose: denies  Medical complications from substance use: denies  IV Drug use?: denies  Previous Medication for Addiction Tx: naltrexone  Longest period of full abstinence:   Activities that have previously supported abstinence:   Current Recovery Activities:     Recent HPI Details:  HPI Oct 9, 2024  - Oldest struggled with starting .  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 No Cravings, no concerns did not use.    Having to decrease hours at work due to  needs.    Stopped naltrexone 1 week ago.    Sober since March 13.   No current recovery activities, facebook groups, kids busy schedule. Maintaining connection with [employer].         TODAY'S VISIT  HPI Dec 11, 2024  - Working with getting son diagnosed with ADHD.  Continues with cannabis nightly.  Does not feel problematic.  Work stressors, Boss and several coworkers actively drinking at work.  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  " Considering looking for new work.  Hunting season, has not gotten a deer.    No alcohol use.  Plans on reassessing at one year tara.  Has neighbor who is working on counseling degree which she finds supportive.  Not as much contact with members from IOP program.              OBJECTIVE  PHYSICAL EXAM:  [facility] 10/21/2024     GENERAL: healthy, alert and no distress  EYES: Eyes grossly normal to inspection, PERRL and conjunctivae and sclerae normal  RESP: No respiratory distress  MENTAL STATUS EXAM  Appearance/Behavior: No appearant distress  Speech: Normal  Mood/Affect: normal affect  Insight: Adequate      PHQ-9 Score:       11/4/2024     9:31 AM 11/19/2024     1:56 PM 12/10/2024     5:43 AM   PHQ   PHQ-9 Total Score 3  8  7    Q9: Thoughts of better off dead/self-harm past 2 weeks Not at all  Not at all  Not at all        Patient-reported       TRINITY-7 Score:      1/24/2024     8:15 AM 7/22/2024    10:49 AM 11/4/2024     9:31 AM   TRINITY-7 SCORE   Total Score  8 (mild anxiety) 4 (minimal anxiety)   Total Score 4 8 4        Patient-reported       LABS (may not contain today's labs)                                                      Today's lab data  No results found for any visits on 12/11/24.        HISTORY                                                    Problem list reviewed & adjusted, as indicated.  Patient Active Problem List   Diagnosis    Other depression    Family history of hypertrophic cardiomyopathy    Recurrent major depressive disorder, in partial remission (H)    Cannabis use disorder, severe, dependence (H)    Alcohol use disorder, severe, in early remission, dependence (H)    Generalized anxiety disorder         MEDICATION LIST (prior to visit)  Current Outpatient Medications   Medication Sig Dispense Refill    buPROPion (WELLBUTRIN XL) 300 MG 24 hr tablet Take 1 tablet (300 mg) by mouth every morning. 90 tablet 3    escitalopram (LEXAPRO) 20 MG tablet Take 1 tablet (20 mg) by mouth daily. 90 tablet 3     naltrexone (DEPADE/REVIA) 50 MG tablet Take 1 tablet (50 mg) by mouth daily (Patient not taking: Reported on 11/4/2024) 90 tablet 1     No current facility-administered medications for this visit.       MEDICATION LIST (after visit)  Current Outpatient Medications   Medication Sig Dispense Refill    buPROPion (WELLBUTRIN XL) 300 MG 24 hr tablet Take 1 tablet (300 mg) by mouth every morning. 90 tablet 3    escitalopram (LEXAPRO) 20 MG tablet Take 1 tablet (20 mg) by mouth daily. 90 tablet 3    naltrexone (DEPADE/REVIA) 50 MG tablet Take 1 tablet (50 mg) by mouth daily (Patient not taking: Reported on 11/4/2024) 90 tablet 1     No current facility-administered medications for this visit.         Allergies   Allergen Reactions    Tylenol W/Codeine [Acetaminophen-Codeine] Other (See Comments)     Causes lower back pain         Kaci Prather NP  [facility]  [phone]

## 2024-12-11 NOTE — NURSING NOTE
Is the patient currently in the state of [place]? YES    Current patient location:  At work.    Visit mode:VIDEO    If the visit is dropped, the patient can be reconnected by: VIDEO VISIT: Text to cell phone:   Telephone Information:   Mobile [phone]       Will anyone else be joining the visit? No  (If patient encounters technical issues they should call [phone])    Are changes needed to the allergy or medication list? Yes Medications flagged for removal; and Pt stated no changes to allergies    Are refills needed on medications prescribed by this physician? No    Rooming Documentation: Questionnaire(s) completed.    Reason for visit: RECHECK     Dulce Maria Rodriguez, HETALF

## 2024-12-12 ENCOUNTER — VIRTUAL VISIT (OUTPATIENT)
Dept: BEHAVIORAL HEALTH | Facility: CLINIC | Age: 34
End: 2024-12-12
Payer: COMMERCIAL

## 2024-12-12 DIAGNOSIS — F33.41 RECURRENT MAJOR DEPRESSIVE DISORDER, IN PARTIAL REMISSION (H): Primary | ICD-10-CM

## 2024-12-12 DIAGNOSIS — F41.1 GENERALIZED ANXIETY DISORDER: ICD-10-CM

## 2024-12-12 NOTE — PROGRESS NOTES
[facility]                                     Progress Note    Patient Name: Lanie Martin  Date: 24           Service Type: Individual      Session Start Time: 3.00  Session End Time: 3.42     Session Length: 38-52 minutes     Session #: 9    Attendees: Client attended alone    Service Modality:  Video Visit:      Provider verified identity through the following two step process.  Patient provided:  Patient  and Patient address    Telemedicine Visit: The patient's condition can be safely assessed and treated via synchronous audio and visual telemedicine encounter.      Reason for Telemedicine Visit: Patient has requested telehealth visit    Originating Site (Patient Location): Patient's place of employment    Distant Site (Provider Location): [facility]    Consent:  The patient/guardian has verbally consented to: the potential risks and benefits of telemedicine (video visit) versus in person care; bill my insurance or make self-payment for services provided; and responsibility for payment of non-covered services.     Patient would like the video invitation sent by:  My Chart    Mode of Communication:  Video Conference via [employer]    Distant Location (Provider):  On-site    As the provider I attest to compliance with applicable laws and regulations related to telemedicine.    DATA  Interactive Complexity: No  Crisis: No         Progress Since Last Session (Related to Symptoms / Goals / Homework):   Symptoms: Improving . Patient's PHQ9 and GAD7 scores have decreased.     Homework: Partially completed      Episode of Care Goals: Satisfactory progress - PREPARATION (Decided to change - considering how); Intervened by negotiating a change plan and determining options / strategies for behavior change, identifying triggers, exploring social supports, and working towards setting a date to begin behavior change     Current / Ongoing  Stressors and Concerns:  Patient discussed feeling defeated after not getting any deer during this hunting season. Patient discussed noticing some alcohol cravings when deer hunting this year. Patient discussed her son, Trice, getting tested for ADHD at school and getting him set up with an IEP. Patient discussed her son's behavioral struggles. Patient discussed relationship dynamics with her , Bernabe. Patient discussed occupational stressors. Patient discussed looking into other employment opportunities. Patient discussed financial stressors.        Treatment Objective(s) Addressed in This Session:   Increase interest, engagement, and pleasure in doing things  Decrease frequency and intensity of feeling anxious, nervous, worried       Intervention:   Motivational Interviewing    MI Intervention: Expressed Empathy/Understanding, Supported Autonomy, Collaboration, Evocation, Open-ended questions, and Reflections: simple and complex     Change Talk Expressed by the Patient: Desire to change    Provider Response to Change Talk: E - Evoked more info from patient about behavior change, A - Affirmed patient's thoughts, decisions, or attempts at behavior change, and R - Reflected patient's change talk    Assessments completed prior to visit:  The following assessments were completed by patient for this visit:  PHQ9:       8/7/2024    12:18 PM 9/9/2024     2:46 PM 10/9/2024    12:16 PM 10/21/2024     7:33 PM 11/4/2024     9:31 AM 11/19/2024     1:56 PM 12/10/2024     5:43 AM   PHQ-9 SCORE   PHQ-9 Total Score MyChart 2 (Minimal depression) 2 (Minimal depression) 3 (Minimal depression) 9 (Mild depression) 3 (Minimal depression) 8 (Mild depression) 7 (Mild depression)   PHQ-9 Total Score 2 2 3 9 3  8  7        Patient-reported     GAD7:       3/5/2021    11:14 AM 5/10/2022     7:54 AM 8/28/2022     1:11 PM 12/20/2023     3:25 PM 1/24/2024     8:15 AM 7/22/2024    10:49 AM 11/4/2024     9:31 AM   TRINITY-7 SCORE   Total Score    4 (minimal anxiety) 5 (mild anxiety)  8 (mild anxiety) 4 (minimal anxiety)   Total Score 2 3 4 5 4 8 4        Patient-reported     PROMIS 10-Global Health (all questions and answers displayed):       2/23/2024     1:46 PM 6/10/2024    11:56 AM 7/18/2024     8:01 AM 10/21/2024     7:34 PM 11/5/2024    12:49 PM 11/19/2024     1:58 PM 12/10/2024     5:45 AM   PROMIS 10   In general, would you say your health is:   Good Fair Good Fair    In general, would you say your quality of life is:   Fair Fair Good Good    In general, how would you rate your physical health?   Good Fair Fair Good    In general, how would you rate your mental health, including your mood and your ability to think?   Poor Fair Fair Fair    In general, how would you rate your satisfaction with your social activities and relationships?   Good Poor Poor Fair    In general, please rate how well you carry out your usual social activities and roles   Fair Poor Good Good    To what extent are you able to carry out your everyday physical activities such as walking, climbing stairs, carrying groceries, or moving a chair?   Completely Completely Completely Completely    In the past 7 days, how often have you been bothered by emotional problems such as feeling anxious, depressed, or irritable?   Sometimes Sometimes Sometimes Often    In the past 7 days, how would you rate your fatigue on average?   Mild Mild Mild Moderate    In the past 7 days, how would you rate your pain on average, where 0 means no pain, and 10 means worst imaginable pain?   0 0 0 0    In general, would you say your health is:   3  2  3  2     In general, would you say your quality of life is:   2  2  3  3     In general, how would you rate your physical health?   3  2  2  3     In general, how would you rate your mental health, including your mood and your ability to think?   1  2  2  2     In general, how would you rate your satisfaction with your social activities and relationships?   3   "1  1  2     In general, please rate how well you carry out your usual social activities and roles. (This includes activities at home, at work and in your community, and responsibilities as a parent, child, spouse, employee, friend, etc.)   2  1  3  3     To what extent are you able to carry out your everyday physical activities such as walking, climbing stairs, carrying groceries, or moving a chair?   5  5  5  5     In the past 7 days, how often have you been bothered by emotional problems such as feeling anxious, depressed, or irritable?   3  3  3  4     In the past 7 days, how would you rate your fatigue on average?   2  2  2  3     In the past 7 days, how would you rate your pain on average, where 0 means no pain, and 10 means worst imaginable pain?   0  0  0  0     Global Mental Health Score   9 8 9  9     Global Physical Health Score   17 16 16  16     PROMIS TOTAL - SUBSCORES   26 24 25  25         Information is confidential and restricted. Go to Review Flowsheets to unlock data.    Patient-reported     [place] Suicide Severity Rating Scale (Lifetime/Recent)      2/3/2019     3:50 AM 1/20/2021     7:53 PM 2/20/2024    10:00 AM 7/25/2024    11:54 AM   [place] Suicide Severity Rating (Lifetime/Recent)   Q1 Wished to be Dead (Past Month) no no     Q2 Suicidal Thoughts (Past Month) no no     Q3 Suicidal Thought Method  no     Q4 Suicidal Intent without Specific Plan  no     Q5 Suicide Intent with Specific Plan  no     Q6 Suicide Behavior (Lifetime) no no     Q1 Wish to be Dead (Lifetime)   Y N   Wish to be Dead Description (Lifetime)   The patient denied having any history of suicide attempts.    1. Wish to be Dead (Past 1 Month)   Y    Wish to be Dead Description (Past 1 Month)   The patient reported she had ONLY had passive suicide ideation, with no plan or intent to harm herself.  The thoughts had been along the lines of \"thongs would be easier if she were not around\".    Q2 Non-Specific Active Suicidal " Thoughts (Lifetime)   Y N   Non-Specific Active Suicidal Thought Description (Lifetime)   See above    2. Non-Specific Active Suicidal Thoughts (Past 1 Month)   Y    Non-Specific Active Suicidal Thought Description (Past 1 Month)   See above    3. Active Suicidal Ideation with any Methods (Not Plan) Without Intent to Act (Lifetime)   N    Active Suicidal Ideation with any Methods (Not Plan) Description (Lifetime)   NA    Q3 Active Suicidal Ideation with any Methods (Not Plan) Without Intent to Act (Past 1 Month)   N    Q4 Active Suicidal Ideation with Some Intent to Act, Without Specific Plan (Lifetime)   N    Active Suicidal Ideation with Some Intent to Act, Without Specific Plan Description (Lifetime)   NA    4. Active Suicidal Ideation with Some Intent to Act, Without Specific Plan (Past 1 Month)   N    Q5 Active Suicidal Ideation with Specific Plan and Intent (Lifetime)   N    Active Suicidal Ideation with Specific Plan and Intent Description (Lifetime)   NA    5. Active Suicidal Ideation with Specific Plan and Intent (Past 1 Month)   N    Most Severe Ideation Rating (Lifetime)   3    Description of Most Severe Ideation (Lifetime)   See abobe    Most Severe Ideation Rating (Past 1 Month)   2    Description of Most Severe Ideation (Past 1 Month)   See above    Frequency (Lifetime)   1    Frequency (Past 1 Month)   1    Duration (Lifetime)   3    Duration (Past 1 Month)   3    Controllability (Lifetime)   3    Controllability (Past 1 Month)   3    Deterrents (Lifetime)   1    Deterrents (Past 1 Month)   1    Reasons for Ideation (Lifetime)   5    Reasons for Ideation (Past 1 Month)   5    Actual Attempt (Lifetime)   N    Has subject engaged in non-suicidal self-injurious behavior? (Lifetime)   N    Interrupted Attempts (Lifetime)   N    Aborted or Self-Interrupted Attempt (Lifetime)   N    Preparatory Acts or Behavior (Lifetime)   N    Calculated C-SSRS Risk Score (Lifetime/Recent)   Low Risk          ASSESSMENT:  Current Emotional / Mental Status (status of significant symptoms):   Risk status (Self / Other harm or suicidal ideation)   Patient denies current fears or concerns for personal safety.   Patient denies current or recent suicidal ideation or behaviors.   Patient denies current or recent homicidal ideation or behaviors.   Patient denies current or recent self injurious behavior or ideation.   Patient denies other safety concerns.   Patient reports there has been no change in risk factors since their last session.     Patient reports there has been no change in protective factors since their last session.     Recommended that patient call 911 or go to the local ED should there be a change in any of these risk factors     Appearance:   Appropriate    Eye Contact:   Good    Psychomotor Behavior: Normal    Attitude:   Cooperative  Pleasant   Orientation:   All   Speech    Rate / Production: Normal/ Responsive Talkative    Volume:  Normal    Mood:    Anxious    Affect:    Appropriate    Thought Content:  Clear    Thought Form:  Coherent  Logical    Insight:    Fair      Medication Review:   No changes to current psychiatric medication(s)     Medication Compliance:   Yes     Changes in Health Issues:   None reported     Chemical Use Review:   Substance Use: Chemical use reviewed, no active concerns identified .  However, patient's daily cannabis use will continue to be evaluated for Cannabis Use Disorder. Patient reported successfully completing chemical dependency treatment at [facility] and has reportedly been sober from alcohol use since February 2024.      Tobacco Use: No current tobacco use.      Diagnosis:  1. Recurrent major depressive disorder, in partial remission (H)    2. Generalized anxiety disorder    Other Diagnoses that is relevant to services: Alcohol Use Disorder, currently in partial remission   Provisional Diagnoses in the process of being ruled-out: ADHD & PTSD      Collateral Reports  Completed:   Not Applicable    PLAN: (Patient Tasks / Therapist Tasks / Other)  Patient will return in 2 weeks for next session. Patient will engage in self-care activities. Patient will work on mindfulness skills to increase awareness of triggers to anxiety, irritability, and depression. Patient will work on open and direct communication skills with her . Patient will work on positive and negative reinforcement of her 's behaviors. Patient will work on setting clear boundaries and expectations with her spouse. Patient will consider increasing to 3 consistent meals throughout the day. Patient will continue abstaining from alcohol use.       Melanie Lei, [facility]                                                         ______________________________________________________________________    Individual Treatment Plan    Patient's Name: Lanie Martin  YOB: 1990    Date of Creation: 8/1/24  Date Treatment Plan Last Reviewed/Revised: 11/5/24    DSM5 Diagnoses:   Encounter Diagnoses   Name Primary?    Recurrent major depressive disorder, in partial remission (H) Yes    Generalized anxiety disorder        Psychosocial / Contextual Factors: Substance use, relationship stressors, occupational dynamics.  PROMIS (reviewed every 90 days):   PROMIS-10 from encounters over the past 365 days     11/5/24 10/22/24 7/25/24   Global Mental Health Score (P)  9 (P) 8 (P) 9   Global Physical Health Score (P)  16 (P) 16 (P) 17   PROMIS TOTAL - SUBSCORES (P)  25 (P) 24 (P) 26       Referral / Collaboration:  Referral to another professional/service is not indicated at this time..    Anticipated number of session for this episode of care: 9-12 sessions  Anticipation frequency of session: Biweekly  Anticipated Duration of each session: 38-52 minutes  Treatment plan will be reviewed in 90 days or when goals have been changed.       MeasurableTreatment Goal(s) related to diagnosis / functional  impairment(s)  Goal 1: Patient will reduce depression symptoms as evidenced by a reduction in their PHQ9 score to a 2 or less in the next 2 months.     I will know I've met my goal when I'm smiling more.      Objective #A (Patient Action)    Patient will Decrease frequency and intensity of feeling down, depressed, hopeless.  Status: Continued - Date(s): 11/5/24     Intervention(s)  Therapist will teach  distress tolerance and coping skills .    Objective #B  Patient will Increase interest, engagement, and pleasure in doing things.  Status: Continued - Date(s): 11/5/24     Intervention(s)  Therapist will  teach self-care skills .    Objective #C  Patient will Improve diet, appetite, mindful eating, and / or meal planning.  Status: Continued - Date(s): 11/5/24     Intervention(s)  Therapist will  provide psycho-education on the impact and effects of nutrition on mental health.  .        Goal 2: Patient will reduce anxiety symptoms as evidenced by a reduction in their GAD7 score to a 4 or less in the next 2 months.     I will know I've met my goal when I don't panic when someone walks in for their shift & I'm not stressing out when my  sighs.      Objective #A (Patient Action)    Status: Continued - Date(s): 11/5/24     Patient will identify at least 2 triggers for anxiety.    Intervention(s)  Therapist will  teach mindfulness skills .    Objective #B  Patient will use thought-stopping strategy daily to reduce intrusive thoughts.    Status: Continued - Date(s): 11/5/24     Intervention(s)  Therapist will  teach thought-stopping and thought-challenging techniques .        Patient has reviewed and agreed to the above plan.      REGINE Lopez on 11/5/2024 at 1:07 PM

## 2024-12-31 ENCOUNTER — VIRTUAL VISIT (OUTPATIENT)
Dept: BEHAVIORAL HEALTH | Facility: CLINIC | Age: 34
End: 2024-12-31
Payer: COMMERCIAL

## 2024-12-31 DIAGNOSIS — F41.1 GENERALIZED ANXIETY DISORDER: ICD-10-CM

## 2024-12-31 DIAGNOSIS — F33.41 RECURRENT MAJOR DEPRESSIVE DISORDER, IN PARTIAL REMISSION (H): Primary | ICD-10-CM

## 2024-12-31 PROCEDURE — 90834 PSYTX W PT 45 MINUTES: CPT | Mod: 95

## 2024-12-31 NOTE — PROGRESS NOTES
[facility]                                     Progress Note    Patient Name: Lanie Martin  Date: 24           Service Type: Individual      Session Start Time: 3.02  Session End Time: 3.40     Session Length: 38-52 minutes     Session #: 10    Attendees: Client attended alone    Service Modality:  Video Visit:      Provider verified identity through the following two step process.  Patient provided:  Patient  and Patient address    Telemedicine Visit: The patient's condition can be safely assessed and treated via synchronous audio and visual telemedicine encounter.      Reason for Telemedicine Visit: Patient has requested telehealth visit    Originating Site (Patient Location): Patient's place of employment    Distant Site (Provider Location): [facility]    Consent:  The patient/guardian has verbally consented to: the potential risks and benefits of telemedicine (video visit) versus in person care; bill my insurance or make self-payment for services provided; and responsibility for payment of non-covered services.     Patient would like the video invitation sent by:  My Chart    Mode of Communication:  Video Conference via [employer]well    Distant Location (Provider):  Off-site    As the provider I attest to compliance with applicable laws and regulations related to telemedicine.    DATA  Interactive Complexity: No  Crisis: No         Progress Since Last Session (Related to Symptoms / Goals / Homework):   Symptoms: Improving . Patient's PHQ9 and GAD7 scores have decreased overall.     Homework: Partially completed      Episode of Care Goals: Satisfactory progress - PREPARATION (Decided to change - considering how); Intervened by negotiating a change plan and determining options / strategies for behavior change, identifying triggers, exploring social supports, and working towards setting a date to begin behavior change     Current /  Ongoing Stressors and Concerns:  Patient discussed occupational stressors and applying for a new job. Patient discussed stressful relationship dynamics with her . Patient discussed intimacy dynamics. Patient discussed thoughts about the dissolution of her marriage. Patient discussed financial stressors and focusing on paying off her credit card debt. Patient discussed the holidays. Patient discussed having some manageable alcohol cravings during Thu, but not acting on them. Patient discussed getting a kitten for her sons. Patient discussed her son's behavioral challenges.        Treatment Objective(s) Addressed in This Session:   Increase interest, engagement, and pleasure in doing things  Decrease frequency and intensity of feeling anxious, nervous, worried       Intervention:   Motivational Interviewing    MI Intervention: Expressed Empathy/Understanding, Supported Autonomy, Collaboration, Evocation, Open-ended questions, and Reflections: simple and complex     Change Talk Expressed by the Patient: Desire to change    Provider Response to Change Talk: E - Evoked more info from patient about behavior change, A - Affirmed patient's thoughts, decisions, or attempts at behavior change, and R - Reflected patient's change talk    Assessments completed prior to visit:  The following assessments were completed by patient for this visit:  PHQ9:       9/9/2024     2:46 PM 10/9/2024    12:16 PM 10/21/2024     7:33 PM 11/4/2024     9:31 AM 11/19/2024     1:56 PM 12/10/2024     5:43 AM 12/31/2024     2:44 PM   PHQ-9 SCORE   PHQ[facility]9 Total Score [facility] 2 (Minimal depression) 3 (Minimal depression) 9 (Mild depression) 3 (Minimal depression) 8 (Mild depression) 7 (Mild depression) 7 (Mild depression)   PHQ-9 Total Score 2 3 9 3  8  7  7        Patient-reported     GAD7:       3/5/2021    11:14 AM 5/10/2022     7:54 AM 8/28/2022     1:11 PM 12/20/2023     3:25 PM 1/24/2024     8:15 AM 7/22/2024    10:49 AM 11/4/2024      9:31 AM   TRINITY-7 SCORE   Total Score   4 (minimal anxiety) 5 (mild anxiety)  8 (mild anxiety) 4 (minimal anxiety)   Total Score 2 3 4 5 4 8 4        Patient-reported     PROMIS 10-Global Health (all questions and answers displayed):       2/23/2024     1:46 PM 6/10/2024    11:56 AM 7/18/2024     8:01 AM 10/21/2024     7:34 PM 11/5/2024    12:49 PM 11/19/2024     1:58 PM 12/10/2024     5:45 AM   PROMIS 10   In general, would you say your health is:   Good Fair Good Fair    In general, would you say your quality of life is:   Fair Fair Good Good    In general, how would you rate your physical health?   Good Fair Fair Good    In general, how would you rate your mental health, including your mood and your ability to think?   Poor Fair Fair Fair    In general, how would you rate your satisfaction with your social activities and relationships?   Good Poor Poor Fair    In general, please rate how well you carry out your usual social activities and roles   Fair Poor Good Good    To what extent are you able to carry out your everyday physical activities such as walking, climbing stairs, carrying groceries, or moving a chair?   Completely Completely Completely Completely    In the past 7 days, how often have you been bothered by emotional problems such as feeling anxious, depressed, or irritable?   Sometimes Sometimes Sometimes Often    In the past 7 days, how would you rate your fatigue on average?   Mild Mild Mild Moderate    In the past 7 days, how would you rate your pain on average, where 0 means no pain, and 10 means worst imaginable pain?   0 0 0 0    In general, would you say your health is:   3 2 3 2    In general, would you say your quality of life is:   2 2 3 3    In general, how would you rate your physical health?   3 2 2 3    In general, how would you rate your mental health, including your mood and your ability to think?   1 2 2 2    In general, how would you rate your satisfaction with your social activities  "and relationships?   3 1 1 2    In general, please rate how well you carry out your usual social activities and roles. (This includes activities at home, at work and in your community, and responsibilities as a parent, child, spouse, employee, friend, etc.)   2 1 3 3    To what extent are you able to carry out your everyday physical activities such as walking, climbing stairs, carrying groceries, or moving a chair?   5 5 5 5    In the past 7 days, how often have you been bothered by emotional problems such as feeling anxious, depressed, or irritable?   3 3 3 4    In the past 7 days, how would you rate your fatigue on average?   2 2 2 3    In the past 7 days, how would you rate your pain on average, where 0 means no pain, and 10 means worst imaginable pain?   0 0 0 0    Global Mental Health Score   9 8 9  9     Global Physical Health Score   17 16 16  16     PROMIS TOTAL - SUBSCORES   26 24 25  25         Information is confidential and restricted. Go to Review Flowsheets to unlock data.    Patient-reported     [place] Suicide Severity Rating Scale (Lifetime/Recent)      2/3/2019     3:50 AM 1/20/2021     7:53 PM 2/20/2024    10:00 AM 7/25/2024    11:54 AM   [place] Suicide Severity Rating (Lifetime/Recent)   Q1 Wished to be Dead (Past Month) no no     Q2 Suicidal Thoughts (Past Month) no no     Q3 Suicidal Thought Method  no     Q4 Suicidal Intent without Specific Plan  no     Q5 Suicide Intent with Specific Plan  no     Q6 Suicide Behavior (Lifetime) no no     Q1 Wish to be Dead (Lifetime)   Y N   Wish to be Dead Description (Lifetime)   The patient denied having any history of suicide attempts.    1. Wish to be Dead (Past 1 Month)   Y    Wish to be Dead Description (Past 1 Month)   The patient reported she had ONLY had passive suicide ideation, with no plan or intent to harm herself.  The thoughts had been along the lines of \"thongs would be easier if she were not around\".    Q2 Non-Specific Active Suicidal " Thoughts (Lifetime)   Y N   Non-Specific Active Suicidal Thought Description (Lifetime)   See above    2. Non-Specific Active Suicidal Thoughts (Past 1 Month)   Y    Non-Specific Active Suicidal Thought Description (Past 1 Month)   See above    3. Active Suicidal Ideation with any Methods (Not Plan) Without Intent to Act (Lifetime)   N    Active Suicidal Ideation with any Methods (Not Plan) Description (Lifetime)   NA    Q3 Active Suicidal Ideation with any Methods (Not Plan) Without Intent to Act (Past 1 Month)   N    Q4 Active Suicidal Ideation with Some Intent to Act, Without Specific Plan (Lifetime)   N    Active Suicidal Ideation with Some Intent to Act, Without Specific Plan Description (Lifetime)   NA    4. Active Suicidal Ideation with Some Intent to Act, Without Specific Plan (Past 1 Month)   N    Q5 Active Suicidal Ideation with Specific Plan and Intent (Lifetime)   N    Active Suicidal Ideation with Specific Plan and Intent Description (Lifetime)   NA    5. Active Suicidal Ideation with Specific Plan and Intent (Past 1 Month)   N    Most Severe Ideation Rating (Lifetime)   3    Description of Most Severe Ideation (Lifetime)   See abobe    Most Severe Ideation Rating (Past 1 Month)   2    Description of Most Severe Ideation (Past 1 Month)   See above    Frequency (Lifetime)   1    Frequency (Past 1 Month)   1    Duration (Lifetime)   3    Duration (Past 1 Month)   3    Controllability (Lifetime)   3    Controllability (Past 1 Month)   3    Deterrents (Lifetime)   1    Deterrents (Past 1 Month)   1    Reasons for Ideation (Lifetime)   5    Reasons for Ideation (Past 1 Month)   5    Actual Attempt (Lifetime)   N    Has subject engaged in non-suicidal self-injurious behavior? (Lifetime)   N    Interrupted Attempts (Lifetime)   N    Aborted or Self-Interrupted Attempt (Lifetime)   N    Preparatory Acts or Behavior (Lifetime)   N    Calculated C-SSRS Risk Score (Lifetime/Recent)   Low Risk          ASSESSMENT:  Current Emotional / Mental Status (status of significant symptoms):   Risk status (Self / Other harm or suicidal ideation)   Patient denies current fears or concerns for personal safety.   Patient reports the following current or recent suicidal ideation or behaviors: Patient reports some passive SI, but denies any plan or intent to act on them. Patient denied the need to create a safety plan and agreed to reach out to crisis resources or call [phone] if anything changes before next session.    Patient denies current or recent homicidal ideation or behaviors.   Patient denies current or recent self injurious behavior or ideation.   Patient denies other safety concerns.   Patient reports there has been no change in risk factors since their last session.     Patient reports there has been no change in protective factors since their last session.     Recommended that patient call 911 or go to the local ED should there be a change in any of these risk factors     Appearance:   Appropriate    Eye Contact:   Good    Psychomotor Behavior: Normal    Attitude:   Cooperative  Pleasant   Orientation:   All   Speech    Rate / Production: Normal/ Responsive Talkative    Volume:  Normal    Mood:    Anxious  Irritable    Affect:    Appropriate    Thought Content:  Clear    Thought Form:  Coherent  Logical    Insight:    Fair      Medication Review:   No changes to current psychiatric medication(s)     Medication Compliance:   Yes     Changes in Health Issues:   None reported     Chemical Use Review:   Substance Use: Chemical use reviewed, no active concerns identified .  However, patient's daily cannabis use will continue to be evaluated for Cannabis Use Disorder. Patient reported successfully completing chemical dependency treatment at "[facility] and has reportedly been sober from alcohol use since February 2024.      Tobacco Use: No current tobacco use.      Diagnosis:  1. Recurrent major depressive disorder, in  partial remission (H)    2. Generalized anxiety disorder    Other Diagnoses that is relevant to services: Alcohol Use Disorder, currently in partial remission   Provisional Diagnoses in the process of being ruled-out: ADHD & PTSD      Collateral Reports Completed:   Not Applicable    PLAN: (Patient Tasks / Therapist Tasks / Other)  Patient will return in 2 weeks for next session. Patient will engage in self-care activities. Patient will work on mindfulness skills to increase awareness of triggers to anxiety, irritability, and depression. Patient will work on open and direct communication skills with her . Patient will work on positive and negative reinforcement of her 's behaviors. Patient will work on setting clear boundaries and expectations with her spouse. Patient will consider increasing to 3 consistent meals throughout the day. Patient will continue abstaining from alcohol use.       Melanie Lei, [facility]                                                         ______________________________________________________________________    Individual Treatment Plan    Patient's Name: Lanie Martin  YOB: 1990    Date of Creation: 8/1/24  Date Treatment Plan Last Reviewed/Revised: 11/5/24    DSM5 Diagnoses:   Encounter Diagnoses   Name Primary?    Recurrent major depressive disorder, in partial remission (H) Yes    Generalized anxiety disorder        Psychosocial / Contextual Factors: Substance use, relationship stressors, occupational dynamics.  PROMIS (reviewed every 90 days):   PROMIS-10 from encounters over the past 365 days     11/5/24 10/22/24 7/25/24   Global Mental Health Score (P)  9 (P) 8 (P) 9   Global Physical Health Score (P)  16 (P) 16 (P) 17   PROMIS TOTAL - SUBSCORES (P)  25 (P) 24 (P) 26       Referral / Collaboration:  Referral to another professional/service is not indicated at this time..    Anticipated number of session for this episode of care: 9-12  sessions  Anticipation frequency of session: Biweekly  Anticipated Duration of each session: 38-52 minutes  Treatment plan will be reviewed in 90 days or when goals have been changed.       MeasurableTreatment Goal(s) related to diagnosis / functional impairment(s)  Goal 1: Patient will reduce depression symptoms as evidenced by a reduction in their PHQ9 score to a 2 or less in the next 2 months.     I will know I've met my goal when I'm smiling more.      Objective #A (Patient Action)    Patient will Decrease frequency and intensity of feeling down, depressed, hopeless.  Status: Continued - Date(s): 11/5/24     Intervention(s)  Therapist will teach  distress tolerance and coping skills .    Objective #B  Patient will Increase interest, engagement, and pleasure in doing things.  Status: Continued - Date(s): 11/5/24     Intervention(s)  Therapist will  teach self-care skills .    Objective #C  Patient will Improve diet, appetite, mindful eating, and / or meal planning.  Status: Continued - Date(s): 11/5/24     Intervention(s)  Therapist will  provide psycho-education on the impact and effects of nutrition on mental health.  .        Goal 2: Patient will reduce anxiety symptoms as evidenced by a reduction in their GAD7 score to a 4 or less in the next 2 months.     I will know I've met my goal when I don't panic when someone walks in for their shift & I'm not stressing out when my  sighs.      Objective #A (Patient Action)    Status: Continued - Date(s): 11/5/24     Patient will identify at least 2 triggers for anxiety.    Intervention(s)  Therapist will  teach mindfulness skills .    Objective #B  Patient will use thought-stopping strategy daily to reduce intrusive thoughts.    Status: Continued - Date(s): 11/5/24     Intervention(s)  Therapist will  teach thought-stopping and thought-challenging techniques .        Patient has reviewed and agreed to the above plan.      REGINE Lopez on 11/5/2024 at  1:07 PM

## 2025-01-13 ENCOUNTER — VIRTUAL VISIT (OUTPATIENT)
Dept: BEHAVIORAL HEALTH | Facility: CLINIC | Age: 35
End: 2025-01-13
Payer: COMMERCIAL

## 2025-01-13 DIAGNOSIS — F33.41 RECURRENT MAJOR DEPRESSIVE DISORDER, IN PARTIAL REMISSION: Primary | ICD-10-CM

## 2025-01-13 DIAGNOSIS — F41.1 GENERALIZED ANXIETY DISORDER: ICD-10-CM

## 2025-01-13 PROCEDURE — 90834 PSYTX W PT 45 MINUTES: CPT | Mod: 95

## 2025-01-13 ASSESSMENT — ANXIETY QUESTIONNAIRES
1. FEELING NERVOUS, ANXIOUS, OR ON EDGE: SEVERAL DAYS
IF YOU CHECKED OFF ANY PROBLEMS ON THIS QUESTIONNAIRE, HOW DIFFICULT HAVE THESE PROBLEMS MADE IT FOR YOU TO DO YOUR WORK, TAKE CARE OF THINGS AT HOME, OR GET ALONG WITH OTHER PEOPLE: SOMEWHAT DIFFICULT
7. FEELING AFRAID AS IF SOMETHING AWFUL MIGHT HAPPEN: NOT AT ALL
7. FEELING AFRAID AS IF SOMETHING AWFUL MIGHT HAPPEN: NOT AT ALL
5. BEING SO RESTLESS THAT IT IS HARD TO SIT STILL: SEVERAL DAYS
3. WORRYING TOO MUCH ABOUT DIFFERENT THINGS: MORE THAN HALF THE DAYS
GAD7 TOTAL SCORE: 7
4. TROUBLE RELAXING: SEVERAL DAYS
8. IF YOU CHECKED OFF ANY PROBLEMS, HOW DIFFICULT HAVE THESE MADE IT FOR YOU TO DO YOUR WORK, TAKE CARE OF THINGS AT HOME, OR GET ALONG WITH OTHER PEOPLE?: SOMEWHAT DIFFICULT
GAD7 TOTAL SCORE: 7
2. NOT BEING ABLE TO STOP OR CONTROL WORRYING: SEVERAL DAYS
6. BECOMING EASILY ANNOYED OR IRRITABLE: SEVERAL DAYS
GAD7 TOTAL SCORE: 7

## 2025-01-13 ASSESSMENT — PATIENT HEALTH QUESTIONNAIRE - PHQ9
10. IF YOU CHECKED OFF ANY PROBLEMS, HOW DIFFICULT HAVE THESE PROBLEMS MADE IT FOR YOU TO DO YOUR WORK, TAKE CARE OF THINGS AT HOME, OR GET ALONG WITH OTHER PEOPLE: NOT DIFFICULT AT ALL
SUM OF ALL RESPONSES TO PHQ QUESTIONS 1-9: 4
SUM OF ALL RESPONSES TO PHQ QUESTIONS 1-9: 4

## 2025-01-13 NOTE — PROGRESS NOTES
[facility]                                     Progress Note    Patient Name: Lanie Martin  Date: 25           Service Type: Individual      Session Start Time: 2.00  Session End Time: 2.39     Session Length: 38-52 minutes     Session #: 11    Attendees: Client attended alone    Service Modality:  Video Visit:      Provider verified identity through the following two step process.  Patient provided:  Patient  and Patient address    Telemedicine Visit: The patient's condition can be safely assessed and treated via synchronous audio and visual telemedicine encounter.      Reason for Telemedicine Visit: Patient has requested telehealth visit    Originating Site (Patient Location): Patient's place of employment    Distant Site (Provider Location): [facility]    Consent:  The patient/guardian has verbally consented to: the potential risks and benefits of telemedicine (video visit) versus in person care; bill my insurance or make self-payment for services provided; and responsibility for payment of non-covered services.     Patient would like the video invitation sent by:  My Chart    Mode of Communication:  Video Conference via [employer]well    Distant Location (Provider):  Off-site    As the provider I attest to compliance with applicable laws and regulations related to telemedicine.    DATA  Interactive Complexity: No  Crisis: No         Progress Since Last Session (Related to Symptoms / Goals / Homework):   Symptoms:  Mixed. Patient's PHQ9 score has decreased and patient's GAD7 score has increased.     Homework: Partially completed      Episode of Care Goals: Satisfactory progress - PREPARATION (Decided to change - considering how); Intervened by negotiating a change plan and determining options / strategies for behavior change, identifying triggers, exploring social supports, and working towards setting a date to begin behavior  change     Current / Ongoing Stressors and Concerns:  Patient reports putting in her 2 weeks notice at her current job. Patient discussed relationship dynamics with her supervisor. Patient discussed preparing to start her new job. Patient discussed car troubles. Patient discussed behavioral stressors with her sons. Patient discussed challenging relationship dynamics with her , Bernabe. Patient discussed hobbies. Patient discussed hosting a birthday party for her youngest son next week.        Treatment Objective(s) Addressed in This Session:   Increase interest, engagement, and pleasure in doing things  Decrease frequency and intensity of feeling anxious, nervous, worried       Intervention:   Motivational Interviewing    MI Intervention: Expressed Empathy/Understanding, Supported Autonomy, Collaboration, Evocation, Open-ended questions, and Reflections: simple and complex     Change Talk Expressed by the Patient: Desire to change    Provider Response to Change Talk: E - Evoked more info from patient about behavior change, A - Affirmed patient's thoughts, decisions, or attempts at behavior change, and R - Reflected patient's change talk    Assessments completed prior to visit:  The following assessments were completed by patient for this visit:  PHQ9:       10/9/2024    12:16 PM 10/21/2024     7:33 PM 11/4/2024     9:31 AM 11/19/2024     1:56 PM 12/10/2024     5:43 AM 12/31/2024     2:44 PM 1/13/2025     1:35 PM   PHQ-9 SCORE   PHQ-9 Total Score MyChart 3 (Minimal depression) 9 (Mild depression) 3 (Minimal depression) 8 (Mild depression) 7 (Mild depression) 7 (Mild depression) 4 (Minimal depression)   PHQ-9 Total Score 3 9 3  8  7  7  4        Patient-reported     GAD7:       5/10/2022     7:54 AM 8/28/2022     1:11 PM 12/20/2023     3:25 PM 1/24/2024     8:15 AM 7/22/2024    10:49 AM 11/4/2024     9:31 AM 1/13/2025     1:35 PM   TRINITY-7 SCORE   Total Score  4 (minimal anxiety) 5 (mild anxiety)  8 (mild anxiety)  4 (minimal anxiety) 7 (mild anxiety)   Total Score 3 4 5 4 8 4  7        Patient-reported     PROMIS 10-Global Health (all questions and answers displayed):       2/23/2024     1:46 PM 6/10/2024    11:56 AM 7/18/2024     8:01 AM 10/21/2024     7:34 PM 11/5/2024    12:49 PM 11/19/2024     1:58 PM 12/10/2024     5:45 AM   PROMIS 10   In general, would you say your health is:   Good Fair Good Fair    In general, would you say your quality of life is:   Fair Fair Good Good    In general, how would you rate your physical health?   Good Fair Fair Good    In general, how would you rate your mental health, including your mood and your ability to think?   Poor Fair Fair Fair    In general, how would you rate your satisfaction with your social activities and relationships?   Good Poor Poor Fair    In general, please rate how well you carry out your usual social activities and roles   Fair Poor Good Good    To what extent are you able to carry out your everyday physical activities such as walking, climbing stairs, carrying groceries, or moving a chair?   Completely Completely Completely Completely    In the past 7 days, how often have you been bothered by emotional problems such as feeling anxious, depressed, or irritable?   Sometimes Sometimes Sometimes Often    In the past 7 days, how would you rate your fatigue on average?   Mild Mild Mild Moderate    In the past 7 days, how would you rate your pain on average, where 0 means no pain, and 10 means worst imaginable pain?   0 0 0 0    In general, would you say your health is:   3 2 3 2    In general, would you say your quality of life is:   2 2 3 3    In general, how would you rate your physical health?   3 2 2 3    In general, how would you rate your mental health, including your mood and your ability to think?   1 2 2 2    In general, how would you rate your satisfaction with your social activities and relationships?   3 1 1 2    In general, please rate how well you carry  "out your usual social activities and roles. (This includes activities at home, at work and in your community, and responsibilities as a parent, child, spouse, employee, friend, etc.)   2 1 3 3    To what extent are you able to carry out your everyday physical activities such as walking, climbing stairs, carrying groceries, or moving a chair?   5 5 5 5    In the past 7 days, how often have you been bothered by emotional problems such as feeling anxious, depressed, or irritable?   3 3 3 4    In the past 7 days, how would you rate your fatigue on average?   2 2 2 3    In the past 7 days, how would you rate your pain on average, where 0 means no pain, and 10 means worst imaginable pain?   0 0 0 0    Global Mental Health Score   9 8 9  9     Global Physical Health Score   17 16 16  16     PROMIS TOTAL - SUBSCORES   26 24 25  25         Information is confidential and restricted. Go to Review Flowsheets to unlock data.    Patient-reported     [place] Suicide Severity Rating Scale (Lifetime/Recent)      2/3/2019     3:50 AM 1/20/2021     7:53 PM 2/20/2024    10:00 AM 7/25/2024    11:54 AM   [place] Suicide Severity Rating (Lifetime/Recent)   Q1 Wished to be Dead (Past Month) no no     Q2 Suicidal Thoughts (Past Month) no no     Q3 Suicidal Thought Method  no     Q4 Suicidal Intent without Specific Plan  no     Q5 Suicide Intent with Specific Plan  no     Q6 Suicide Behavior (Lifetime) no no     Q1 Wish to be Dead (Lifetime)   Y N   Wish to be Dead Description (Lifetime)   The patient denied having any history of suicide attempts.    1. Wish to be Dead (Past 1 Month)   Y    Wish to be Dead Description (Past 1 Month)   The patient reported she had ONLY had passive suicide ideation, with no plan or intent to harm herself.  The thoughts had been along the lines of \"thongs would be easier if she were not around\".    Q2 Non-Specific Active Suicidal Thoughts (Lifetime)   Y N   Non-Specific Active Suicidal Thought Description " (Lifetime)   See above    2. Non-Specific Active Suicidal Thoughts (Past 1 Month)   Y    Non-Specific Active Suicidal Thought Description (Past 1 Month)   See above    3. Active Suicidal Ideation with any Methods (Not Plan) Without Intent to Act (Lifetime)   N    Active Suicidal Ideation with any Methods (Not Plan) Description (Lifetime)   NA    Q3 Active Suicidal Ideation with any Methods (Not Plan) Without Intent to Act (Past 1 Month)   N    Q4 Active Suicidal Ideation with Some Intent to Act, Without Specific Plan (Lifetime)   N    Active Suicidal Ideation with Some Intent to Act, Without Specific Plan Description (Lifetime)   NA    4. Active Suicidal Ideation with Some Intent to Act, Without Specific Plan (Past 1 Month)   N    Q5 Active Suicidal Ideation with Specific Plan and Intent (Lifetime)   N    Active Suicidal Ideation with Specific Plan and Intent Description (Lifetime)   NA    5. Active Suicidal Ideation with Specific Plan and Intent (Past 1 Month)   N    Most Severe Ideation Rating (Lifetime)   3    Description of Most Severe Ideation (Lifetime)   See abobe    Most Severe Ideation Rating (Past 1 Month)   2    Description of Most Severe Ideation (Past 1 Month)   See above    Frequency (Lifetime)   1    Frequency (Past 1 Month)   1    Duration (Lifetime)   3    Duration (Past 1 Month)   3    Controllability (Lifetime)   3    Controllability (Past 1 Month)   3    Deterrents (Lifetime)   1    Deterrents (Past 1 Month)   1    Reasons for Ideation (Lifetime)   5    Reasons for Ideation (Past 1 Month)   5    Actual Attempt (Lifetime)   N    Has subject engaged in non-suicidal self-injurious behavior? (Lifetime)   N    Interrupted Attempts (Lifetime)   N    Aborted or Self-Interrupted Attempt (Lifetime)   N    Preparatory Acts or Behavior (Lifetime)   N    Calculated C-SSRS Risk Score (Lifetime/Recent)   Low Risk          ASSESSMENT: Current Emotional / Mental Status (status of significant symptoms):   Risk  status (Self / Other harm or suicidal ideation)   Patient denies current fears or concerns for personal safety.   Patient denies current or recent suicidal ideation or behaviors.    Patient denies current or recent homicidal ideation or behaviors.   Patient denies current or recent self injurious behavior or ideation.   Patient denies other safety concerns.   Patient reports there has been no change in risk factors since their last session.     Patient reports there has been no change in protective factors since their last session.     Recommended that patient call 911 or go to the local ED should there be a change in any of these risk factors     Appearance:   Appropriate    Eye Contact:   Good    Psychomotor Behavior: Normal    Attitude:   Cooperative  Pleasant   Orientation:   All   Speech    Rate / Production: Normal/ Responsive Talkative    Volume:  Normal    Mood:    Anxious    Affect:    Appropriate    Thought Content:  Clear    Thought Form:  Coherent  Logical    Insight:    Fair      Medication Review:   No changes to current psychiatric medication(s)     Medication Compliance:   Yes     Changes in Health Issues:   None reported     Chemical Use Review:   Substance Use: Chemical use reviewed, no active concerns identified .  However, patient's daily cannabis use will continue to be evaluated for Cannabis Use Disorder. Patient reported successfully completing chemical dependency treatment at [facility] and has reportedly been sober from alcohol use since February 2024.      Tobacco Use: No current tobacco use.      Diagnosis:  1. Recurrent major depressive disorder, in partial remission    2. Generalized anxiety disorder    Other Diagnoses that is relevant to services: Alcohol Use Disorder, currently in partial remission   Provisional Diagnoses in the process of being ruled-out: ADHD & PTSD      Collateral Reports Completed:   Not Applicable    PLAN: (Patient Tasks / Therapist Tasks /  Other)  Patient will return in 3 weeks for next session. Patient will engage in self-care activities. Patient will work on mindfulness skills to increase awareness of triggers to anxiety and depression. Patient will work on open and direct communication skills with her . Patient will work on positive and negative reinforcement of her 's behaviors. Patient will work on setting clear boundaries and expectations with her spouse. Patient will consider increasing to 3 consistent meals throughout the day. Patient will continue abstaining from alcohol use.      Melanie Lei, [facility]                                                         ______________________________________________________________________    Individual Treatment Plan    Patient's Name: Lanie Martin  YOB: 1990    Date of Creation: 8/1/24  Date Treatment Plan Last Reviewed/Revised: 11/5/24    DSM5 Diagnoses:   Encounter Diagnoses   Name Primary?    Recurrent major depressive disorder, in partial remission Yes    Generalized anxiety disorder        Psychosocial / Contextual Factors: Substance use, relationship stressors, occupational dynamics.  PROMIS (reviewed every 90 days):   PROMIS-10 from encounters over the past 365 days     11/5/24 10/22/24 7/25/24   Global Mental Health Score (P)  9 (P) 8 (P) 9   Global Physical Health Score (P)  16 (P) 16 (P) 17   PROMIS TOTAL - SUBSCORES (P)  25 (P) 24 (P) 26       Referral / Collaboration:  Referral to another professional/service is not indicated at this time..    Anticipated number of session for this episode of care: 9-12 sessions  Anticipation frequency of session: Biweekly  Anticipated Duration of each session: 38-52 minutes  Treatment plan will be reviewed in 90 days or when goals have been changed.       MeasurableTreatment Goal(s) related to diagnosis / functional impairment(s)  Goal 1: Patient will reduce depression symptoms as evidenced by a reduction in their PHQ9 score  to a 2 or less in the next 2 months.     I will know I've met my goal when I'm smiling more.      Objective #A (Patient Action)    Patient will Decrease frequency and intensity of feeling down, depressed, hopeless.  Status: Continued - Date(s): 11/5/24     Intervention(s)  Therapist will teach  distress tolerance and coping skills .    Objective #B  Patient will Increase interest, engagement, and pleasure in doing things.  Status: Continued - Date(s): 11/5/24     Intervention(s)  Therapist will  teach self-care skills .    Objective #C  Patient will Improve diet, appetite, mindful eating, and / or meal planning.  Status: Continued - Date(s): 11/5/24     Intervention(s)  Therapist will  provide psycho-education on the impact and effects of nutrition on mental health.  .        Goal 2: Patient will reduce anxiety symptoms as evidenced by a reduction in their GAD7 score to a 4 or less in the next 2 months.     I will know I've met my goal when I don't panic when someone walks in for their shift & I'm not stressing out when my  sighs.      Objective #A (Patient Action)    Status: Continued - Date(s): 11/5/24     Patient will identify at least 2 triggers for anxiety.    Intervention(s)  Therapist will  teach mindfulness skills .    Objective #B  Patient will use thought-stopping strategy daily to reduce intrusive thoughts.    Status: Continued - Date(s): 11/5/24     Intervention(s)  Therapist will  teach thought-stopping and thought-challenging techniques .        Patient has reviewed and agreed to the above plan.      Melanie Lei [facility] CenterGIANLUCA on 11/5/2024 at 1:07 PM

## 2025-02-27 ENCOUNTER — VIRTUAL VISIT (OUTPATIENT)
Dept: BEHAVIORAL HEALTH | Facility: CLINIC | Age: 35
End: 2025-02-27
Payer: COMMERCIAL

## 2025-02-27 DIAGNOSIS — F33.41 RECURRENT MAJOR DEPRESSIVE DISORDER, IN PARTIAL REMISSION: Primary | ICD-10-CM

## 2025-02-27 DIAGNOSIS — F41.1 GENERALIZED ANXIETY DISORDER: ICD-10-CM

## 2025-02-27 ASSESSMENT — COLUMBIA-SUICIDE SEVERITY RATING SCALE - C-SSRS
2. HAVE YOU ACTUALLY HAD ANY THOUGHTS OF KILLING YOURSELF?: NO
1. SINCE LAST CONTACT, HAVE YOU WISHED YOU WERE DEAD OR WISHED YOU COULD GO TO SLEEP AND NOT WAKE UP?: NO

## 2025-02-27 ASSESSMENT — PATIENT HEALTH QUESTIONNAIRE - PHQ9
SUM OF ALL RESPONSES TO PHQ QUESTIONS 1-9: 9
SUM OF ALL RESPONSES TO PHQ QUESTIONS 1-9: 9
10. IF YOU CHECKED OFF ANY PROBLEMS, HOW DIFFICULT HAVE THESE PROBLEMS MADE IT FOR YOU TO DO YOUR WORK, TAKE CARE OF THINGS AT HOME, OR GET ALONG WITH OTHER PEOPLE: VERY DIFFICULT

## 2025-02-27 NOTE — PROGRESS NOTES
[facility]                                     Progress Note    Patient Name: Lanie Martin  Date: 25           Service Type: Individual      Session Start Time:  8.00  Session End Time: 8.41     Session Length: 38-52 minutes     Session #: 12    Attendees: Client attended alone    Service Modality:  Video Visit:      Provider verified identity through the following two step process.  Patient provided:  Patient  and Patient address    Telemedicine Visit: The patient's condition can be safely assessed and treated via synchronous audio and visual telemedicine encounter.      Reason for Telemedicine Visit: Patient has requested telehealth visit    Originating Site (Patient Location): Patient's home    Distant Site (Provider Location): [facility]    Consent:  The patient/guardian has verbally consented to: the potential risks and benefits of telemedicine (video visit) versus in person care; bill my insurance or make self-payment for services provided; and responsibility for payment of non-covered services.     Patient would like the video invitation sent by:  My Chart    Mode of Communication:  Video Conference via Amwell    Distant Location (Provider):  On-site    As the provider I attest to compliance with applicable laws and regulations related to telemedicine.    DATA  Interactive Complexity: No  Crisis: No         Progress Since Last Session (Related to Symptoms / Goals / Homework):   Symptoms: Worsening . Patient's GAD7 and PHQ9 scores have increased.    Homework: Partially completed      Episode of Care Goals: Satisfactory progress - ACTION (Actively working towards change); Intervened by reinforcing change plan / affirming steps taken     Current / Ongoing Stressors and Concerns:  Patient discussed workplace stressors at her new job. Patient discussed financial stressors. Patient discussed behavioral struggles with her two  young sons. Patient discussed health issues with her cats. Patient discussed car troubles. Patient discussed relationship dynamics with her , Bernabe. Patient discussed possible career changes.        Treatment Objective(s) Addressed in This Session:   Increase interest, engagement, and pleasure in doing things  Decrease frequency and intensity of feeling anxious, nervous, worried       Intervention:   Motivational Interviewing    MI Intervention: Expressed Empathy/Understanding, Supported Autonomy, Collaboration, Evocation, Open-ended questions, and Reflections: simple and complex     Change Talk Expressed by the Patient: Desire to change    Provider Response to Change Talk: E - Evoked more info from patient about behavior change, A - Affirmed patient's thoughts, decisions, or attempts at behavior change, and R - Reflected patient's change talk    Assessments completed prior to visit:  The following assessments were completed by patient for this visit:  PHQ9:       10/21/2024     7:33 PM 11/4/2024     9:31 AM 11/19/2024     1:56 PM 12/10/2024     5:43 AM 12/31/2024     2:44 PM 1/13/2025     1:35 PM 2/27/2025     7:36 AM   PHQ-9 SCORE   PHQ-9 Total Score MyChart 9 (Mild depression) 3 (Minimal depression) 8 (Mild depression) 7 (Mild depression) 7 (Mild depression) 4 (Minimal depression) 9 (Mild depression)   PHQ-9 Total Score 9 3  8  7  7  4  9        Patient-reported     GAD7:       5/10/2022     7:54 AM 8/28/2022     1:11 PM 12/20/2023     3:25 PM 1/24/2024     8:15 AM 7/22/2024    10:49 AM 11/4/2024     9:31 AM 1/13/2025     1:35 PM   TRINITY-7 SCORE   Total Score  4 (minimal anxiety) 5 (mild anxiety)  8 (mild anxiety) 4 (minimal anxiety) 7 (mild anxiety)   Total Score 3 4 5 4 8 4  7        Patient-reported     PROMIS 10-Global Health (all questions and answers displayed):       2/23/2024     1:46 PM 6/10/2024    11:56 AM 7/18/2024     8:01 AM 10/21/2024     7:34 PM 11/5/2024    12:49 PM 11/19/2024     1:58 PM  12/10/2024     5:45 AM   PROMIS 10   In general, would you say your health is:   Good Fair Good Fair    In general, would you say your quality of life is:   Fair Fair Good Good    In general, how would you rate your physical health?   Good Fair Fair Good    In general, how would you rate your mental health, including your mood and your ability to think?   Poor Fair Fair Fair    In general, how would you rate your satisfaction with your social activities and relationships?   Good Poor Poor Fair    In general, please rate how well you carry out your usual social activities and roles   Fair Poor Good Good    To what extent are you able to carry out your everyday physical activities such as walking, climbing stairs, carrying groceries, or moving a chair?   Completely Completely Completely Completely    In the past 7 days, how often have you been bothered by emotional problems such as feeling anxious, depressed, or irritable?   Sometimes Sometimes Sometimes Often    In the past 7 days, how would you rate your fatigue on average?   Mild Mild Mild Moderate    In the past 7 days, how would you rate your pain on average, where 0 means no pain, and 10 means worst imaginable pain?   0 0 0 0    In general, would you say your health is:   3 2 3 2    In general, would you say your quality of life is:   2 2 3 3    In general, how would you rate your physical health?   3 2 2 3    In general, how would you rate your mental health, including your mood and your ability to think?   1 2 2 2    In general, how would you rate your satisfaction with your social activities and relationships?   3 1 1 2    In general, please rate how well you carry out your usual social activities and roles. (This includes activities at home, at work and in your community, and responsibilities as a parent, child, spouse, employee, friend, etc.)   2 1 3 3    To what extent are you able to carry out your everyday physical activities such as walking, climbing  "stairs, carrying groceries, or moving a chair?   5 5 5 5    In the past 7 days, how often have you been bothered by emotional problems such as feeling anxious, depressed, or irritable?   3 3 3 4    In the past 7 days, how would you rate your fatigue on average?   2 2 2 3    In the past 7 days, how would you rate your pain on average, where 0 means no pain, and 10 means worst imaginable pain?   0 0 0 0    Global Mental Health Score   9 8 9  9     Global Physical Health Score   17 16 16  16     PROMIS TOTAL - SUBSCORES   26 24 25  25         Information is confidential and restricted. Go to Review Flowsheets to unlock data.    Patient-reported     [place] Suicide Severity Rating Scale (Lifetime/Recent)      2/3/2019     3:50 AM 1/20/2021     7:53 PM 2/20/2024    10:00 AM 7/25/2024    11:54 AM   [place] Suicide Severity Rating (Lifetime/Recent)   Q1 Wished to be Dead (Past Month) no no     Q2 Suicidal Thoughts (Past Month) no no     Q3 Suicidal Thought Method  no     Q4 Suicidal Intent without Specific Plan  no     Q5 Suicide Intent with Specific Plan  no     Q6 Suicide Behavior (Lifetime) no no     1. Wish to be Dead (Lifetime)   Y N   Wish to be Dead Description (Lifetime)   The patient denied having any history of suicide attempts.    1. Wish to be Dead (Past 1 Month)   Y    Wish to be Dead Description (Past 1 Month)   The patient reported she had ONLY had passive suicide ideation, with no plan or intent to harm herself.  The thoughts had been along the lines of \"thongs would be easier if she were not around\".    2. Non-Specific Active Suicidal Thoughts (Lifetime)   Y N   Non-Specific Active Suicidal Thought Description (Lifetime)   See above    2. Non-Specific Active Suicidal Thoughts (Past 1 Month)   Y    Non-Specific Active Suicidal Thought Description (Past 1 Month)   See above    3. Active Suicidal Ideation with any Methods (Not Plan) Without Intent to Act (Lifetime)   N    Active Suicidal Ideation with any " Methods (Not Plan) Description (Lifetime)   NA    3. Active Suicidal Ideation with any Methods (Not Plan) Without Intent to Act (Past 1 Month)   N    4. Active Suicidal Ideation with Some Intent to Act, Without Specific Plan (Lifetime)   N    Active Suicidal Ideation with Some Intent to Act, Without Specific Plan Description (Lifetime)   NA    4. Active Suicidal Ideation with Some Intent to Act, Without Specific Plan (Past 1 Month)   N    5. Active Suicidal Ideation with Specific Plan and Intent (Lifetime)   N    Active Suicidal Ideation with Specific Plan and Intent Description (Lifetime)   NA    5. Active Suicidal Ideation with Specific Plan and Intent (Past 1 Month)   N    Most Severe Ideation Rating (Lifetime)   3    Description of Most Severe Ideation (Lifetime)   See abobe    Most Severe Ideation Rating (Past 1 Month)   2    Description of Most Severe Ideation (Past 1 Month)   See above    Frequency (Lifetime)   1    Frequency (Past 1 Month)   1    Duration (Lifetime)   3    Duration (Past 1 Month)   3    Controllability (Lifetime)   3    Controllability (Past 1 Month)   3    Deterrents (Lifetime)   1    Deterrents (Past 1 Month)   1    Reasons for Ideation (Lifetime)   5    Reasons for Ideation (Past 1 Month)   5    Actual Attempt (Lifetime)   N    Has subject engaged in non-suicidal self-injurious behavior? (Lifetime)   N    Interrupted Attempts (Lifetime)   N    Aborted or Self-Interrupted Attempt (Lifetime)   N    Preparatory Acts or Behavior (Lifetime)   N    Calculated C-SSRS Risk Score (Lifetime/Recent)   Low Risk          ASSESSMENT: Current Emotional / Mental Status (status of significant symptoms):   Risk status (Self / Other harm or suicidal ideation)   Patient denies current fears or concerns for personal safety.   Patient denies current or recent suicidal ideation or behaviors.    Patient denies current or recent homicidal ideation or behaviors.   Patient denies current or recent self injurious  behavior or ideation.   Patient denies other safety concerns.   Patient reports there has been no change in risk factors since their last session.     Patient reports there has been no change in protective factors since their last session.     Recommended that patient call 911 or go to the local ED should there be a change in any of these risk factors     Appearance:   Appropriate    Eye Contact:   Good    Psychomotor Behavior: Normal    Attitude:   Cooperative  Pleasant   Orientation:   All   Speech    Rate / Production: Normal/ Responsive    Volume:  Normal    Mood:    Anxious  Depressed    Affect:    Tearful   Thought Content:  Clear    Thought Form:  Coherent  Logical    Insight:    Fair      Medication Review:   No changes to current psychiatric medication(s)     Medication Compliance:   Yes     Changes in Health Issues:   None reported     Chemical Use Review:   Substance Use: Chemical use reviewed, no active concerns identified .  However, patient's daily cannabis use will continue to be evaluated for Cannabis Use Disorder. Patient reported successfully completing chemical dependency treatment at [facility] and has reportedly been sober from alcohol use since February 2024.      Tobacco Use: No current tobacco use.      Diagnosis:  1. Recurrent major depressive disorder, in partial remission    Other Diagnoses that is relevant to services: Alcohol Use Disorder, currently in partial remission   Provisional Diagnoses in the process of being ruled-out: ADHD & PTSD      Collateral Reports Completed:   Not Applicable    PLAN: (Patient Tasks / Therapist Tasks / Other)  Patient will return in 4 weeks for next session. Patient will engage in self-care activities. Patient will work on mindfulness skills to increase awareness of triggers to anxiety and depression. Patient will work on open and direct communication skills with her . Patient will work on positive and negative reinforcement of her  's behaviors. Patient will work on setting clear boundaries and expectations with her spouse. Patient will consider increasing to 3 consistent meals throughout the day. Patient will continue abstaining from alcohol use. Patient will work on focusing on things within her control.    Melanie Lei, [facility]                                                         ______________________________________________________________________    Individual Treatment Plan    Patient's Name: Lanie Martin  YOB: 1990    Date of Creation: 8/1/24  Date Treatment Plan Last Reviewed/Revised: 2/27/25    DSM5 Diagnoses:   Encounter Diagnosis   Name Primary?    Recurrent major depressive disorder, in partial remission Yes       Psychosocial / Contextual Factors: Substance use, relationship stressors, occupational dynamics.  PROMIS (reviewed every 90 days):   PROMIS-10 from encounters over the past 365 days     2/27/25 11/19/24 11/5/24 10/22/24 7/25/24   Global Mental Health Score 9 (P)  9 (P)  9 (P) 8 (P) 9   Global Physical Health Score 16 (P)  16 (P)  16 (P) 16 (P) 17   PROMIS TOTAL - SUBSCORES 25 (P)  25 (P)  25 (P) 24 (P) 26       Referral / Collaboration:  Referral to another professional/service is not indicated at this time..    Anticipated number of session for this episode of care: 9-12 sessions  Anticipation frequency of session: Biweekly  Anticipated Duration of each session: 38-52 minutes  Treatment plan will be reviewed in 90 days or when goals have been changed.       MeasurableTreatment Goal(s) related to diagnosis / functional impairment(s)  Goal 1: Patient will reduce depression symptoms as evidenced by a reduction in their PHQ9 score to a 2 or less in the next 2 months.     I will know I've met my goal when I'm smiling more.      Objective #A (Patient Action)    Patient will Decrease frequency and intensity of feeling down, depressed, hopeless.  Status: Continued - Date(s):  2/27/25    Intervention(s)  Therapist will teach  distress tolerance and coping skills .    Objective #B  Patient will Increase interest, engagement, and pleasure in doing things.  Status: Continued - Date(s): 2/27/25    Intervention(s)  Therapist will  teach self-care skills .    Objective #C  Patient will Improve diet, appetite, mindful eating, and / or meal planning.  Status: Continued - Date(s): 2/27/25    Intervention(s)  Therapist will  provide psycho-education on the impact and effects of nutrition on mental health.  .        Goal 2: Patient will reduce anxiety symptoms as evidenced by a reduction in their GAD7 score to a 4 or less in the next 2 months.     I will know I've met my goal when I don't panic when someone walks in for their shift & I'm not stressing out when my  sighs.      Objective #A (Patient Action)    Status: Continued - Date(s): 2/27/25    Patient will identify at least 2 triggers for anxiety.    Intervention(s)  Therapist will  teach mindfulness skills .    Objective #B  Patient will use thought-stopping strategy daily to reduce intrusive thoughts.    Status: Continued - Date(s): 2/27/25    Intervention(s)  Therapist will  teach thought-stopping and thought-challenging techniques .        Patient has reviewed and agreed to the above plan.    REGINE Lopez on 2/27/2025 at 8:05 AM

## 2025-03-11 ENCOUNTER — OFFICE VISIT (OUTPATIENT)
Dept: FAMILY MEDICINE | Facility: CLINIC | Age: 35
End: 2025-03-11
Payer: COMMERCIAL

## 2025-03-11 ENCOUNTER — ANCILLARY PROCEDURE (OUTPATIENT)
Dept: GENERAL RADIOLOGY | Facility: CLINIC | Age: 35
End: 2025-03-11
Attending: FAMILY MEDICINE
Payer: COMMERCIAL

## 2025-03-11 VITALS
BODY MASS INDEX: 22.15 KG/M2 | HEART RATE: 74 BPM | TEMPERATURE: 97.8 F | SYSTOLIC BLOOD PRESSURE: 90 MMHG | DIASTOLIC BLOOD PRESSURE: 60 MMHG | OXYGEN SATURATION: 98 % | HEIGHT: 63 IN | WEIGHT: 125 LBS | RESPIRATION RATE: 18 BRPM

## 2025-03-11 DIAGNOSIS — M79.645 PAIN OF FINGER OF LEFT HAND: ICD-10-CM

## 2025-03-11 DIAGNOSIS — M79.645 PAIN OF FINGER OF LEFT HAND: Primary | ICD-10-CM

## 2025-03-11 PROCEDURE — 3074F SYST BP LT 130 MM HG: CPT | Performed by: FAMILY MEDICINE

## 2025-03-11 PROCEDURE — 99213 OFFICE O/P EST LOW 20 MIN: CPT | Performed by: FAMILY MEDICINE

## 2025-03-11 PROCEDURE — 1125F AMNT PAIN NOTED PAIN PRSNT: CPT | Performed by: FAMILY MEDICINE

## 2025-03-11 PROCEDURE — 3078F DIAST BP <80 MM HG: CPT | Performed by: FAMILY MEDICINE

## 2025-03-11 PROCEDURE — 73140 X-RAY EXAM OF FINGER(S): CPT | Mod: TC | Performed by: STUDENT IN AN ORGANIZED HEALTH CARE EDUCATION/TRAINING PROGRAM

## 2025-03-11 ASSESSMENT — PAIN SCALES - GENERAL: PAINLEVEL_OUTOF10: MILD PAIN (3)

## 2025-03-11 NOTE — PATIENT INSTRUCTIONS
Xray of finger today.     Follow up with sports med/orthopedic hand for further evaluation of the hand.

## 2025-03-11 NOTE — PROGRESS NOTES
Assessment & Plan     Pain of finger of left hand  -- injury about 3 months ago with a filet knife into the finger/ joint. Continues to have a dull ache and a bump over the finger. No prior evaluation. No evidence of infection on exam. Plan to proceed with xray of finger and follow up with sports med/ orthopedic hand for further evaluation and cares.   - XR Finger Left G/E 2 Views  - Orthopedic  Referral      The risks, benefits and treatment options of prescribed medications or other treatments have been discussed with the patient. The patient verbalized their understanding and should call or follow up if no improvement or if they develop further problems.      Subjective   Kayla is a 34 year old, presenting for the following health issues:  Hand Injury      3/11/2025     9:11 AM   Additional Questions   Roomed by Rolanda MIROSLAVA   Accompanied by son, Ace     History of Present Illness       Reason for visit:  Finger got cut a long time ago, hurting worse now (tendon??)  Symptom onset:  More than a month  Symptoms include:  Pain in knuckle, hard to bend, big knot on knuckle  Symptom intensity:  Moderate  Symptom progression:  Staying the same  Had these symptoms before:  No  What makes it worse:  The cold weather makes it hurt worse  What makes it better:  Ibprophin, jacki She is missing 1 dose(s) of medications per week.  She is not taking prescribed medications regularly due to remembering to take.          Left hand middle finger.   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 Did not get evaluated for this.   Continues to have a dull pain in the finger and joint which isn't improving.   Can't fully flex or extend at the PIP.   Has not been hot. Occasionally will get red and larger if she continues to touch it throughout the day.     Reports almost 1 year sober from alcohol.           Objective    BP 90/60 (BP Location: Right arm, Patient Position: Chair, Cuff Size:  "Adult Regular)   Pulse 74   Temp 97.8  F (36.6  C) (Oral)   Resp 18   Ht 1.6 m (5' 3\")   Wt 56.7 kg (125 lb)   LMP 02/19/2025 (Approximate)   SpO2 98%   BMI 22.14 kg/m    Body mass index is 22.14 kg/m .  Physical Exam   General: alert, cooperative, no acute distress   MSK hand left: 3rd digit PIP mildly swollen, no erythema, mild tenderness with palpation. Able to active flex and extend and MCP, PIP, DIP. ROM slightly limited in PIP in flexion and extension.  strength strong. Sensation intact.       Signed Electronically by: Brad Lopez DO    "

## 2025-03-12 ENCOUNTER — PATIENT OUTREACH (OUTPATIENT)
Dept: CARE COORDINATION | Facility: CLINIC | Age: 35
End: 2025-03-12
Payer: COMMERCIAL

## 2025-03-27 ENCOUNTER — VIRTUAL VISIT (OUTPATIENT)
Dept: BEHAVIORAL HEALTH | Facility: CLINIC | Age: 35
End: 2025-03-27
Payer: COMMERCIAL

## 2025-03-27 DIAGNOSIS — F33.41 RECURRENT MAJOR DEPRESSIVE DISORDER, IN PARTIAL REMISSION: ICD-10-CM

## 2025-03-27 DIAGNOSIS — F41.1 GENERALIZED ANXIETY DISORDER: Primary | ICD-10-CM

## 2025-03-27 NOTE — PROGRESS NOTES
[facility]                                     Progress Note    Patient Name: Lanie Martin  Date: 3/27/25           Service Type: Individual      Session Start Time:  8.05  Session End Time: 8.43     Session Length: 38-52 minutes     Session #: 13    Attendees: Client attended alone    Service Modality:  Video Visit:      Provider verified identity through the following two step process.  Patient provided:  Patient  and Patient address    Telemedicine Visit: The patient's condition can be safely assessed and treated via synchronous audio and visual telemedicine encounter.      Reason for Telemedicine Visit: Patient has requested telehealth visit    Originating Site (Patient Location): Patient's home    Distant Site (Provider Location): [facility]    Consent:  The patient/guardian has verbally consented to: the potential risks and benefits of telemedicine (video visit) versus in person care; bill my insurance or make self-payment for services provided; and responsibility for payment of non-covered services.     Patient would like the video invitation sent by:  My Chart    Mode of Communication:  Video Conference via [employer]well    Distant Location (Provider):  On-site    As the provider I attest to compliance with applicable laws and regulations related to telemedicine.    DATA  Interactive Complexity: No  Crisis: No         Progress Since Last Session (Related to Symptoms / Goals / Homework):   Symptoms: Worsening . Patient's GAD7 and PHQ9 scores have increased.    Homework: Partially completed      Episode of Care Goals: Satisfactory progress - ACTION (Actively working towards change); Intervened by reinforcing change plan / affirming steps taken     Current / Ongoing Stressors and Concerns:  Patient discussed getting laid off from her job on 3/13 and currently interviewing for other positions. Patient discussed enjoying her time at home.  Patient discussed financial stressors. 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 Patient discussed relationship dynamics with her , Bernabe. Patient discussed behavioral struggles with her two young sons. Patient discussed her recent 1 year sober anniversary from alcohol.        Treatment Objective(s) Addressed in This Session:   Increase interest, engagement, and pleasure in doing things  Decrease frequency and intensity of feeling anxious, nervous, worried       Intervention:   Motivational Interviewing    MI Intervention: Expressed Empathy/Understanding, Supported Autonomy, Collaboration, Evocation, Open-ended questions, and Reflections: simple and complex     Change Talk Expressed by the Patient: Desire to change    Provider Response to Change Talk: E - Evoked more info from patient about behavior change, A - Affirmed patient's thoughts, decisions, or attempts at behavior change, and R - Reflected patient's change talk    Assessments completed prior to visit:  The following assessments were completed by patient for this visit:  PHQ9:       10/21/2024     7:33 PM 11/4/2024     9:31 AM 11/19/2024     1:56 PM 12/10/2024     5:43 AM 12/31/2024     2:44 PM 1/13/2025     1:35 PM 2/27/2025     7:36 AM   PHQ-9 SCORE   PHQ-9 Total Score MyChart 9 (Mild depression) 3 (Minimal depression) 8 (Mild depression) 7 (Mild depression) 7 (Mild depression) 4 (Minimal depression) 9 (Mild depression)   PHQ-9 Total Score 9 3  8  7  7  4  9        Patient-reported     GAD7:       5/10/2022     7:54 AM 8/28/2022     1:11 PM 12/20/2023     3:25 PM 1/24/2024     8:15 AM 7/22/2024    10:49 AM 11/4/2024     9:31 AM 1/13/2025     1:35 PM   TRINITY-7 SCORE   Total Score  4 (minimal anxiety) 5 (mild anxiety)  8 (mild anxiety) 4 (minimal anxiety) 7 (mild anxiety)   Total Score 3 4 5 4 8 4  7        Patient-reported     PROMIS 10-Global Health (all questions and answers displayed):       6/10/2024    11:56 AM 7/18/2024     8:01 AM  10/21/2024     7:34 PM 11/5/2024    12:49 PM 11/19/2024     1:58 PM 12/10/2024     5:45 AM 2/27/2025     8:03 AM   PROMIS 10   In general, would you say your health is:  Good Fair Good Fair     In general, would you say your quality of life is:  Fair Fair Good Good     In general, how would you rate your physical health?  Good Fair Fair Good     In general, how would you rate your mental health, including your mood and your ability to think?  Poor Fair Fair Fair     In general, how would you rate your satisfaction with your social activities and relationships?  Good Poor Poor Fair     In general, please rate how well you carry out your usual social activities and roles  Fair Poor Good Good     To what extent are you able to carry out your everyday physical activities such as walking, climbing stairs, carrying groceries, or moving a chair?  Completely Completely Completely Completely     In the past 7 days, how often have you been bothered by emotional problems such as feeling anxious, depressed, or irritable?  Sometimes Sometimes Sometimes Often     In the past 7 days, how would you rate your fatigue on average?  Mild Mild Mild Moderate     In the past 7 days, how would you rate your pain on average, where 0 means no pain, and 10 means worst imaginable pain?  0 0 0 0     In general, would you say your health is:  3 2 3 2  2   In general, would you say your quality of life is:  2 2 3 3  3   In general, how would you rate your physical health?  3 2 2 3  3   In general, how would you rate your mental health, including your mood and your ability to think?  1 2 2 2  2   In general, how would you rate your satisfaction with your social activities and relationships?  3 1 1 2  2   In general, please rate how well you carry out your usual social activities and roles. (This includes activities at home, at work and in your community, and responsibilities as a parent, child, spouse, employee, friend, etc.)  2 1 3 3  3   To what  "extent are you able to carry out your everyday physical activities such as walking, climbing stairs, carrying groceries, or moving a chair?  5 5 5 5  5   In the past 7 days, how often have you been bothered by emotional problems such as feeling anxious, depressed, or irritable?  3 3 3 4  4   In the past 7 days, how would you rate your fatigue on average?  2 2 2 3  3   In the past 7 days, how would you rate your pain on average, where 0 means no pain, and 10 means worst imaginable pain?  0 0 0 0  0   Global Mental Health Score  9 8 9  9   9   Global Physical Health Score  17 16 16  16   16   PROMIS TOTAL - SUBSCORES  26 24 25  25   25       Information is confidential and restricted. Go to Review Flowsheets to unlock data.    Patient-reported     Hope Suicide Severity Rating Scale (Lifetime/Recent)      2/3/2019     3:50 AM 1/20/2021     7:53 PM 2/20/2024    10:00 AM 7/25/2024    11:54 AM   Hope Suicide Severity Rating (Lifetime/Recent)   Q1 Wished to be Dead (Past Month) no no     Q2 Suicidal Thoughts (Past Month) no no     Q3 Suicidal Thought Method  no     Q4 Suicidal Intent without Specific Plan  no     Q5 Suicide Intent with Specific Plan  no     Q6 Suicide Behavior (Lifetime) no no     1. Wish to be Dead (Lifetime)   Y N   Wish to be Dead Description (Lifetime)   The patient denied having any history of suicide attempts.    1. Wish to be Dead (Past 1 Month)   Y    Wish to be Dead Description (Past 1 Month)   The patient reported she had ONLY had passive suicide ideation, with no plan or intent to harm herself.  The thoughts had been along the lines of \"thongs would be easier if she were not around\".    2. Non-Specific Active Suicidal Thoughts (Lifetime)   Y N   Non-Specific Active Suicidal Thought Description (Lifetime)   See above    2. Non-Specific Active Suicidal Thoughts (Past 1 Month)   Y    Non-Specific Active Suicidal Thought Description (Past 1 Month)   See above    3. Active Suicidal Ideation " with any Methods (Not Plan) Without Intent to Act (Lifetime)   N    Active Suicidal Ideation with any Methods (Not Plan) Description (Lifetime)   NA    3. Active Suicidal Ideation with any Methods (Not Plan) Without Intent to Act (Past 1 Month)   N    4. Active Suicidal Ideation with Some Intent to Act, Without Specific Plan (Lifetime)   N    Active Suicidal Ideation with Some Intent to Act, Without Specific Plan Description (Lifetime)   NA    4. Active Suicidal Ideation with Some Intent to Act, Without Specific Plan (Past 1 Month)   N    5. Active Suicidal Ideation with Specific Plan and Intent (Lifetime)   N    Active Suicidal Ideation with Specific Plan and Intent Description (Lifetime)   NA    5. Active Suicidal Ideation with Specific Plan and Intent (Past 1 Month)   N    Most Severe Ideation Rating (Lifetime)   3    Description of Most Severe Ideation (Lifetime)   See abobe    Most Severe Ideation Rating (Past 1 Month)   2    Description of Most Severe Ideation (Past 1 Month)   See above    Frequency (Lifetime)   1    Frequency (Past 1 Month)   1    Duration (Lifetime)   3    Duration (Past 1 Month)   3    Controllability (Lifetime)   3    Controllability (Past 1 Month)   3    Deterrents (Lifetime)   1    Deterrents (Past 1 Month)   1    Reasons for Ideation (Lifetime)   5    Reasons for Ideation (Past 1 Month)   5    Actual Attempt (Lifetime)   N    Has subject engaged in non-suicidal self-injurious behavior? (Lifetime)   N    Interrupted Attempts (Lifetime)   N    Aborted or Self-Interrupted Attempt (Lifetime)   N    Preparatory Acts or Behavior (Lifetime)   N    Calculated C-SSRS Risk Score (Lifetime/Recent)   Low Risk          ASSESSMENT: Current Emotional / Mental Status (status of significant symptoms):   Risk status (Self / Other harm or suicidal ideation)   Patient denies current fears or concerns for personal safety.   Patient denies current or recent suicidal ideation or behaviors.    Patient denies  current or recent homicidal ideation or behaviors.   Patient denies current or recent self injurious behavior or ideation.   Patient denies other safety concerns.   Patient reports there has been no change in risk factors since their last session.     Patient reports there has been no change in protective factors since their last session.     Recommended that patient call 911 or go to the local ED should there be a change in any of these risk factors     Appearance:   Appropriate    Eye Contact:   Good    Psychomotor Behavior: Normal    Attitude:   Cooperative  Pleasant   Orientation:   All   Speech    Rate / Production: Normal/ Responsive    Volume:  Normal    Mood:    Anxious    Affect:    Appropriate    Thought Content:  Clear    Thought Form:  Coherent  Logical    Insight:    Fair      Medication Review:   No changes to current psychiatric medication(s)     Medication Compliance:   Yes     Changes in Health Issues:   None reported     Chemical Use Review:   Substance Use: Chemical use reviewed, no active concerns identified .  However, patient's daily cannabis use will continue to be evaluated for Cannabis Use Disorder. Patient reported successfully completing chemical dependency treatment at [facility] and has reportedly been sober from alcohol use since February 2024.      Tobacco Use: No current tobacco use.      Diagnosis:  1. Generalized anxiety disorder    2. Recurrent major depressive disorder, in partial remission    Other Diagnoses that is relevant to services: Alcohol Use Disorder, currently in partial remission   Provisional Diagnoses in the process of being ruled-out: ADHD & PTSD      Collateral Reports Completed:   Not Applicable    PLAN: (Patient Tasks / Therapist Tasks / Other)  Patient will return in 4 weeks for next session. Patient will engage in self-care activities. Patient will work on mindfulness skills to increase awareness of triggers to anxiety and depression. Patient will work  on open and direct communication skills with her . Patient will work on positive and negative reinforcement of her 's behaviors. Patient will work on setting clear boundaries and expectations with her spouse. Patient will consider increasing to 3 consistent meals throughout the day. Patient will continue abstaining from alcohol use. Patient will work on focusing on things within her control.      Melanie Lei, [facility]                                                         ______________________________________________________________________    Individual Treatment Plan    Patient's Name: Lanie Martin  YOB: 1990    Date of Creation: 8/1/24  Date Treatment Plan Last Reviewed/Revised: 2/27/25    DSM5 Diagnoses:   Encounter Diagnoses   Name Primary?    Generalized anxiety disorder Yes    Recurrent major depressive disorder, in partial remission        Psychosocial / Contextual Factors: Substance use, relationship stressors, occupational dynamics.  PROMIS (reviewed every 90 days):   PROMIS-10 from encounters over the past 365 days     2/27/25 11/19/24 11/5/24 10/22/24 7/25/24   Global Mental Health Score 9 (P)  9 (P)  9 (P) 8 (P) 9   Global Physical Health Score 16 (P)  16 (P)  16 (P) 16 (P) 17   PROMIS TOTAL - SUBSCORES 25 (P)  25 (P)  25 (P) 24 (P) 26       Referral / Collaboration:  Referral to another professional/service is not indicated at this time..    Anticipated number of session for this episode of care: 9-12 sessions  Anticipation frequency of session: Biweekly  Anticipated Duration of each session: 38-52 minutes  Treatment plan will be reviewed in 90 days or when goals have been changed.       MeasurableTreatment Goal(s) related to diagnosis / functional impairment(s)  Goal 1: Patient will reduce depression symptoms as evidenced by a reduction in their PHQ9 score to a 2 or less in the next 2 months.     I will know I've met my goal when I'm smiling more.      Objective #A  (Patient Action)    Patient will Decrease frequency and intensity of feeling down, depressed, hopeless.  Status: Continued - Date(s): 2/27/25    Intervention(s)  Therapist will teach  distress tolerance and coping skills .    Objective #B  Patient will Increase interest, engagement, and pleasure in doing things.  Status: Continued - Date(s): 2/27/25    Intervention(s)  Therapist will  teach self-care skills .    Objective #C  Patient will Improve diet, appetite, mindful eating, and / or meal planning.  Status: Continued - Date(s): 2/27/25    Intervention(s)  Therapist will  provide psycho-education on the impact and effects of nutrition on mental health.  .        Goal 2: Patient will reduce anxiety symptoms as evidenced by a reduction in their GAD7 score to a 4 or less in the next 2 months.     I will know I've met my goal when I don't panic when someone walks in for their shift & I'm not stressing out when my  sighs.      Objective #A (Patient Action)    Status: Continued - Date(s): 2/27/25    Patient will identify at least 2 triggers for anxiety.    Intervention(s)  Therapist will  teach mindfulness skills .    Objective #B  Patient will use thought-stopping strategy daily to reduce intrusive thoughts.    Status: Continued - Date(s): 2/27/25    Intervention(s)  Therapist will  teach thought-stopping and thought-challenging techniques .        Patient has reviewed and agreed to the above plan.    Melanie Lei, [facility] on 2/27/2025 at 8:05 AM

## 2025-04-22 ENCOUNTER — VIRTUAL VISIT (OUTPATIENT)
Dept: BEHAVIORAL HEALTH | Facility: CLINIC | Age: 35
End: 2025-04-22
Payer: COMMERCIAL

## 2025-04-22 DIAGNOSIS — F33.41 RECURRENT MAJOR DEPRESSIVE DISORDER, IN PARTIAL REMISSION: ICD-10-CM

## 2025-04-22 DIAGNOSIS — F41.1 GENERALIZED ANXIETY DISORDER: Primary | ICD-10-CM

## 2025-04-22 PROCEDURE — 90834 PSYTX W PT 45 MINUTES: CPT | Mod: 95

## 2025-04-22 ASSESSMENT — PATIENT HEALTH QUESTIONNAIRE - PHQ9
SUM OF ALL RESPONSES TO PHQ QUESTIONS 1-9: 11
SUM OF ALL RESPONSES TO PHQ QUESTIONS 1-9: 11
10. IF YOU CHECKED OFF ANY PROBLEMS, HOW DIFFICULT HAVE THESE PROBLEMS MADE IT FOR YOU TO DO YOUR WORK, TAKE CARE OF THINGS AT HOME, OR GET ALONG WITH OTHER PEOPLE: VERY DIFFICULT

## 2025-04-22 NOTE — PROGRESS NOTES
[facility]                                     Progress Note    Patient Name: Lanie Martin  Date: 25           Service Type: Individual      Session Start Time:  8.57  Session End Time: 9.36     Session Length: 38-52 minutes     Session #: 14    Attendees: Client attended alone    Service Modality:  Video Visit:      Provider verified identity through the following two step process.  Patient provided:  Patient  and Patient address    Telemedicine Visit: The patient's condition can be safely assessed and treated via synchronous audio and visual telemedicine encounter.      Reason for Telemedicine Visit: Patient has requested telehealth visit    Originating Site (Patient Location): Patient's home    Distant Site (Provider Location): [facility]    Consent:  The patient/guardian has verbally consented to: the potential risks and benefits of telemedicine (video visit) versus in person care; bill my insurance or make self-payment for services provided; and responsibility for payment of non-covered services.     Patient would like the video invitation sent by:  My Chart    Mode of Communication:  Video Conference via [employer]well    Distant Location (Provider):  Off-site    As the provider I attest to compliance with applicable laws and regulations related to telemedicine.    DATA  Interactive Complexity: No  Crisis: No         Progress Since Last Session (Related to Symptoms / Goals / Homework):   Symptoms: Worsening . Patient's GAD7 and PHQ9 scores have increased.    Homework: Partially completed      Episode of Care Goals: Satisfactory progress - ACTION (Actively working towards change); Intervened by reinforcing change plan / affirming steps taken     Current / Ongoing Stressors and Concerns:  Patient discussed her current unemployment and currently applying for other positions. Patient discussed stressors related to getting unemployment  benefits. Patient discussed financial stressors. Patient discussed her chickens and quails. Patient discussed her cats. Patient discussed her son's behavioral struggles at school. Patient discussed her mother-in-law soon passing away.        Treatment Objective(s) Addressed in This Session:   Increase interest, engagement, and pleasure in doing things  Decrease frequency and intensity of feeling anxious, nervous, worried       Intervention:   Motivational Interviewing    MI Intervention: Expressed Empathy/Understanding, Supported Autonomy, Collaboration, Evocation, Open-ended questions, and Reflections: simple and complex     Change Talk Expressed by the Patient: Desire to change    Provider Response to Change Talk: E - Evoked more info from patient about behavior change, A - Affirmed patient's thoughts, decisions, or attempts at behavior change, and R - Reflected patient's change talk    Assessments completed prior to visit:  The following assessments were completed by patient for this visit:  PHQ9:       11/4/2024     9:31 AM 11/19/2024     1:56 PM 12/10/2024     5:43 AM 12/31/2024     2:44 PM 1/13/2025     1:35 PM 2/27/2025     7:36 AM 4/22/2025     7:11 AM   PHQ-9 SCORE   PHQ-9 Total Score MyChart 3 (Minimal depression) 8 (Mild depression) 7 (Mild depression) 7 (Mild depression) 4 (Minimal depression) 9 (Mild depression) 11 (Moderate depression)   PHQ-9 Total Score 3  8  7  7  4  9  11        Patient-reported     GAD7:       5/10/2022     7:54 AM 8/28/2022     1:11 PM 12/20/2023     3:25 PM 1/24/2024     8:15 AM 7/22/2024    10:49 AM 11/4/2024     9:31 AM 1/13/2025     1:35 PM   TRINITY-7 SCORE   Total Score  4 (minimal anxiety) 5 (mild anxiety)  8 (mild anxiety) 4 (minimal anxiety) 7 (mild anxiety)   Total Score 3 4 5 4 8 4  7        Patient-reported     PROMIS 10-Global Health (all questions and answers displayed):       6/10/2024    11:56 AM 7/18/2024     8:01 AM 10/21/2024     7:34 PM 11/5/2024    12:49 PM  11/19/2024     1:58 PM 12/10/2024     5:45 AM 2/27/2025     8:03 AM   PROMIS 10   In general, would you say your health is:  Good Fair Good Fair     In general, would you say your quality of life is:  Fair Fair Good Good     In general, how would you rate your physical health?  Good Fair Fair Good     In general, how would you rate your mental health, including your mood and your ability to think?  Poor Fair Fair Fair     In general, how would you rate your satisfaction with your social activities and relationships?  Good Poor Poor Fair     In general, please rate how well you carry out your usual social activities and roles  Fair Poor Good Good     To what extent are you able to carry out your everyday physical activities such as walking, climbing stairs, carrying groceries, or moving a chair?  Completely Completely Completely Completely     In the past 7 days, how often have you been bothered by emotional problems such as feeling anxious, depressed, or irritable?  Sometimes Sometimes Sometimes Often     In the past 7 days, how would you rate your fatigue on average?  Mild Mild Mild Moderate     In the past 7 days, how would you rate your pain on average, where 0 means no pain, and 10 means worst imaginable pain?  0 0 0 0     In general, would you say your health is:  3 2 3 2  2   In general, would you say your quality of life is:  2 2 3 3  3   In general, how would you rate your physical health?  3 2 2 3  3   In general, how would you rate your mental health, including your mood and your ability to think?  1 2 2 2  2   In general, how would you rate your satisfaction with your social activities and relationships?  3 1 1 2  2   In general, please rate how well you carry out your usual social activities and roles. (This includes activities at home, at work and in your community, and responsibilities as a parent, child, spouse, employee, friend, etc.)  2 1 3 3  3   To what extent are you able to carry out your  "everyday physical activities such as walking, climbing stairs, carrying groceries, or moving a chair?  5 5 5 5  5   In the past 7 days, how often have you been bothered by emotional problems such as feeling anxious, depressed, or irritable?  3 3 3 4  4   In the past 7 days, how would you rate your fatigue on average?  2 2 2 3  3   In the past 7 days, how would you rate your pain on average, where 0 means no pain, and 10 means worst imaginable pain?  0 0 0 0  0   Global Mental Health Score  9 8 9  9   9   Global Physical Health Score  17 16 16  16   16   PROMIS TOTAL - SUBSCORES  26 24 25  25   25       Information is confidential and restricted. Go to Review Flowsheets to unlock data.    Patient-reported     [place] Suicide Severity Rating Scale (Lifetime/Recent)      2/3/2019     3:50 AM 1/20/2021     7:53 PM 2/20/2024    10:00 AM 7/25/2024    11:54 AM   [place] Suicide Severity Rating (Lifetime/Recent)   Q1 Wished to be Dead (Past Month) no no     Q2 Suicidal Thoughts (Past Month) no no     Q3 Suicidal Thought Method  no     Q4 Suicidal Intent without Specific Plan  no     Q5 Suicide Intent with Specific Plan  no     Q6 Suicide Behavior (Lifetime) no no     1. Wish to be Dead (Lifetime)   Y N   Wish to be Dead Description (Lifetime)   The patient denied having any history of suicide attempts.    1. Wish to be Dead (Past 1 Month)   Y    Wish to be Dead Description (Past 1 Month)   The patient reported she had ONLY had passive suicide ideation, with no plan or intent to harm herself.  The thoughts had been along the lines of \"thongs would be easier if she were not around\".    2. Non-Specific Active Suicidal Thoughts (Lifetime)   Y N   Non-Specific Active Suicidal Thought Description (Lifetime)   See above    2. Non-Specific Active Suicidal Thoughts (Past 1 Month)   Y    Non-Specific Active Suicidal Thought Description (Past 1 Month)   See above    3. Active Suicidal Ideation with any Methods (Not Plan) Without " Intent to Act (Lifetime)   N    Active Suicidal Ideation with any Methods (Not Plan) Description (Lifetime)   NA    3. Active Suicidal Ideation with any Methods (Not Plan) Without Intent to Act (Past 1 Month)   N    4. Active Suicidal Ideation with Some Intent to Act, Without Specific Plan (Lifetime)   N    Active Suicidal Ideation with Some Intent to Act, Without Specific Plan Description (Lifetime)   NA    4. Active Suicidal Ideation with Some Intent to Act, Without Specific Plan (Past 1 Month)   N    5. Active Suicidal Ideation with Specific Plan and Intent (Lifetime)   N    Active Suicidal Ideation with Specific Plan and Intent Description (Lifetime)   NA    5. Active Suicidal Ideation with Specific Plan and Intent (Past 1 Month)   N    Most Severe Ideation Rating (Lifetime)   3    Description of Most Severe Ideation (Lifetime)   See abobe    Most Severe Ideation Rating (Past 1 Month)   2    Description of Most Severe Ideation (Past 1 Month)   See above    Frequency (Lifetime)   1    Frequency (Past 1 Month)   1    Duration (Lifetime)   3    Duration (Past 1 Month)   3    Controllability (Lifetime)   3    Controllability (Past 1 Month)   3    Deterrents (Lifetime)   1    Deterrents (Past 1 Month)   1    Reasons for Ideation (Lifetime)   5    Reasons for Ideation (Past 1 Month)   5    Actual Attempt (Lifetime)   N    Has subject engaged in non-suicidal self-injurious behavior? (Lifetime)   N    Interrupted Attempts (Lifetime)   N    Aborted or Self-Interrupted Attempt (Lifetime)   N    Preparatory Acts or Behavior (Lifetime)   N    Calculated C-SSRS Risk Score (Lifetime/Recent)   Low Risk          ASSESSMENT: Current Emotional / Mental Status (status of significant symptoms):   Risk status (Self / Other harm or suicidal ideation)   Patient denies current fears or concerns for personal safety.   Patient denies current or recent suicidal ideation or behaviors.    Patient denies current or recent homicidal ideation  or behaviors.   Patient denies current or recent self injurious behavior or ideation.   Patient denies other safety concerns.   Patient reports there has been no change in risk factors since their last session.     Patient reports there has been no change in protective factors since their last session.     Recommended that patient call 911 or go to the local ED should there be a change in any of these risk factors     Appearance:   Appropriate    Eye Contact:   Good    Psychomotor Behavior: Normal    Attitude:   Cooperative  Pleasant   Orientation:   All   Speech    Rate / Production: Normal/ Responsive    Volume:  Normal    Mood:    Anxious    Affect:    Appropriate    Thought Content:  Clear    Thought Form:  Coherent  Logical    Insight:    Fair      Medication Review:   No changes to current psychiatric medication(s)     Medication Compliance:   Yes     Changes in Health Issues:   None reported     Chemical Use Review:   Substance Use: Chemical use reviewed, no active concerns identified .  However, patient's daily cannabis use will continue to be evaluated for Cannabis Use Disorder. Patient reported successfully completing chemical dependency treatment at [facility] and has reportedly been sober from alcohol use since February 2024.      Tobacco Use: No current tobacco use.      Diagnosis:  1. Generalized anxiety disorder    2. Recurrent major depressive disorder, in partial remission    Other Diagnoses that is relevant to services: Alcohol Use Disorder, currently in partial remission   Provisional Diagnoses in the process of being ruled-out: ADHD & PTSD      Collateral Reports Completed:   Not Applicable    PLAN: (Patient Tasks / Therapist Tasks / Other)  Patient will return in 3 weeks for next session. Patient will engage in self-care activities. Patient will work on mindfulness skills to increase awareness of triggers to anxiety and depression. Patient will work on open and direct communication  skills with her . Patient will work on positive and negative reinforcement of her 's behaviors. Patient will work on setting clear boundaries and expectations with her spouse. Patient will consider increasing to 3 consistent meals throughout the day. Patient will continue abstaining from alcohol use. Patient will work on focusing on things within her control.      Melanie Lei, [facility]                                                         ______________________________________________________________________    Individual Treatment Plan    Patient's Name: Lanie Martin  YOB: 1990    Date of Creation: 8/1/24  Date Treatment Plan Last Reviewed/Revised: 2/27/25    DSM5 Diagnoses:   Encounter Diagnoses   Name Primary?    Generalized anxiety disorder Yes    Recurrent major depressive disorder, in partial remission        Psychosocial / Contextual Factors: Substance use, relationship stressors, occupational dynamics.  PROMIS (reviewed every 90 days):   PROMIS-10 from encounters over the past 365 days     2/27/25 11/19/24 11/5/24 10/22/24 7/25/24   Global Mental Health Score 9 (P)  9 (P)  9 (P) 8 (P) 9   Global Physical Health Score 16 (P)  16 (P)  16 (P) 16 (P) 17   PROMIS TOTAL - SUBSCORES 25 (P)  25 (P)  25 (P) 24 (P) 26       Referral / Collaboration:  Referral to another professional/service is not indicated at this time..    Anticipated number of session for this episode of care: 9-12 sessions  Anticipation frequency of session: Biweekly  Anticipated Duration of each session: 38-52 minutes  Treatment plan will be reviewed in 90 days or when goals have been changed.       MeasurableTreatment Goal(s) related to diagnosis / functional impairment(s)  Goal 1: Patient will reduce depression symptoms as evidenced by a reduction in their PHQ9 score to a 2 or less in the next 2 months.     I will know I've met my goal when I'm smiling more.      Objective #A (Patient Action)    Patient will  Decrease frequency and intensity of feeling down, depressed, hopeless.  Status: Continued - Date(s): 2/27/25    Intervention(s)  Therapist will teach  distress tolerance and coping skills .    Objective #B  Patient will Increase interest, engagement, and pleasure in doing things.  Status: Continued - Date(s): 2/27/25    Intervention(s)  Therapist will  teach self-care skills .    Objective #C  Patient will Improve diet, appetite, mindful eating, and / or meal planning.  Status: Continued - Date(s): 2/27/25    Intervention(s)  Therapist will  provide psycho-education on the impact and effects of nutrition on mental health.  .        Goal 2: Patient will reduce anxiety symptoms as evidenced by a reduction in their GAD7 score to a 4 or less in the next 2 months.     I will know I've met my goal when I don't panic when someone walks in for their shift & I'm not stressing out when my  sighs.      Objective #A (Patient Action)    Status: Continued - Date(s): 2/27/25    Patient will identify at least 2 triggers for anxiety.    Intervention(s)  Therapist will  teach mindfulness skills .    Objective #B  Patient will use thought-stopping strategy daily to reduce intrusive thoughts.    Status: Continued - Date(s): 2/27/25    Intervention(s)  Therapist will  teach thought-stopping and thought-challenging techniques .        Patient has reviewed and agreed to the above plan.    Melanie Lei [facility]AN[facility]CA on 2/27/2025 at 8:05 AM

## 2025-05-21 ENCOUNTER — VIRTUAL VISIT (OUTPATIENT)
Dept: BEHAVIORAL HEALTH | Facility: CLINIC | Age: 35
End: 2025-05-21
Payer: COMMERCIAL

## 2025-05-21 DIAGNOSIS — F41.1 GENERALIZED ANXIETY DISORDER: Primary | ICD-10-CM

## 2025-05-21 DIAGNOSIS — F33.41 RECURRENT MAJOR DEPRESSIVE DISORDER, IN PARTIAL REMISSION: ICD-10-CM

## 2025-05-21 PROCEDURE — 90834 PSYTX W PT 45 MINUTES: CPT | Mod: 95

## 2025-05-21 NOTE — PROGRESS NOTES
[facility]                                     Progress Note    Patient Name: Lanie Martin  Date: 25           Service Type: Individual      Session Start Time:  .03  Session End Time:      Session Length: 38-52 minutes     Session #: 15    Attendees: Client attended alone    Service Modality:  Video Visit:      Provider verified identity through the following two step process.  Patient provided:  Patient  and Patient address    Telemedicine Visit: The patient's condition can be safely assessed and treated via synchronous audio and visual telemedicine encounter.      Reason for Telemedicine Visit: Patient has requested telehealth visit    Originating Site (Patient Location): Patient's place of employment    Distant Site (Provider Location): [facility]    Consent:  The patient/guardian has verbally consented to: the potential risks and benefits of telemedicine (video visit) versus in person care; bill my insurance or make self-payment for services provided; and responsibility for payment of non-covered services.     Patient would like the video invitation sent by:  My Chart    Mode of Communication:  Video Conference via [employer]well    Distant Location (Provider):  Off-site    As the provider I attest to compliance with applicable laws and regulations related to telemedicine.    DATA  Interactive Complexity: No  Crisis: No         Progress Since Last Session (Related to Symptoms / Goals / Homework):   Symptoms: Worsening . Patient's GAD7 and PHQ9 scores have increased.    Homework: Partially completed      Episode of Care Goals: Satisfactory progress - ACTION (Actively working towards change); Intervened by reinforcing change plan / affirming steps taken     Current / Ongoing Stressors and Concerns:  Patient discussed getting a new job. Patient discussed workplace dynamics and culture. Patient discussed financial stressors. Patient  discussed going four-wheeling this weekend. Patient discussed her upcoming birthday. Patient discussed her Mother's Day. Patient discussed challenging relationship dynamics with her . Patient discussed an upcoming trip to [place] in . Patient discussed her mother-in-law passing away and recently attending the .        Treatment Objective(s) Addressed in This Session:   Increase interest, engagement, and pleasure in doing things  Decrease frequency and intensity of feeling anxious, nervous, worried       Intervention:   Motivational Interviewing    MI Intervention: Expressed Empathy/Understanding, Supported Autonomy, Collaboration, Evocation, Open-ended questions, and Reflections: simple and complex     Change Talk Expressed by the Patient: Desire to change    Provider Response to Change Talk: E - Evoked more info from patient about behavior change, A - Affirmed patient's thoughts, decisions, or attempts at behavior change, and R - Reflected patient's change talk    Assessments completed prior to visit:  The following assessments were completed by patient for this visit:  PHQ9:       2024     9:31 AM 2024     1:56 PM 12/10/2024     5:43 AM 2024     2:44 PM 2025     1:35 PM 2025     7:36 AM 2025     7:11 AM   PHQ-9 SCORE   PHQ-9 Total Score MyChart 3 (Minimal depression) 8 (Mild depression) 7 (Mild depression) 7 (Mild depression) 4 (Minimal depression) 9 (Mild depression) 11 (Moderate depression)   PHQ-9 Total Score 3  8  7  7  4  9  11        Patient-reported     GAD7:       5/10/2022     7:54 AM 2022     1:11 PM 2023     3:25 PM 2024     8:15 AM 2024    10:49 AM 2024     9:31 AM 2025     1:35 PM   TRINITY-7 SCORE   Total Score  4 (minimal anxiety) 5 (mild anxiety)  8 (mild anxiety) 4 (minimal anxiety) 7 (mild anxiety)   Total Score 3 4 5 4 8 4  7        Patient-reported     PROMIS 10-Global Health (all questions and answers  displayed):       6/10/2024    11:56 AM 7/18/2024     8:01 AM 10/21/2024     7:34 PM 11/5/2024    12:49 PM 11/19/2024     1:58 PM 12/10/2024     5:45 AM 2/27/2025     8:03 AM   PROMIS 10   In general, would you say your health is:  Good Fair Good Fair     In general, would you say your quality of life is:  Fair Fair Good Good     In general, how would you rate your physical health?  Good Fair Fair Good     In general, how would you rate your mental health, including your mood and your ability to think?  Poor Fair Fair Fair     In general, how would you rate your satisfaction with your social activities and relationships?  Good Poor Poor Fair     In general, please rate how well you carry out your usual social activities and roles  Fair Poor Good Good     To what extent are you able to carry out your everyday physical activities such as walking, climbing stairs, carrying groceries, or moving a chair?  Completely Completely Completely Completely     In the past 7 days, how often have you been bothered by emotional problems such as feeling anxious, depressed, or irritable?  Sometimes Sometimes Sometimes Often     In the past 7 days, how would you rate your fatigue on average?  Mild Mild Mild Moderate     In the past 7 days, how would you rate your pain on average, where 0 means no pain, and 10 means worst imaginable pain?  0 0 0 0     In general, would you say your health is:  3 2 3 2  2   In general, would you say your quality of life is:  2 2 3 3  3   In general, how would you rate your physical health?  3 2 2 3  3   In general, how would you rate your mental health, including your mood and your ability to think?  1 2 2 2  2   In general, how would you rate your satisfaction with your social activities and relationships?  3 1 1 2  2   In general, please rate how well you carry out your usual social activities and roles. (This includes activities at home, at work and in your community, and responsibilities as a  "parent, child, spouse, employee, friend, etc.)  2 1 3 3  3   To what extent are you able to carry out your everyday physical activities such as walking, climbing stairs, carrying groceries, or moving a chair?  5 5 5 5  5   In the past 7 days, how often have you been bothered by emotional problems such as feeling anxious, depressed, or irritable?  3 3 3 4  4   In the past 7 days, how would you rate your fatigue on average?  2 2 2 3  3   In the past 7 days, how would you rate your pain on average, where 0 means no pain, and 10 means worst imaginable pain?  0 0 0 0  0   Global Mental Health Score  9 8 9  9   9   Global Physical Health Score  17 16 16  16   16   PROMIS TOTAL - SUBSCORES  26 24 25  25   25       Information is confidential and restricted. Go to Review Flowsheets to unlock data.    Patient-reported     Alpha Suicide Severity Rating Scale (Lifetime/Recent)      2/3/2019     3:50 AM 1/20/2021     7:53 PM 2/20/2024    10:00 AM 7/25/2024    11:54 AM   Alpha Suicide Severity Rating (Lifetime/Recent)   Q1 Wished to be Dead (Past Month) no  no      Q2 Suicidal Thoughts (Past Month) no  no      Q3 Suicidal Thought Method  no      Q4 Suicidal Intent without Specific Plan  no      Q5 Suicide Intent with Specific Plan  no      Q6 Suicide Behavior (Lifetime) no  no      1. Wish to be Dead (Lifetime)   Y N   Wish to be Dead Description (Lifetime)   The patient denied having any history of suicide attempts.    1. Wish to be Dead (Past 1 Month)   Y    Wish to be Dead Description (Past 1 Month)   The patient reported she had ONLY had passive suicide ideation, with no plan or intent to harm herself.  The thoughts had been along the lines of \"thongs would be easier if she were not around\".    2. Non-Specific Active Suicidal Thoughts (Lifetime)   Y N   Non-Specific Active Suicidal Thought Description (Lifetime)   See above    2. Non-Specific Active Suicidal Thoughts (Past 1 Month)   Y    Non-Specific Active Suicidal " Thought Description (Past 1 Month)   See above    3. Active Suicidal Ideation with any Methods (Not Plan) Without Intent to Act (Lifetime)   N    Active Suicidal Ideation with any Methods (Not Plan) Description (Lifetime)   NA    3. Active Suicidal Ideation with any Methods (Not Plan) Without Intent to Act (Past 1 Month)   N    4. Active Suicidal Ideation with Some Intent to Act, Without Specific Plan (Lifetime)   N    Active Suicidal Ideation with Some Intent to Act, Without Specific Plan Description (Lifetime)   NA    4. Active Suicidal Ideation with Some Intent to Act, Without Specific Plan (Past 1 Month)   N    5. Active Suicidal Ideation with Specific Plan and Intent (Lifetime)   N    Active Suicidal Ideation with Specific Plan and Intent Description (Lifetime)   NA    5. Active Suicidal Ideation with Specific Plan and Intent (Past 1 Month)   N    Most Severe Ideation Rating (Lifetime)   3    Description of Most Severe Ideation (Lifetime)   See abobe    Most Severe Ideation Rating (Past 1 Month)   2    Description of Most Severe Ideation (Past 1 Month)   See above    Frequency (Lifetime)   1    Frequency (Past 1 Month)   1    Duration (Lifetime)   3    Duration (Past 1 Month)   3    Controllability (Lifetime)   3    Controllability (Past 1 Month)   3    Deterrents (Lifetime)   1    Deterrents (Past 1 Month)   1    Reasons for Ideation (Lifetime)   5    Reasons for Ideation (Past 1 Month)   5    Actual Attempt (Lifetime)   N    Has subject engaged in non-suicidal self-injurious behavior? (Lifetime)   N    Interrupted Attempts (Lifetime)   N    Aborted or Self-Interrupted Attempt (Lifetime)   N    Preparatory Acts or Behavior (Lifetime)   N    Calculated C-SSRS Risk Score (Lifetime/Recent)   Low Risk        Data saved with a previous flowsheet row definition         ASSESSMENT: Current Emotional / Mental Status (status of significant symptoms):   Risk status (Self / Other harm or suicidal ideation)   Patient  denies current fears or concerns for personal safety.   Patient denies current or recent suicidal ideation or behaviors.    Patient denies current or recent homicidal ideation or behaviors.   Patient denies current or recent self injurious behavior or ideation.   Patient denies other safety concerns.   Patient reports there has been no change in risk factors since their last session.     Patient reports there has been no change in protective factors since their last session.     Recommended that patient call 911 or go to the local ED should there be a change in any of these risk factors     Appearance:   Appropriate    Eye Contact:   Good    Psychomotor Behavior: Normal    Attitude:   Cooperative  Pleasant   Orientation:   All   Speech    Rate / Production: Normal/ Responsive    Volume:  Normal    Mood:    Anxious    Affect:    Appropriate    Thought Content:  Clear    Thought Form:  Coherent  Logical    Insight:    Fair      Medication Review:   No changes to current psychiatric medication(s)     Medication Compliance:   Yes     Changes in Health Issues:   None reported     Chemical Use Review:   Substance Use: Chemical use reviewed, no active concerns identified .  However, patient's daily cannabis use will continue to be evaluated for Cannabis Use Disorder. Patient reported successfully completing chemical dependency treatment at [facility] and has reportedly been sober from alcohol use since February 2024.      Tobacco Use: No current tobacco use.      Diagnosis:  1. Generalized anxiety disorder    2. Recurrent major depressive disorder, in partial remission    Other Diagnoses that is relevant to services: Alcohol Use Disorder, currently in partial remission   Provisional Diagnoses in the process of being ruled-out: ADHD & PTSD      Collateral Reports Completed:   Not Applicable    PLAN: (Patient Tasks / Therapist Tasks / Other)  Patient will return in 3 weeks for next session. Patient will engage in  self-care activities. Patient will work on mindfulness skills to increase awareness of triggers to anxiety and depression. Patient will work on open and direct communication skills with her . Patient will work on positive and negative reinforcement of her 's behaviors. Patient will work on setting clear boundaries and expectations with her spouse. Patient will consider increasing to 3 consistent meals throughout the day. Patient will continue abstaining from alcohol use. Patient will work on focusing on things within her control.    Melanie Lei, [facility]                                                         ______________________________________________________________________    Individual Treatment Plan    Patient's Name: Lanie Martin  YOB: 1990    Date of Creation: 8/1/24  Date Treatment Plan Last Reviewed/Revised: 5/21/25    DSM5 Diagnoses:   Encounter Diagnoses   Name Primary?    Generalized anxiety disorder Yes    Recurrent major depressive disorder, in partial remission        Psychosocial / Contextual Factors: Substance use, relationship stressors, occupational dynamics.  PROMIS (reviewed every 90 days):   PROMIS-10 from encounters over the past 365 days     2/27/25 11/19/24 11/5/24 10/22/24 7/25/24   Global Mental Health Score 9 (P)  9 (P)  9 (P) 8 (P) 9   Global Physical Health Score 16 (P)  16 (P)  16 (P) 16 (P) 17   PROMIS TOTAL - SUBSCORES 25 (P)  25 (P)  25 (P) 24 (P) 26       Referral / Collaboration:  Referral to another professional/service is not indicated at this time..    Anticipated number of session for this episode of care: 9-12 sessions  Anticipation frequency of session: Biweekly  Anticipated Duration of each session: 38-52 minutes  Treatment plan will be reviewed in 90 days or when goals have been changed.       MeasurableTreatment Goal(s) related to diagnosis / functional impairment(s)  Goal 1: Patient will reduce depression symptoms as evidenced by a  reduction in their PHQ9 score to a 2 or less in the next 2 months.     I will know I've met my goal when I'm smiling more.      Objective #A (Patient Action)    Patient will Decrease frequency and intensity of feeling down, depressed, hopeless.  Status: Continued - Date(s): 5/21/25    Intervention(s)  Therapist will teach distress tolerance and coping skills.    Objective #B  Patient will Increase interest, engagement, and pleasure in doing things.  Status: Continued - Date(s): 5/21/25    Intervention(s)  Therapist will teach self-care skills.    Objective #C  Patient will Improve diet, appetite, mindful eating, and / or meal planning.  Status: Continued - Date(s): 5/21/25    Intervention(s)  Therapist will provide psycho-education on the impact and effects of nutrition on mental health. .        Goal 2: Patient will reduce anxiety symptoms as evidenced by a reduction in their GAD7 score to a 4 or less in the next 2 months.     I will know I've met my goal when I don't panic when someone walks in for their shift & I'm not stressing out when my  sighs.      Objective #A (Patient Action)    Status: Continued - Date(s): 5/21/25    Patient will identify at least 2 triggers for anxiety.    Intervention(s)  Therapist will teach mindfulness skills.    Objective #B  Patient will use thought-stopping strategy daily to reduce intrusive thoughts.    Status: Continued - Date(s): 5/21/25    Intervention(s)  Therapist will teach thought-stopping and thought-challenging techniques.      Patient has reviewed and agreed to the above plan.    Melanie Lei [facility] Hospital[facility]ANLUCA on 5/21/2025 at 9:05 AM

## 2025-06-11 ENCOUNTER — VIRTUAL VISIT (OUTPATIENT)
Dept: BEHAVIORAL HEALTH | Facility: CLINIC | Age: 35
End: 2025-06-11
Payer: COMMERCIAL

## 2025-06-11 DIAGNOSIS — F41.1 GENERALIZED ANXIETY DISORDER: Primary | ICD-10-CM

## 2025-06-11 DIAGNOSIS — F33.41 RECURRENT MAJOR DEPRESSIVE DISORDER, IN PARTIAL REMISSION: ICD-10-CM

## 2025-06-11 PROCEDURE — 90834 PSYTX W PT 45 MINUTES: CPT | Mod: 95

## 2025-06-11 ASSESSMENT — ANXIETY QUESTIONNAIRES
7. FEELING AFRAID AS IF SOMETHING AWFUL MIGHT HAPPEN: NOT AT ALL
IF YOU CHECKED OFF ANY PROBLEMS ON THIS QUESTIONNAIRE, HOW DIFFICULT HAVE THESE PROBLEMS MADE IT FOR YOU TO DO YOUR WORK, TAKE CARE OF THINGS AT HOME, OR GET ALONG WITH OTHER PEOPLE: SOMEWHAT DIFFICULT
GAD7 TOTAL SCORE: 9
8. IF YOU CHECKED OFF ANY PROBLEMS, HOW DIFFICULT HAVE THESE MADE IT FOR YOU TO DO YOUR WORK, TAKE CARE OF THINGS AT HOME, OR GET ALONG WITH OTHER PEOPLE?: SOMEWHAT DIFFICULT
GAD7 TOTAL SCORE: 9
6. BECOMING EASILY ANNOYED OR IRRITABLE: MORE THAN HALF THE DAYS
3. WORRYING TOO MUCH ABOUT DIFFERENT THINGS: MORE THAN HALF THE DAYS
2. NOT BEING ABLE TO STOP OR CONTROL WORRYING: NOT AT ALL
5. BEING SO RESTLESS THAT IT IS HARD TO SIT STILL: SEVERAL DAYS
GAD7 TOTAL SCORE: 9
7. FEELING AFRAID AS IF SOMETHING AWFUL MIGHT HAPPEN: NOT AT ALL
4. TROUBLE RELAXING: MORE THAN HALF THE DAYS
1. FEELING NERVOUS, ANXIOUS, OR ON EDGE: MORE THAN HALF THE DAYS

## 2025-06-11 ASSESSMENT — PATIENT HEALTH QUESTIONNAIRE - PHQ9
10. IF YOU CHECKED OFF ANY PROBLEMS, HOW DIFFICULT HAVE THESE PROBLEMS MADE IT FOR YOU TO DO YOUR WORK, TAKE CARE OF THINGS AT HOME, OR GET ALONG WITH OTHER PEOPLE: SOMEWHAT DIFFICULT
SUM OF ALL RESPONSES TO PHQ QUESTIONS 1-9: 5
SUM OF ALL RESPONSES TO PHQ QUESTIONS 1-9: 5

## 2025-06-11 NOTE — PROGRESS NOTES
[facility]                                     Progress Note    Patient Name: Lanie Martin  Date: 25           Service Type: Individual      Session Start Time:  9.00  Session End Time:      Session Length: 38-52 minutes     Session #: 16    Attendees: Client attended alone    Service Modality:  Video Visit:      Provider verified identity through the following two step process.  Patient provided:  Patient  and Patient address    Telemedicine Visit: The patient's condition can be safely assessed and treated via synchronous audio and visual telemedicine encounter.      Reason for Telemedicine Visit: Patient has requested telehealth visit    Originating Site (Patient Location): Patient's place of employment    Distant Site (Provider Location): [facility]    Consent:  The patient/guardian has verbally consented to: the potential risks and benefits of telemedicine (video visit) versus in person care; bill my insurance or make self-payment for services provided; and responsibility for payment of non-covered services.     Patient would like the video invitation sent by:  My Chart    Mode of Communication:  Video Conference via Amwell    Distant Location (Provider):  Off-site    As the provider I attest to compliance with applicable laws and regulations related to telemedicine.    DATA  Interactive Complexity: No  Crisis: No         Progress Since Last Session (Related to Symptoms / Goals / Homework):   Symptoms: Mixed. Patient's PHQ9 score has decreased and her GAD7 score has increased.    Homework: Partially completed      Episode of Care Goals: Satisfactory progress - ACTION (Actively working towards change); Intervened by reinforcing change plan / affirming steps taken     Current / Ongoing Stressors and Concerns:  Patient discussed workplace dynamics and learning new skills. Patient discussed grief and loss from her grandmother  recently passing away. Patient discussed attending the . Patient discussed her children finishing up the school year. Patient discussed behavioral stressors with her children and their struggles with attention and hyperactivity. Patient discussed financial stressors. Patient discussed an upcoming trip to [place] in .        Treatment Objective(s) Addressed in This Session:   Increase interest, engagement, and pleasure in doing things  Decrease frequency and intensity of feeling anxious, nervous, worried       Intervention:   Motivational Interviewing    MI Intervention: Expressed Empathy/Understanding, Supported Autonomy, Collaboration, Evocation, Open-ended questions, and Reflections: simple and complex     Change Talk Expressed by the Patient: Desire to change    Provider Response to Change Talk: E - Evoked more info from patient about behavior change, A - Affirmed patient's thoughts, decisions, or attempts at behavior change, and R - Reflected patient's change talk    Assessments completed prior to visit:  The following assessments were completed by patient for this visit:  PHQ9:       2024     1:56 PM 12/10/2024     5:43 AM 2024     2:44 PM 2025     1:35 PM 2025     7:36 AM 2025     7:11 AM 2025     8:08 AM   PHQ-9 SCORE   PHQ-9 Total Score MyChart 8 (Mild depression) 7 (Mild depression) 7 (Mild depression) 4 (Minimal depression) 9 (Mild depression) 11 (Moderate depression) 5 (Mild depression)   PHQ-9 Total Score 8  7  7  4  9  11  5        Patient-reported     GAD7:       2022     1:11 PM 2023     3:25 PM 2024     8:15 AM 2024    10:49 AM 2024     9:31 AM 2025     1:35 PM 2025     8:08 AM   TRINITY-7 SCORE   Total Score 4 (minimal anxiety) 5 (mild anxiety)  8 (mild anxiety) 4 (minimal anxiety) 7 (mild anxiety) 9 (mild anxiety)   Total Score 4 5 4 8 4  7  9        Patient-reported     PROMIS 10-Global Health (all questions and  answers displayed):       7/18/2024     8:01 AM 10/21/2024     7:34 PM 11/5/2024    12:49 PM 11/19/2024     1:58 PM 12/10/2024     5:45 AM 2/27/2025     8:03 AM 6/11/2025     8:09 AM   PROMIS 10   In general, would you say your health is: Good Fair Good Fair   Good   In general, would you say your quality of life is: Fair Fair Good Good   Good   In general, how would you rate your physical health? Good Fair Fair Good   Good   In general, how would you rate your mental health, including your mood and your ability to think? Poor Fair Fair Fair   Fair   In general, how would you rate your satisfaction with your social activities and relationships? Good Poor Poor Fair   Good   In general, please rate how well you carry out your usual social activities and roles Fair Poor Good Good   Good   To what extent are you able to carry out your everyday physical activities such as walking, climbing stairs, carrying groceries, or moving a chair? Completely Completely Completely Completely   Completely   In the past 7 days, how often have you been bothered by emotional problems such as feeling anxious, depressed, or irritable? Sometimes Sometimes Sometimes Often   Sometimes   In the past 7 days, how would you rate your fatigue on average? Mild Mild Mild Moderate   Mild   In the past 7 days, how would you rate your pain on average, where 0 means no pain, and 10 means worst imaginable pain? 0 0 0 0   0   In general, would you say your health is: 3 2 3 2  2 3   In general, would you say your quality of life is: 2 2 3 3  3 3   In general, how would you rate your physical health? 3 2 2 3  3 3   In general, how would you rate your mental health, including your mood and your ability to think? 1 2 2 2  2 2   In general, how would you rate your satisfaction with your social activities and relationships? 3 1 1 2  2 3   In general, please rate how well you carry out your usual social activities and roles. (This includes activities at home, at  "work and in your community, and responsibilities as a parent, child, spouse, employee, friend, etc.) 2 1 3 3  3 3   To what extent are you able to carry out your everyday physical activities such as walking, climbing stairs, carrying groceries, or moving a chair? 5 5 5 5  5 5   In the past 7 days, how often have you been bothered by emotional problems such as feeling anxious, depressed, or irritable? 3 3 3 4  4 3   In the past 7 days, how would you rate your fatigue on average? 2 2 2 3  3 2   In the past 7 days, how would you rate your pain on average, where 0 means no pain, and 10 means worst imaginable pain? 0 0 0 0  0 0   Global Mental Health Score 9 8 9  9   9 11    Global Physical Health Score 17 16 16  16   16 17    PROMIS TOTAL - SUBSCORES 26 24 25  25   25 28        Information is confidential and restricted. Go to Review Flowsheets to unlock data.    Patient-reported     Parker Suicide Severity Rating Scale (Lifetime/Recent)      2/3/2019     3:50 AM 1/20/2021     7:53 PM 2/20/2024    10:00 AM 7/25/2024    11:54 AM   Parker Suicide Severity Rating (Lifetime/Recent)   Q1 Wished to be Dead (Past Month) no  no      Q2 Suicidal Thoughts (Past Month) no  no      Q3 Suicidal Thought Method  no      Q4 Suicidal Intent without Specific Plan  no      Q5 Suicide Intent with Specific Plan  no      Q6 Suicide Behavior (Lifetime) no  no      1. Wish to be Dead (Lifetime)   Y N   Wish to be Dead Description (Lifetime)   The patient denied having any history of suicide attempts.    1. Wish to be Dead (Past 1 Month)   Y    Wish to be Dead Description (Past 1 Month)   The patient reported she had ONLY had passive suicide ideation, with no plan or intent to harm herself.  The thoughts had been along the lines of \"thongs would be easier if she were not around\".    2. Non-Specific Active Suicidal Thoughts (Lifetime)   Y N   Non-Specific Active Suicidal Thought Description (Lifetime)   See above    2. Non-Specific Active " Suicidal Thoughts (Past 1 Month)   Y    Non-Specific Active Suicidal Thought Description (Past 1 Month)   See above    3. Active Suicidal Ideation with any Methods (Not Plan) Without Intent to Act (Lifetime)   N    Active Suicidal Ideation with any Methods (Not Plan) Description (Lifetime)   NA    3. Active Suicidal Ideation with any Methods (Not Plan) Without Intent to Act (Past 1 Month)   N    4. Active Suicidal Ideation with Some Intent to Act, Without Specific Plan (Lifetime)   N    Active Suicidal Ideation with Some Intent to Act, Without Specific Plan Description (Lifetime)   NA    4. Active Suicidal Ideation with Some Intent to Act, Without Specific Plan (Past 1 Month)   N    5. Active Suicidal Ideation with Specific Plan and Intent (Lifetime)   N    Active Suicidal Ideation with Specific Plan and Intent Description (Lifetime)   NA    5. Active Suicidal Ideation with Specific Plan and Intent (Past 1 Month)   N    Most Severe Ideation Rating (Lifetime)   3    Description of Most Severe Ideation (Lifetime)   See abobe    Most Severe Ideation Rating (Past 1 Month)   2    Description of Most Severe Ideation (Past 1 Month)   See above    Frequency (Lifetime)   1    Frequency (Past 1 Month)   1    Duration (Lifetime)   3    Duration (Past 1 Month)   3    Controllability (Lifetime)   3    Controllability (Past 1 Month)   3    Deterrents (Lifetime)   1    Deterrents (Past 1 Month)   1    Reasons for Ideation (Lifetime)   5    Reasons for Ideation (Past 1 Month)   5    Actual Attempt (Lifetime)   N    Has subject engaged in non-suicidal self-injurious behavior? (Lifetime)   N    Interrupted Attempts (Lifetime)   N    Aborted or Self-Interrupted Attempt (Lifetime)   N    Preparatory Acts or Behavior (Lifetime)   N    Calculated C-SSRS Risk Score (Lifetime/Recent)   Low Risk        Data saved with a previous flowsheet row definition         ASSESSMENT: Current Emotional / Mental Status (status of significant  symptoms):   Risk status (Self / Other harm or suicidal ideation)   Patient denies current fears or concerns for personal safety.   Patient denies current or recent suicidal ideation or behaviors.    Patient denies current or recent homicidal ideation or behaviors.   Patient denies current or recent self injurious behavior or ideation.   Patient denies other safety concerns.   Patient reports there has been no change in risk factors since their last session.     Patient reports there has been no change in protective factors since their last session.     Recommended that patient call 911 or go to the local ED should there be a change in any of these risk factors     Appearance:   Appropriate    Eye Contact:   Good    Psychomotor Behavior: Normal    Attitude:   Cooperative  Pleasant   Orientation:   All   Speech    Rate / Production: Normal/ Responsive    Volume:  Normal    Mood:    Anxious    Affect:    Appropriate    Thought Content:  Clear    Thought Form:  Coherent  Logical    Insight:    Fair      Medication Review:   No changes to current psychiatric medication(s)     Medication Compliance:   Yes     Changes in Health Issues:   None reported     Chemical Use Review:   Substance Use: Chemical use reviewed, no active concerns identified .  However, patient's daily cannabis use will continue to be evaluated for Cannabis Use Disorder. Patient reported successfully completing chemical dependency treatment at [facility] and has reportedly been sober from alcohol use since February 2024.      Tobacco Use: No current tobacco use.      Diagnosis:  1. Generalized anxiety disorder    2. Recurrent major depressive disorder, in partial remission    Other Diagnoses that is relevant to services: Alcohol Use Disorder, currently in partial remission   Provisional Diagnoses in the process of being ruled-out: ADHD & PTSD      Collateral Reports Completed:   Not Applicable    PLAN: (Patient Tasks / Therapist Tasks /  Other)  Patient will return in 3 weeks for next session. Patient will engage in self-care activities. Patient will work on mindfulness skills to increase awareness of triggers to anxiety and depression. Patient will work on open and direct communication skills with her . Patient will work on positive and negative reinforcement of her 's behaviors. Patient will work on setting clear boundaries and expectations with her spouse. Patient will consider increasing to 3 consistent meals throughout the day. Patient will continue abstaining from alcohol use. Patient will work on focusing on things within her control.    Melanie Lei, [facility]                                                         ______________________________________________________________________    Individual Treatment Plan    Patient's Name: Lanie Martin  YOB: 1990    Date of Creation: 8/1/24  Date Treatment Plan Last Reviewed/Revised: 5/21/25    DSM5 Diagnoses:   Encounter Diagnoses   Name Primary?    Generalized anxiety disorder Yes    Recurrent major depressive disorder, in partial remission        Psychosocial / Contextual Factors: Substance use, relationship stressors, occupational dynamics.  PROMIS (reviewed every 90 days):   PROMIS-10 from encounters over the past 365 days     6/11/25 2/27/25 11/19/24 11/5/24 10/22/24 7/25/24   Global Mental Health Score 11 9 (P)  9 (P)  9 (P) 8 (P) 9   Global Physical Health Score 17 16 (P)  16 (P)  16 (P) 16 (P) 17   PROMIS TOTAL - SUBSCORES 28 25 (P)  25 (P)  25 (P) 24        Referral / Collaboration:  Referral to another professional/service is not indicated at this time..    Anticipated number of session for this episode of care: 9-12 sessions  Anticipation frequency of session: Biweekly  Anticipated Duration of each session: 38-52 minutes  Treatment plan will be reviewed in 90 days or when goals have been changed.       MeasurableTreatment Goal(s) related to diagnosis /  functional impairment(s)  Goal 1: Patient will reduce depression symptoms as evidenced by a reduction in their PHQ9 score to a 2 or less in the next 2 months.     I will know I've met my goal when I'm smiling more.      Objective #A (Patient Action)    Patient will Decrease frequency and intensity of feeling down, depressed, hopeless.  Status: Continued - Date(s): 5/21/25    Intervention(s)  Therapist will teach distress tolerance and coping skills.    Objective #B  Patient will Increase interest, engagement, and pleasure in doing things.  Status: Continued - Date(s): 5/21/25    Intervention(s)  Therapist will teach self-care skills.    Objective #C  Patient will Improve diet, appetite, mindful eating, and / or meal planning.  Status: Continued - Date(s): 5/21/25    Intervention(s)  Therapist will provide psycho-education on the impact and effects of nutrition on mental health. .        Goal 2: Patient will reduce anxiety symptoms as evidenced by a reduction in their GAD7 score to a 4 or less in the next 2 months.     I will know I've met my goal when I don't panic when someone walks in for their shift & I'm not stressing out when my  sighs.      Objective #A (Patient Action)    Status: Continued - Date(s): 5/21/25    Patient will identify at least 2 triggers for anxiety.    Intervention(s)  Therapist will teach mindfulness skills.    Objective #B  Patient will use thought-stopping strategy daily to reduce intrusive thoughts.    Status: Continued - Date(s): 5/21/25    Intervention(s)  Therapist will teach thought-stopping and thought-challenging techniques.      Patient has reviewed and agreed to the above plan.    REGINE Lopez on 5/21/2025 at 9:05 AM

## 2025-06-30 ENCOUNTER — VIRTUAL VISIT (OUTPATIENT)
Dept: BEHAVIORAL HEALTH | Facility: CLINIC | Age: 35
End: 2025-06-30
Payer: COMMERCIAL

## 2025-06-30 DIAGNOSIS — F33.41 RECURRENT MAJOR DEPRESSIVE DISORDER, IN PARTIAL REMISSION: ICD-10-CM

## 2025-06-30 DIAGNOSIS — F41.1 GENERALIZED ANXIETY DISORDER: Primary | ICD-10-CM

## 2025-06-30 PROCEDURE — 90834 PSYTX W PT 45 MINUTES: CPT | Mod: 95

## 2025-06-30 NOTE — PROGRESS NOTES
[facility]                                     Progress Note    Patient Name: Lanie Martin  Date: 25           Service Type: Individual      Session Start Time:  10.00  Session End Time: 10.40     Session Length: 38-52 minutes     Session #: 17    Attendees: Client attended alone    Service Modality:  Video Visit:      Provider verified identity through the following two step process.  Patient provided:  Patient  and Patient address    Telemedicine Visit: The patient's condition can be safely assessed and treated via synchronous audio and visual telemedicine encounter.      Reason for Telemedicine Visit: Patient has requested telehealth visit    Originating Site (Patient Location): Patient's place of employment    Distant Site (Provider Location): [facility]    Consent:  The patient/guardian has verbally consented to: the potential risks and benefits of telemedicine (video visit) versus in person care; bill my insurance or make self-payment for services provided; and responsibility for payment of non-covered services.     Patient would like the video invitation sent by:  My Chart    Mode of Communication:  Video Conference via [employer]    Distant Location (Provider):  Off-site    As the provider I attest to compliance with applicable laws and regulations related to telemedicine.    DATA  Interactive Complexity: No  Crisis: No         Progress Since Last Session (Related to Symptoms / Goals / Homework):   Symptoms: Mixed. Patient's PHQ9 score has decreased and her GAD7 score has increased.    Homework: Partially completed      Episode of Care Goals: Satisfactory progress - ACTION (Actively working towards change); Intervened by reinforcing change plan / affirming steps taken     Current / Ongoing Stressors and Concerns:  Patient discussed fidgeting coping skills. Patient discussed her chickens. Patient discussed her children's  struggles with hyperactivity. Patient discussed a recent trip to [place] with her family. Patient discussed a lack of 4th of July plans. Patient discussed intimacy dynamics with her . Patient discussed love languages. Patient discussed under-stimulating workplace dynamics. Patient discussed her caffeine consumption.        Treatment Objective(s) Addressed in This Session:   Increase interest, engagement, and pleasure in doing things  Decrease frequency and intensity of feeling anxious, nervous, worried       Intervention:   Motivational Interviewing    MI Intervention: Expressed Empathy/Understanding, Supported Autonomy, Collaboration, Evocation, Open-ended questions, and Reflections: simple and complex     Change Talk Expressed by the Patient: Desire to change    Provider Response to Change Talk: E - Evoked more info from patient about behavior change, A - Affirmed patient's thoughts, decisions, or attempts at behavior change, and R - Reflected patient's change talk    Assessments completed prior to visit:  The following assessments were completed by patient for this visit:  PHQ9:       11/19/2024     1:56 PM 12/10/2024     5:43 AM 12/31/2024     2:44 PM 1/13/2025     1:35 PM 2/27/2025     7:36 AM 4/22/2025     7:11 AM 6/11/2025     8:08 AM   PHQ-9 SCORE   PHQ-9 Total Score MyChart 8 (Mild depression) 7 (Mild depression) 7 (Mild depression) 4 (Minimal depression) 9 (Mild depression) 11 (Moderate depression) 5 (Mild depression)   PHQ-9 Total Score 8  7  7  4  9  11  5        Patient-reported     GAD7:       8/28/2022     1:11 PM 12/20/2023     3:25 PM 1/24/2024     8:15 AM 7/22/2024    10:49 AM 11/4/2024     9:31 AM 1/13/2025     1:35 PM 6/11/2025     8:08 AM   TRINITY-7 SCORE   Total Score 4 (minimal anxiety) 5 (mild anxiety)  8 (mild anxiety) 4 (minimal anxiety) 7 (mild anxiety) 9 (mild anxiety)   Total Score 4 5 4 8 4  7  9        Patient-reported     PROMIS 10-Global Health (all questions and  answers displayed):       10/21/2024     7:34 PM 11/5/2024    12:49 PM 11/19/2024     1:58 PM 12/10/2024     5:45 AM 2/27/2025     8:03 AM 6/11/2025     8:09 AM 6/11/2025     9:02 AM   PROMIS 10   In general, would you say your health is: Fair Good Fair   Good    In general, would you say your quality of life is: Fair Good Good   Good    In general, how would you rate your physical health? Fair Fair Good   Good    In general, how would you rate your mental health, including your mood and your ability to think? Fair Fair Fair   Fair    In general, how would you rate your satisfaction with your social activities and relationships? Poor Poor Fair   Good    In general, please rate how well you carry out your usual social activities and roles Poor Good Good   Good    To what extent are you able to carry out your everyday physical activities such as walking, climbing stairs, carrying groceries, or moving a chair? Completely Completely Completely   Completely    In the past 7 days, how often have you been bothered by emotional problems such as feeling anxious, depressed, or irritable? Sometimes Sometimes Often   Sometimes    In the past 7 days, how would you rate your fatigue on average? Mild Mild Moderate   Mild    In the past 7 days, how would you rate your pain on average, where 0 means no pain, and 10 means worst imaginable pain? 0 0 0   0    In general, would you say your health is: 2 3 2  2 3 3   In general, would you say your quality of life is: 2 3 3  3 3 3   In general, how would you rate your physical health? 2 2 3  3 3 3   In general, how would you rate your mental health, including your mood and your ability to think? 2 2 2  2 2 2   In general, how would you rate your satisfaction with your social activities and relationships? 1 1 2  2 3 3   In general, please rate how well you carry out your usual social activities and roles. (This includes activities at home, at work and in your community, and  "responsibilities as a parent, child, spouse, employee, friend, etc.) 1 3 3  3 3 3   To what extent are you able to carry out your everyday physical activities such as walking, climbing stairs, carrying groceries, or moving a chair? 5 5 5  5 5 5   In the past 7 days, how often have you been bothered by emotional problems such as feeling anxious, depressed, or irritable? 3 3 4  4 3 3   In the past 7 days, how would you rate your fatigue on average? 2 2 3  3 2 2   In the past 7 days, how would you rate your pain on average, where 0 means no pain, and 10 means worst imaginable pain? 0 0 0  0 0 0   Global Mental Health Score 8 9  9   9 11  11   Global Physical Health Score 16 16  16   16 17  17   PROMIS TOTAL - SUBSCORES 24 25  25   25 28  28       Information is confidential and restricted. Go to Review Flowsheets to unlock data.    Patient-reported     [place] Suicide Severity Rating Scale (Lifetime/Recent)      2/3/2019     3:50 AM 1/20/2021     7:53 PM 2/20/2024    10:00 AM 7/25/2024    11:54 AM   [place] Suicide Severity Rating (Lifetime/Recent)   Q1 Wished to be Dead (Past Month) no  no      Q2 Suicidal Thoughts (Past Month) no  no      Q3 Suicidal Thought Method  no      Q4 Suicidal Intent without Specific Plan  no      Q5 Suicide Intent with Specific Plan  no      Q6 Suicide Behavior (Lifetime) no  no      1. Wish to be Dead (Lifetime)   Y N   Wish to be Dead Description (Lifetime)   The patient denied having any history of suicide attempts.    1. Wish to be Dead (Past 1 Month)   Y    Wish to be Dead Description (Past 1 Month)   The patient reported she had ONLY had passive suicide ideation, with no plan or intent to harm herself.  The thoughts had been along the lines of \"thongs would be easier if she were not around\".    2. Non-Specific Active Suicidal Thoughts (Lifetime)   Y N   Non-Specific Active Suicidal Thought Description (Lifetime)   See above    2. Non-Specific Active Suicidal Thoughts (Past 1 Month)  "  Y    Non-Specific Active Suicidal Thought Description (Past 1 Month)   See above    3. Active Suicidal Ideation with any Methods (Not Plan) Without Intent to Act (Lifetime)   N    Active Suicidal Ideation with any Methods (Not Plan) Description (Lifetime)   NA    3. Active Suicidal Ideation with any Methods (Not Plan) Without Intent to Act (Past 1 Month)   N    4. Active Suicidal Ideation with Some Intent to Act, Without Specific Plan (Lifetime)   N    Active Suicidal Ideation with Some Intent to Act, Without Specific Plan Description (Lifetime)   NA    4. Active Suicidal Ideation with Some Intent to Act, Without Specific Plan (Past 1 Month)   N    5. Active Suicidal Ideation with Specific Plan and Intent (Lifetime)   N    Active Suicidal Ideation with Specific Plan and Intent Description (Lifetime)   NA    5. Active Suicidal Ideation with Specific Plan and Intent (Past 1 Month)   N    Most Severe Ideation Rating (Lifetime)   3    Description of Most Severe Ideation (Lifetime)   See abobe    Most Severe Ideation Rating (Past 1 Month)   2    Description of Most Severe Ideation (Past 1 Month)   See above    Frequency (Lifetime)   1    Frequency (Past 1 Month)   1    Duration (Lifetime)   3    Duration (Past 1 Month)   3    Controllability (Lifetime)   3    Controllability (Past 1 Month)   3    Deterrents (Lifetime)   1    Deterrents (Past 1 Month)   1    Reasons for Ideation (Lifetime)   5    Reasons for Ideation (Past 1 Month)   5    Actual Attempt (Lifetime)   N    Has subject engaged in non-suicidal self-injurious behavior? (Lifetime)   N    Interrupted Attempts (Lifetime)   N    Aborted or Self-Interrupted Attempt (Lifetime)   N    Preparatory Acts or Behavior (Lifetime)   N    Calculated C-SSRS Risk Score (Lifetime/Recent)   Low Risk        Data saved with a previous flowsheet row definition         ASSESSMENT: Current Emotional / Mental Status (status of significant symptoms):   Risk status (Self / Other harm  or suicidal ideation)   Patient denies current fears or concerns for personal safety.   Patient denies current or recent suicidal ideation or behaviors.    Patient denies current or recent homicidal ideation or behaviors.   Patient denies current or recent self injurious behavior or ideation.   Patient denies other safety concerns.   Patient reports there has been no change in risk factors since their last session.     Patient reports there has been no change in protective factors since their last session.     Recommended that patient call 911 or go to the local ED should there be a change in any of these risk factors     Appearance:   Appropriate    Eye Contact:   Good    Psychomotor Behavior: Normal    Attitude:   Cooperative  Pleasant   Orientation:   All   Speech    Rate / Production: Normal/ Responsive    Volume:  Normal    Mood:    Anxious    Affect:    Appropriate    Thought Content:  Clear    Thought Form:  Coherent  Logical    Insight:    Fair      Medication Review:   No changes to current psychiatric medication(s)     Medication Compliance:   Yes     Changes in Health Issues:   None reported     Chemical Use Review:   Substance Use: Chemical use reviewed, no active concerns identified .  However, patient's daily cannabis use will continue to be evaluated for Cannabis Use Disorder. Patient reported successfully completing chemical dependency treatment at [facility] and has reportedly been sober from alcohol use since February 2024.      Tobacco Use: No current tobacco use.      Diagnosis:  1. Generalized anxiety disorder    2. Recurrent major depressive disorder, in partial remission    Other Diagnoses that is relevant to services: Alcohol Use Disorder, currently in partial remission   Provisional Diagnoses in the process of being ruled-out: ADHD & PTSD      Collateral Reports Completed:   Not Applicable    PLAN: (Patient Tasks / Therapist Tasks / Other)  Patient will return in 3 weeks for next  session. Patient will engage in self-care activities. Patient will work on mindfulness skills to increase awareness of triggers to anxiety and depression. Patient will work on open and direct communication skills with her . Patient will work on positive and negative reinforcement of her 's behaviors. Patient will work on setting clear boundaries and expectations with her spouse. Patient will consider increasing to 3 consistent meals throughout the day. Patient will continue abstaining from alcohol use. Patient will work on focusing on things within her control.    Melanie Lei, [facility]                                                         ______________________________________________________________________    Individual Treatment Plan    Patient's Name: Lanie Martin  YOB: 1990    Date of Creation: 8/1/24  Date Treatment Plan Last Reviewed/Revised: 5/21/25    DSM5 Diagnoses:   Encounter Diagnoses   Name Primary?    Generalized anxiety disorder Yes    Recurrent major depressive disorder, in partial remission        Psychosocial / Contextual Factors: Substance use, relationship stressors, occupational dynamics.  PROMIS (reviewed every 90 days):   PROMIS-10 from encounters over the past 365 days     6/11/25 2/27/25 11/19/24 11/5/24 10/22/24 7/25/24   Global Mental Health Score 11 9 (P)  9 (P)  9 (P) 8 (P) 9   Global Physical Health Score 17 16 (P)  16 (P)  16 (P) 16 (P) 17   PROMIS TOTAL - SUBSCORES 28 25 (P)  25 (P)  25 (P) 24        Referral / Collaboration:  Referral to another professional/service is not indicated at this time..    Anticipated number of session for this episode of care: 9-12 sessions  Anticipation frequency of session: Biweekly  Anticipated Duration of each session: 38-52 minutes  Treatment plan will be reviewed in 90 days or when goals have been changed.       MeasurableTreatment Goal(s) related to diagnosis / functional impairment(s)  Goal 1: Patient will  reduce depression symptoms as evidenced by a reduction in their PHQ9 score to a 2 or less in the next 2 months.     I will know I've met my goal when I'm smiling more.      Objective #A (Patient Action)    Patient will Decrease frequency and intensity of feeling down, depressed, hopeless.  Status: Continued - Date(s): 5/21/25    Intervention(s)  Therapist will teach distress tolerance and coping skills.    Objective #B  Patient will Increase interest, engagement, and pleasure in doing things.  Status: Continued - Date(s): 5/21/25    Intervention(s)  Therapist will teach self-care skills.    Objective #C  Patient will Improve diet, appetite, mindful eating, and / or meal planning.  Status: Continued - Date(s): 5/21/25    Intervention(s)  Therapist will provide psycho-education on the impact and effects of nutrition on mental health. .        Goal 2: Patient will reduce anxiety symptoms as evidenced by a reduction in their GAD7 score to a 4 or less in the next 2 months.     I will know I've met my goal when I don't panic when someone walks in for their shift & I'm not stressing out when my  sighs.      Objective #A (Patient Action)    Status: Continued - Date(s): 5/21/25    Patient will identify at least 2 triggers for anxiety.    Intervention(s)  Therapist will teach mindfulness skills.    Objective #B  Patient will use thought-stopping strategy daily to reduce intrusive thoughts.    Status: Continued - Date(s): 5/21/25    Intervention(s)  Therapist will teach thought-stopping and thought-challenging techniques.      Patient has reviewed and agreed to the above plan.    REGINE Lopez on 5/21/2025 at 9:05 AM

## 2025-07-27 ASSESSMENT — ANXIETY QUESTIONNAIRES
1. FEELING NERVOUS, ANXIOUS, OR ON EDGE: SEVERAL DAYS
6. BECOMING EASILY ANNOYED OR IRRITABLE: MORE THAN HALF THE DAYS
8. IF YOU CHECKED OFF ANY PROBLEMS, HOW DIFFICULT HAVE THESE MADE IT FOR YOU TO DO YOUR WORK, TAKE CARE OF THINGS AT HOME, OR GET ALONG WITH OTHER PEOPLE?: SOMEWHAT DIFFICULT
7. FEELING AFRAID AS IF SOMETHING AWFUL MIGHT HAPPEN: NOT AT ALL
5. BEING SO RESTLESS THAT IT IS HARD TO SIT STILL: SEVERAL DAYS
IF YOU CHECKED OFF ANY PROBLEMS ON THIS QUESTIONNAIRE, HOW DIFFICULT HAVE THESE PROBLEMS MADE IT FOR YOU TO DO YOUR WORK, TAKE CARE OF THINGS AT HOME, OR GET ALONG WITH OTHER PEOPLE: SOMEWHAT DIFFICULT
GAD7 TOTAL SCORE: 6
3. WORRYING TOO MUCH ABOUT DIFFERENT THINGS: SEVERAL DAYS
4. TROUBLE RELAXING: SEVERAL DAYS
2. NOT BEING ABLE TO STOP OR CONTROL WORRYING: NOT AT ALL
GAD7 TOTAL SCORE: 6
7. FEELING AFRAID AS IF SOMETHING AWFUL MIGHT HAPPEN: NOT AT ALL
GAD7 TOTAL SCORE: 6

## 2025-07-28 ENCOUNTER — VIRTUAL VISIT (OUTPATIENT)
Dept: BEHAVIORAL HEALTH | Facility: CLINIC | Age: 35
End: 2025-07-28
Payer: COMMERCIAL

## 2025-07-28 DIAGNOSIS — F33.41 RECURRENT MAJOR DEPRESSIVE DISORDER, IN PARTIAL REMISSION: ICD-10-CM

## 2025-07-28 DIAGNOSIS — F41.1 GENERALIZED ANXIETY DISORDER: ICD-10-CM

## 2025-07-28 PROCEDURE — 90834 PSYTX W PT 45 MINUTES: CPT | Mod: 95

## 2025-07-28 NOTE — PROGRESS NOTES
[facility]                                     Progress Note    Patient Name: Lanie Martin  Date: 25           Service Type: Individual      Session Start Time:  8.00  Session End Time: 8.41     Session Length: 41 minutes     Session #: 18    Attendees: Client attended alone    Service Modality:  Video Visit:      Provider verified identity through the following two step process.  Patient provided:  Patient  and Patient address    Telemedicine Visit: The patient's condition can be safely assessed and treated via synchronous audio and visual telemedicine encounter.      Reason for Telemedicine Visit: Patient has requested telehealth visit    Originating Site (Patient Location): Patient's home    Distant Site (Provider Location): [facility]    Consent:  The patient/guardian has verbally consented to: the potential risks and benefits of telemedicine (video visit) versus in person care; bill my insurance or make self-payment for services provided; and responsibility for payment of non-covered services.     Patient would like the video invitation sent by:  My Chart    Mode of Communication:  Video Conference via [employer]well    Distant Location (Provider):  Off-site    As the provider I attest to compliance with applicable laws and regulations related to telemedicine.    DATA  Interactive Complexity: No  Crisis: No         Progress Since Last Session (Related to Symptoms / Goals / Homework):   Symptoms: Improving . Patient's GAD7 score has decreased and her PHQ9 score has remained relatively stable.    Homework: Partially completed      Episode of Care Goals: Satisfactory progress - ACTION (Actively working towards change); Intervened by reinforcing change plan / affirming steps taken     Current / Ongoing Stressors and Concerns:  Patient discussed renewing her conceal and carry permit. Patient discussed not feeling like she fits into the  workplace culture at her new job. Patient discussed interpersonal dynamics with her coworkers. Patient discussed feeling under-stimulated at work. Patient discussed setting boundaries regarding intimacy with her . Patient discussed the potential challenges if she were to separate from her . Patient discussed relationship dynamics with her 18 year old son.        Treatment Objective(s) Addressed in This Session:   Increase interest, engagement, and pleasure in doing things  Decrease frequency and intensity of feeling anxious, nervous, worried       Intervention:   Motivational Interviewing    MI Intervention: Expressed Empathy/Understanding, Supported Autonomy, Collaboration, Evocation, Open-ended questions, and Reflections: simple and complex     Change Talk Expressed by the Patient: Desire to change    Provider Response to Change Talk: E - Evoked more info from patient about behavior change, A - Affirmed patient's thoughts, decisions, or attempts at behavior change, and R - Reflected patient's change talk    Assessments completed prior to visit:  The following assessments were completed by patient for this visit:  PHQ9:       12/10/2024     5:43 AM 12/31/2024     2:44 PM 1/13/2025     1:35 PM 2/27/2025     7:36 AM 4/22/2025     7:11 AM 6/11/2025     8:08 AM 7/27/2025     9:21 AM   PHQ-9 SCORE   PHQ-9 Total Score MyChart 7 (Mild depression) 7 (Mild depression) 4 (Minimal depression) 9 (Mild depression) 11 (Moderate depression) 5 (Mild depression) 5 (Mild depression)   PHQ-9 Total Score 7  7  4  9  11  5  5        Patient-reported     GAD7:       12/20/2023     3:25 PM 1/24/2024     8:15 AM 7/22/2024    10:49 AM 11/4/2024     9:31 AM 1/13/2025     1:35 PM 6/11/2025     8:08 AM 7/27/2025     9:22 AM   TRINITY-7 SCORE   Total Score 5 (mild anxiety)  8 (mild anxiety) 4 (minimal anxiety) 7 (mild anxiety) 9 (mild anxiety) 6 (mild anxiety)   Total Score 5 4 8 4  7  9  6        Patient-reported     PROMIS  10-Global Health (all questions and answers displayed):       10/21/2024     7:34 PM 11/5/2024    12:49 PM 11/19/2024     1:58 PM 12/10/2024     5:45 AM 2/27/2025     8:03 AM 6/11/2025     8:09 AM 6/11/2025     9:02 AM   PROMIS 10   In general, would you say your health is: Fair Good Fair   Good    In general, would you say your quality of life is: Fair Good Good   Good    In general, how would you rate your physical health? Fair Fair Good   Good    In general, how would you rate your mental health, including your mood and your ability to think? Fair Fair Fair   Fair    In general, how would you rate your satisfaction with your social activities and relationships? Poor Poor Fair   Good    In general, please rate how well you carry out your usual social activities and roles Poor Good Good   Good    To what extent are you able to carry out your everyday physical activities such as walking, climbing stairs, carrying groceries, or moving a chair? Completely Completely Completely   Completely    In the past 7 days, how often have you been bothered by emotional problems such as feeling anxious, depressed, or irritable? Sometimes Sometimes Often   Sometimes    In the past 7 days, how would you rate your fatigue on average? Mild Mild Moderate   Mild    In the past 7 days, how would you rate your pain on average, where 0 means no pain, and 10 means worst imaginable pain? 0 0 0   0    In general, would you say your health is: 2 3 2  2 3 3   In general, would you say your quality of life is: 2 3 3  3 3 3   In general, how would you rate your physical health? 2 2 3  3 3 3   In general, how would you rate your mental health, including your mood and your ability to think? 2 2 2  2 2 2   In general, how would you rate your satisfaction with your social activities and relationships? 1 1 2  2 3 3   In general, please rate how well you carry out your usual social activities and roles. (This includes activities at home, at work and in  "your community, and responsibilities as a parent, child, spouse, employee, friend, etc.) 1 3 3  3 3 3   To what extent are you able to carry out your everyday physical activities such as walking, climbing stairs, carrying groceries, or moving a chair? 5 5 5  5 5 5   In the past 7 days, how often have you been bothered by emotional problems such as feeling anxious, depressed, or irritable? 3 3 4  4 3 3   In the past 7 days, how would you rate your fatigue on average? 2 2 3  3 2 2   In the past 7 days, how would you rate your pain on average, where 0 means no pain, and 10 means worst imaginable pain? 0 0 0  0 0 0   Global Mental Health Score 8 9  9   9 11  11   Global Physical Health Score 16 16  16   16 17  17   PROMIS TOTAL - SUBSCORES 24 25  25   25 28  28       Information is confidential and restricted. Go to Review Flowsheets to unlock data.    Patient-reported     [place] Suicide Severity Rating Scale (Lifetime/Recent)      2/3/2019     3:50 AM 1/20/2021     7:53 PM 2/20/2024    10:00 AM 7/25/2024    11:54 AM   [place] Suicide Severity Rating (Lifetime/Recent)   Q1 Wished to be Dead (Past Month) no  no      Q2 Suicidal Thoughts (Past Month) no  no      Q3 Suicidal Thought Method  no      Q4 Suicidal Intent without Specific Plan  no      Q5 Suicide Intent with Specific Plan  no      Q6 Suicide Behavior (Lifetime) no  no      1. Wish to be Dead (Lifetime)   Y N   Wish to be Dead Description (Lifetime)   The patient denied having any history of suicide attempts.    1. Wish to be Dead (Past 1 Month)   Y    Wish to be Dead Description (Past 1 Month)   The patient reported she had ONLY had passive suicide ideation, with no plan or intent to harm herself.  The thoughts had been along the lines of \"thongs would be easier if she were not around\".    2. Non-Specific Active Suicidal Thoughts (Lifetime)   Y N   Non-Specific Active Suicidal Thought Description (Lifetime)   See above    2. Non-Specific Active Suicidal " Thoughts (Past 1 Month)   Y    Non-Specific Active Suicidal Thought Description (Past 1 Month)   See above    3. Active Suicidal Ideation with any Methods (Not Plan) Without Intent to Act (Lifetime)   N    Active Suicidal Ideation with any Methods (Not Plan) Description (Lifetime)   NA    3. Active Suicidal Ideation with any Methods (Not Plan) Without Intent to Act (Past 1 Month)   N    4. Active Suicidal Ideation with Some Intent to Act, Without Specific Plan (Lifetime)   N    Active Suicidal Ideation with Some Intent to Act, Without Specific Plan Description (Lifetime)   NA    4. Active Suicidal Ideation with Some Intent to Act, Without Specific Plan (Past 1 Month)   N    5. Active Suicidal Ideation with Specific Plan and Intent (Lifetime)   N    Active Suicidal Ideation with Specific Plan and Intent Description (Lifetime)   NA    5. Active Suicidal Ideation with Specific Plan and Intent (Past 1 Month)   N    Most Severe Ideation Rating (Lifetime)   3    Description of Most Severe Ideation (Lifetime)   See abobe    Most Severe Ideation Rating (Past 1 Month)   2    Description of Most Severe Ideation (Past 1 Month)   See above    Frequency (Lifetime)   1    Frequency (Past 1 Month)   1    Duration (Lifetime)   3    Duration (Past 1 Month)   3    Controllability (Lifetime)   3    Controllability (Past 1 Month)   3    Deterrents (Lifetime)   1    Deterrents (Past 1 Month)   1    Reasons for Ideation (Lifetime)   5    Reasons for Ideation (Past 1 Month)   5    Actual Attempt (Lifetime)   N    Has subject engaged in non-suicidal self-injurious behavior? (Lifetime)   N    Interrupted Attempts (Lifetime)   N    Aborted or Self-Interrupted Attempt (Lifetime)   N    Preparatory Acts or Behavior (Lifetime)   N    Calculated C-SSRS Risk Score (Lifetime/Recent)   Low Risk        Data saved with a previous flowsheet row definition         ASSESSMENT: Current Emotional / Mental Status (status of significant symptoms):   Risk  status (Self / Other harm or suicidal ideation)   Patient denies current fears or concerns for personal safety.   Patient denies current or recent suicidal ideation or behaviors.    Patient denies current or recent homicidal ideation or behaviors.   Patient denies current or recent self injurious behavior or ideation.   Patient denies other safety concerns.   Patient reports there has been no change in risk factors since their last session.     Patient reports there has been no change in protective factors since their last session.     Recommended that patient call 911 or go to the local ED should there be a change in any of these risk factors     Appearance:   Appropriate    Eye Contact:   Good    Psychomotor Behavior: Normal    Attitude:   Cooperative  Pleasant   Orientation:   All   Speech    Rate / Production: Normal/ Responsive Talkative    Volume:  Normal    Mood:    Anxious    Affect:    Appropriate    Thought Content:  Clear    Thought Form:  Coherent  Logical    Insight:    Fair      Medication Review:   No changes to current psychiatric medication(s)     Medication Compliance:   Yes     Changes in Health Issues:   None reported     Chemical Use Review:   Substance Use: Chemical use reviewed, no active concerns identified .  However, patient's daily cannabis use will continue to be evaluated for Cannabis Use Disorder. Patient reported successfully completing chemical dependency treatment at [facility] and has reportedly been sober from alcohol use since February 2024.      Tobacco Use: No current tobacco use.      Diagnosis:  1. Generalized anxiety disorder    2. Recurrent major depressive disorder, in partial remission    Other Diagnoses that is relevant to services: Alcohol Use Disorder, currently in partial remission   Provisional Diagnoses in the process of being ruled-out: ADHD & PTSD      Collateral Reports Completed:   Not Applicable    PLAN: (Patient Tasks / Therapist Tasks /  Other)  Patient will return in 3 weeks for next session. Patient will engage in self-care activities. Patient will work on mindfulness skills to increase awareness of triggers to anxiety and depression. Patient will work on open and direct communication skills with her . Patient will work on positive and negative reinforcement of her 's behaviors. Patient will work on setting clear boundaries and expectations with her spouse. Patient will consider increasing to 3 consistent meals throughout the day. Patient will continue abstaining from alcohol use. Patient will work on focusing on things within her control.    There has been demonstrated improvement in functioning while patient has been engaged in psychotherapy/psychological services- if withdrawn the patient would likely deteriorate and/or relapse.       Melanie Lei, [facility]                                                         ______________________________________________________________________    Individual Treatment Plan    Patient's Name: Lanie Martin  YOB: 1990    Date of Creation: 8/1/24  Date Treatment Plan Last Reviewed/Revised: 5/21/25    DSM5 Diagnoses:   Encounter Diagnoses   Name Primary?    Generalized anxiety disorder     Recurrent major depressive disorder, in partial remission        Psychosocial / Contextual Factors: Substance use, relationship stressors, occupational dynamics.  PROMIS (reviewed every 90 days):   PROMIS-10 from encounters over the past 365 days     6/11/25 2/27/25 11/19/24 11/5/24 10/22/24 7/25/24   Global Mental Health Score 11 9 (P)  9 (P)  9 (P) 8 (P) 9   Global Physical Health Score 17 16 (P)  16 (P)  16 (P) 16 (P) 17   PROMIS TOTAL - SUBSCORES 28 25 (P)  25 (P)  25 (P) 24        Referral / Collaboration:  Referral to another professional/service is not indicated at this time..    Anticipated number of session for this episode of care: 9-12 sessions  Anticipation frequency of session:  Biweekly  Anticipated Duration of each session: 38-52 minutes  Treatment plan will be reviewed in 90 days or when goals have been changed.       MeasurableTreatment Goal(s) related to diagnosis / functional impairment(s)  Goal 1: Patient will reduce depression symptoms as evidenced by a reduction in their PHQ9 score to a 2 or less in the next 2 months.     I will know I've met my goal when I'm smiling more.      Objective #A (Patient Action)    Patient will Decrease frequency and intensity of feeling down, depressed, hopeless.  Status: Continued - Date(s): 5/21/25    Intervention(s)  Therapist will teach distress tolerance and coping skills.    Objective #B  Patient will Increase interest, engagement, and pleasure in doing things.  Status: Continued - Date(s): 5/21/25    Intervention(s)  Therapist will teach self-care skills.    Objective #C  Patient will Improve diet, appetite, mindful eating, and / or meal planning.  Status: Continued - Date(s): 5/21/25    Intervention(s)  Therapist will provide psycho-education on the impact and effects of nutrition on mental health. .        Goal 2: Patient will reduce anxiety symptoms as evidenced by a reduction in their GAD7 score to a 4 or less in the next 2 months.     I will know I've met my goal when I don't panic when someone walks in for their shift & I'm not stressing out when my  sighs.      Objective #A (Patient Action)    Status: Continued - Date(s): 5/21/25    Patient will identify at least 2 triggers for anxiety.    Intervention(s)  Therapist will teach mindfulness skills.    Objective #B  Patient will use thought-stopping strategy daily to reduce intrusive thoughts.    Status: Continued - Date(s): 5/21/25    Intervention(s)  Therapist will teach thought-stopping and thought-challenging techniques.      Patient has reviewed and agreed to the above plan.    REGINE Lopez on 5/21/2025 at 9:05 AM

## 2025-08-27 ENCOUNTER — PREP FOR PROCEDURE (OUTPATIENT)
Dept: OBGYN | Facility: CLINIC | Age: 35
End: 2025-08-27

## 2025-08-27 ENCOUNTER — TELEPHONE (OUTPATIENT)
Dept: OBGYN | Facility: CLINIC | Age: 35
End: 2025-08-27

## 2025-08-27 ENCOUNTER — OFFICE VISIT (OUTPATIENT)
Dept: OBGYN | Facility: CLINIC | Age: 35
End: 2025-08-27
Payer: COMMERCIAL

## 2025-08-27 VITALS
RESPIRATION RATE: 18 BRPM | HEART RATE: 78 BPM | WEIGHT: 132 LBS | DIASTOLIC BLOOD PRESSURE: 73 MMHG | SYSTOLIC BLOOD PRESSURE: 109 MMHG | BODY MASS INDEX: 23.39 KG/M2 | HEIGHT: 63 IN

## 2025-08-27 DIAGNOSIS — Z12.4 CERVICAL CANCER SCREENING: ICD-10-CM

## 2025-08-27 DIAGNOSIS — Z01.818 PREOP GENERAL PHYSICAL EXAM: Primary | ICD-10-CM

## 2025-08-27 DIAGNOSIS — N93.9 ABNORMAL UTERINE BLEEDING (AUB): ICD-10-CM

## 2025-08-27 DIAGNOSIS — N93.9 ABNORMAL UTERINE BLEEDING (AUB): Primary | ICD-10-CM

## 2025-08-27 LAB
HPV HR 12 DNA CVX QL NAA+PROBE: NEGATIVE
HPV16 DNA CVX QL NAA+PROBE: NEGATIVE
HPV18 DNA CVX QL NAA+PROBE: NEGATIVE
HUMAN PAPILLOMA VIRUS FINAL DIAGNOSIS: NORMAL

## 2025-08-27 PROCEDURE — 3078F DIAST BP <80 MM HG: CPT | Performed by: STUDENT IN AN ORGANIZED HEALTH CARE EDUCATION/TRAINING PROGRAM

## 2025-08-27 PROCEDURE — 99459 PELVIC EXAMINATION: CPT | Performed by: STUDENT IN AN ORGANIZED HEALTH CARE EDUCATION/TRAINING PROGRAM

## 2025-08-27 PROCEDURE — 87624 HPV HI-RISK TYP POOLED RSLT: CPT | Performed by: STUDENT IN AN ORGANIZED HEALTH CARE EDUCATION/TRAINING PROGRAM

## 2025-08-27 PROCEDURE — 3074F SYST BP LT 130 MM HG: CPT | Performed by: STUDENT IN AN ORGANIZED HEALTH CARE EDUCATION/TRAINING PROGRAM

## 2025-08-27 PROCEDURE — 99203 OFFICE O/P NEW LOW 30 MIN: CPT | Performed by: STUDENT IN AN ORGANIZED HEALTH CARE EDUCATION/TRAINING PROGRAM

## 2025-08-30 ENCOUNTER — HOSPITAL ENCOUNTER (OUTPATIENT)
Dept: ULTRASOUND IMAGING | Facility: CLINIC | Age: 35
Discharge: HOME | End: 2025-08-30
Attending: STUDENT IN AN ORGANIZED HEALTH CARE EDUCATION/TRAINING PROGRAM | Admitting: STUDENT IN AN ORGANIZED HEALTH CARE EDUCATION/TRAINING PROGRAM
Payer: COMMERCIAL

## 2025-08-30 DIAGNOSIS — N93.9 ABNORMAL UTERINE BLEEDING (AUB): ICD-10-CM

## 2025-08-30 PROCEDURE — 76856 US EXAM PELVIC COMPLETE: CPT

## (undated) DEVICE — ESU LIGASURE OPEN SEALER/DIVIDER SM JAW 16.5MM LF1212A

## (undated) DEVICE — SPONGE LAP 18X18" X8435

## (undated) DEVICE — SU VICRYL 4-0 FS-2 27" J422-H

## (undated) DEVICE — GLOVE PROTEXIS W/NEU-THERA 7.0  2D73TE70

## (undated) DEVICE — ADH SKIN CLOSURE PREMIERPRO EXOFIN 1.0ML 3470

## (undated) DEVICE — GOWN LG DISP 9515

## (undated) DEVICE — PACK LAPAROTOMY CUSTOM LAKES

## (undated) DEVICE — SU VICRYL 0 UR-6 27" J603H

## (undated) RX ORDER — DEXAMETHASONE SODIUM PHOSPHATE 10 MG/ML
INJECTION, SOLUTION INTRAMUSCULAR; INTRAVENOUS
Status: DISPENSED
Start: 2021-01-22

## (undated) RX ORDER — KETOROLAC TROMETHAMINE 30 MG/ML
INJECTION, SOLUTION INTRAMUSCULAR; INTRAVENOUS
Status: DISPENSED
Start: 2021-01-22

## (undated) RX ORDER — BUPIVACAINE HYDROCHLORIDE 2.5 MG/ML
INJECTION, SOLUTION INFILTRATION; PERINEURAL
Status: DISPENSED
Start: 2021-01-22

## (undated) RX ORDER — LIDOCAINE HYDROCHLORIDE 20 MG/ML
JELLY TOPICAL
Status: DISPENSED
Start: 2021-01-22

## (undated) RX ORDER — ONDANSETRON 2 MG/ML
INJECTION INTRAMUSCULAR; INTRAVENOUS
Status: DISPENSED
Start: 2021-01-22

## (undated) RX ORDER — FENTANYL CITRATE 50 UG/ML
INJECTION, SOLUTION INTRAMUSCULAR; INTRAVENOUS
Status: DISPENSED
Start: 2021-01-22